# Patient Record
Sex: MALE | Race: WHITE | Employment: UNEMPLOYED | URBAN - METROPOLITAN AREA
[De-identification: names, ages, dates, MRNs, and addresses within clinical notes are randomized per-mention and may not be internally consistent; named-entity substitution may affect disease eponyms.]

---

## 2017-03-31 ENCOUNTER — LAB REQUISITION (OUTPATIENT)
Dept: LAB | Facility: HOSPITAL | Age: 52
End: 2017-03-31

## 2017-03-31 DIAGNOSIS — Z00.00 ENCOUNTER FOR GENERAL ADULT MEDICAL EXAMINATION WITHOUT ABNORMAL FINDINGS: ICD-10-CM

## 2017-03-31 PROCEDURE — 84480 ASSAY TRIIODOTHYRONINE (T3): CPT | Performed by: GENERAL PRACTICE

## 2017-03-31 PROCEDURE — 80053 COMPREHEN METABOLIC PANEL: CPT | Performed by: GENERAL PRACTICE

## 2017-03-31 PROCEDURE — 84436 ASSAY OF TOTAL THYROXINE: CPT | Performed by: GENERAL PRACTICE

## 2017-03-31 PROCEDURE — 85025 COMPLETE CBC W/AUTO DIFF WBC: CPT | Performed by: GENERAL PRACTICE

## 2017-03-31 PROCEDURE — 84443 ASSAY THYROID STIM HORMONE: CPT | Performed by: GENERAL PRACTICE

## 2017-03-31 PROCEDURE — 80061 LIPID PANEL: CPT | Performed by: GENERAL PRACTICE

## 2018-05-14 ENCOUNTER — LAB SERVICES (OUTPATIENT)
Dept: OTHER | Age: 53
End: 2018-05-14

## 2018-05-14 ENCOUNTER — CHARTING TRANS (OUTPATIENT)
Dept: OTHER | Age: 53
End: 2018-05-14

## 2018-05-14 LAB — RAPID STREP GROUP A: NORMAL

## 2018-05-17 ENCOUNTER — CHARTING TRANS (OUTPATIENT)
Dept: OTHER | Age: 53
End: 2018-05-17

## 2018-05-17 ASSESSMENT — PAIN SCALES - GENERAL: PAINLEVEL_OUTOF10: 0

## 2018-11-01 VITALS
OXYGEN SATURATION: 97 % | TEMPERATURE: 99.6 F | HEART RATE: 105 BPM | HEIGHT: 64 IN | BODY MASS INDEX: 30.39 KG/M2 | SYSTOLIC BLOOD PRESSURE: 148 MMHG | DIASTOLIC BLOOD PRESSURE: 62 MMHG | WEIGHT: 178 LBS | RESPIRATION RATE: 18 BRPM

## 2018-11-01 VITALS
OXYGEN SATURATION: 96 % | HEART RATE: 90 BPM | RESPIRATION RATE: 16 BRPM | BODY MASS INDEX: 32.27 KG/M2 | HEIGHT: 63 IN | TEMPERATURE: 98.5 F | WEIGHT: 182.1 LBS

## 2019-01-09 ENCOUNTER — OFFICE VISIT (OUTPATIENT)
Dept: INTERNAL MEDICINE CLINIC | Facility: CLINIC | Age: 54
End: 2019-01-09
Payer: MEDICAID

## 2019-01-09 VITALS
TEMPERATURE: 98 F | HEART RATE: 80 BPM | DIASTOLIC BLOOD PRESSURE: 82 MMHG | SYSTOLIC BLOOD PRESSURE: 142 MMHG | WEIGHT: 181 LBS | OXYGEN SATURATION: 98 % | RESPIRATION RATE: 16 BRPM | HEIGHT: 63 IN | BODY MASS INDEX: 32.07 KG/M2

## 2019-01-09 DIAGNOSIS — F17.200 TOBACCO DEPENDENCE: ICD-10-CM

## 2019-01-09 DIAGNOSIS — E78.2 MIXED HYPERLIPIDEMIA: ICD-10-CM

## 2019-01-09 DIAGNOSIS — I10 ESSENTIAL HYPERTENSION: ICD-10-CM

## 2019-01-09 DIAGNOSIS — Z00.00 WELLNESS EXAMINATION: Primary | ICD-10-CM

## 2019-01-09 DIAGNOSIS — Z00.00 LABORATORY EXAM ORDERED AS PART OF ROUTINE GENERAL MEDICAL EXAMINATION: ICD-10-CM

## 2019-01-09 DIAGNOSIS — Z12.39 SCREENING FOR BREAST CANCER: ICD-10-CM

## 2019-01-09 PROCEDURE — 99386 PREV VISIT NEW AGE 40-64: CPT | Performed by: INTERNAL MEDICINE

## 2019-01-09 RX ORDER — LOSARTAN POTASSIUM 25 MG/1
25 TABLET ORAL DAILY
Qty: 30 TABLET | Refills: 1 | Status: SHIPPED | OUTPATIENT
Start: 2019-01-09 | End: 2019-01-31

## 2019-01-09 NOTE — PATIENT INSTRUCTIONS
Blood work    Follow up with Dr. Fields [facility] gynecology     Call to schedule mammogram     Start losartan once daily in the morning. Monitor blood pressure at home and check 2 hours after taking medication.

## 2019-01-09 NOTE — PROGRESS NOTES
[facility] Internal Medicine Office Note  Chief Complaint:   Patient presents with:  New Patient  CPX      HPI:   This is a 52year old adult male (biologically female) coming in for establishing care and physical   HPI    He takes supplements fo Past Medical History, Past Surgical History, Family History and   Social History updated shows  Social History    Tobacco Use      Smoking status: Current Every Day Smoker      Smokeless tobacco: Never Used      Tobacco comment: Smokes 2-3 cigarettes daily Psychiatric: He has a normal mood and affect.        ASSESSMENT AND PLAN:   Jerica Fajardo is a 52year old adult with  Wellness examination  (primary encounter diagnosis)  Mixed hyperlipidemia  Tobacco dependence  Laboratory exam ordered as part of r 10/03/1965  Annual Physical due on 10/03/1965  Annual Depression Screen due on 10/03/1975  Pap Smear,3 Years due on 10/03/1994  FIT Colorectal Screening due on 10/03/2013  PSA due on 10/03/2013  Influenza Vaccine(1) due on 09/01/2018  Patient/Caregiver Edu

## 2019-01-15 ENCOUNTER — LAB ENCOUNTER (OUTPATIENT)
Dept: LAB | Age: 54
End: 2019-01-15
Attending: INTERNAL MEDICINE
Payer: MEDICAID

## 2019-01-15 DIAGNOSIS — Z00.00 LABORATORY EXAM ORDERED AS PART OF ROUTINE GENERAL MEDICAL EXAMINATION: ICD-10-CM

## 2019-01-15 LAB
ALBUMIN SERPL-MCNC: 4.1 G/DL (ref 3.1–4.5)
ALBUMIN/GLOB SERPL: 1.2 {RATIO} (ref 1–2)
ALP LIVER SERPL-CCNC: 62 U/L (ref 45–117)
ALT SERPL-CCNC: 25 U/L (ref 17–63)
ANION GAP SERPL CALC-SCNC: 5 MMOL/L (ref 0–18)
AST SERPL-CCNC: 18 U/L (ref 15–41)
BASOPHILS # BLD AUTO: 0.06 X10(3) UL (ref 0–0.1)
BASOPHILS NFR BLD AUTO: 0.6 %
BILIRUB SERPL-MCNC: 0.6 MG/DL (ref 0.1–2)
BUN BLD-MCNC: 16 MG/DL (ref 8–20)
BUN/CREAT SERPL: 15.2 (ref 10–20)
CALCIUM BLD-MCNC: 9.2 MG/DL (ref 8.3–10.3)
CHLORIDE SERPL-SCNC: 106 MMOL/L (ref 101–111)
CHOLEST SMN-MCNC: 209 MG/DL (ref ?–200)
CO2 SERPL-SCNC: 28 MMOL/L (ref 22–32)
CREAT BLD-MCNC: 1.05 MG/DL (ref 0.7–1.3)
EOSINOPHIL # BLD AUTO: 0.93 X10(3) UL (ref 0–0.3)
EOSINOPHIL NFR BLD AUTO: 8.7 %
ERYTHROCYTE [DISTWIDTH] IN BLOOD BY AUTOMATED COUNT: 12.9 % (ref 11.5–16)
GLOBULIN PLAS-MCNC: 3.4 G/DL (ref 2.8–4.4)
GLUCOSE BLD-MCNC: 98 MG/DL (ref 70–99)
HCT VFR BLD AUTO: 51 % (ref 37–53)
HDLC SERPL-MCNC: 32 MG/DL (ref 40–59)
HGB BLD-MCNC: 17.4 G/DL (ref 13–17)
IMMATURE GRANULOCYTE COUNT: 0.03 X10(3) UL (ref 0–1)
IMMATURE GRANULOCYTE RATIO %: 0.3 %
LDLC SERPL CALC-MCNC: 128 MG/DL (ref ?–100)
LYMPHOCYTES # BLD AUTO: 3.19 X10(3) UL (ref 0.9–4)
LYMPHOCYTES NFR BLD AUTO: 29.7 %
M PROTEIN MFR SERPL ELPH: 7.5 G/DL (ref 6.4–8.2)
MCH RBC QN AUTO: 30.6 PG (ref 27–33.2)
MCHC RBC AUTO-ENTMCNC: 34.1 G/DL (ref 31–37)
MCV RBC AUTO: 89.6 FL (ref 80–99)
MONOCYTES # BLD AUTO: 0.96 X10(3) UL (ref 0.1–1)
MONOCYTES NFR BLD AUTO: 8.9 %
NEUTROPHIL ABS PRELIM: 5.58 X10 (3) UL (ref 1.3–6.7)
NEUTROPHILS # BLD AUTO: 5.58 X10(3) UL (ref 1.3–6.7)
NEUTROPHILS NFR BLD AUTO: 51.8 %
NONHDLC SERPL-MCNC: 177 MG/DL (ref ?–130)
OSMOLALITY SERPL CALC.SUM OF ELEC: 289 MOSM/KG (ref 275–295)
PLATELET # BLD AUTO: 218 10(3)UL (ref 150–450)
POTASSIUM SERPL-SCNC: 4.2 MMOL/L (ref 3.6–5.1)
RBC # BLD AUTO: 5.69 X10(6)UL (ref 4.3–5.7)
RED CELL DISTRIBUTION WIDTH-SD: 42.2 FL (ref 35.1–46.3)
SODIUM SERPL-SCNC: 139 MMOL/L (ref 136–144)
TRIGL SERPL-MCNC: 243 MG/DL (ref 30–149)
TSI SER-ACNC: 3.76 MIU/ML (ref 0.35–5.5)
VLDLC SERPL CALC-MCNC: 49 MG/DL (ref 0–30)
WBC # BLD AUTO: 10.8 X10(3) UL (ref 4–13)

## 2019-01-15 PROCEDURE — 80061 LIPID PANEL: CPT

## 2019-01-15 PROCEDURE — 80053 COMPREHEN METABOLIC PANEL: CPT

## 2019-01-15 PROCEDURE — 36415 COLL VENOUS BLD VENIPUNCTURE: CPT

## 2019-01-15 PROCEDURE — 85025 COMPLETE CBC W/AUTO DIFF WBC: CPT

## 2019-01-15 PROCEDURE — 84443 ASSAY THYROID STIM HORMONE: CPT

## 2019-01-31 ENCOUNTER — OFFICE VISIT (OUTPATIENT)
Dept: INTERNAL MEDICINE CLINIC | Facility: CLINIC | Age: 54
End: 2019-01-31
Payer: MEDICAID

## 2019-01-31 VITALS
DIASTOLIC BLOOD PRESSURE: 68 MMHG | BODY MASS INDEX: 32.25 KG/M2 | RESPIRATION RATE: 14 BRPM | SYSTOLIC BLOOD PRESSURE: 142 MMHG | TEMPERATURE: 99 F | HEIGHT: 63 IN | OXYGEN SATURATION: 98 % | WEIGHT: 182 LBS | HEART RATE: 89 BPM

## 2019-01-31 DIAGNOSIS — Z51.81 ENCOUNTER FOR MONITORING TESTOSTERONE REPLACEMENT THERAPY: ICD-10-CM

## 2019-01-31 DIAGNOSIS — Z79.890 ENCOUNTER FOR MONITORING TESTOSTERONE REPLACEMENT THERAPY: ICD-10-CM

## 2019-01-31 DIAGNOSIS — E78.2 MIXED HYPERLIPIDEMIA: ICD-10-CM

## 2019-01-31 DIAGNOSIS — Z12.11 SCREEN FOR COLON CANCER: ICD-10-CM

## 2019-01-31 DIAGNOSIS — I10 ESSENTIAL HYPERTENSION: Primary | ICD-10-CM

## 2019-01-31 PROCEDURE — 99213 OFFICE O/P EST LOW 20 MIN: CPT | Performed by: INTERNAL MEDICINE

## 2019-01-31 RX ORDER — LOSARTAN POTASSIUM 25 MG/1
25 TABLET ORAL DAILY
Qty: 90 TABLET | Refills: 1 | Status: SHIPPED | OUTPATIENT
Start: 2019-01-31 | End: 2019-07-25

## 2019-01-31 NOTE — PATIENT INSTRUCTIONS
Call insurance to see who is in network for you for gastroenterology     Dr. Conor Michele - endocrinology is through [employer] [employer] - call insurance if need a closer location

## 2019-01-31 NOTE — PROGRESS NOTES
[facility] Internal Medicine Office Note  Chief Complaint:   Patient presents with:  Lab Results      HPI:   This is a 52year old male (biologically female) coming in for blood pressure follow up and blood work   HPI    HTN - 120/70 at home  10 Cecil Kurt. from the following:    Height as of this encounter: 63\". Weight as of this encounter: 182 lb. Vital signs reviewed. Appears stated age, well groomed. Physical Exam   Vitals reviewed. Constitutional: He appears well-developed.    HENT:   Head: Normo 10/03/2013  Influenza Vaccine(1) due on 09/01/2018  Patient/Caregiver Education: Patient/Caregiver Education: There are no barriers to learning. Medical education done. Outcome: Patient verbalizes understanding.  Patient is notified to call with any questio

## 2019-02-05 ENCOUNTER — TELEPHONE (OUTPATIENT)
Dept: INTERNAL MEDICINE CLINIC | Facility: CLINIC | Age: 54
End: 2019-02-05

## 2019-02-05 DIAGNOSIS — Z12.39 SCREENING FOR MALIGNANT NEOPLASM OF BREAST: Primary | ICD-10-CM

## 2019-02-05 NOTE — TELEPHONE ENCOUNTER
Received call from [facility] mammography department. They stated pt is coming in Monday for a diagnostic mammogram but the diagnosis states screening for breast cancer. Please advise if screening or if needing diagnostic.  Order for screening ma

## 2019-02-06 NOTE — TELEPHONE ENCOUNTER
My note from 1/9/19 shows order of \" ANGELIC SCREENING BILAT (CPT=77067); Future\". This is a screening mammogram for a female who has transitioned to male. I have re-entered the order that I originally placed.

## 2019-02-07 NOTE — TELEPHONE ENCOUNTER
Unsure why, but screening lee that was placed on 1/09/19 was cancelled and a diagnostic lee was ordered by someone else. Called [facility] mammography and notified them order was changed. Tyrel Kerr verbalized understanding.

## 2019-02-11 ENCOUNTER — HOSPITAL ENCOUNTER (OUTPATIENT)
Dept: MAMMOGRAPHY | Age: 54
Discharge: HOME | End: 2019-02-11
Attending: INTERNAL MEDICINE
Payer: MEDICAID

## 2019-02-11 DIAGNOSIS — Z12.39 SCREENING FOR MALIGNANT NEOPLASM OF BREAST: ICD-10-CM

## 2019-02-11 PROCEDURE — 77067 SCR MAMMO BI INCL CAD: CPT | Performed by: INTERNAL MEDICINE

## 2019-02-11 PROCEDURE — 77063 BREAST TOMOSYNTHESIS BI: CPT | Performed by: INTERNAL MEDICINE

## 2019-04-11 ENCOUNTER — APPOINTMENT (OUTPATIENT)
Dept: LAB | Facility: HOSPITAL | Age: 54
End: 2019-04-11
Attending: INTERNAL MEDICINE
Payer: MEDICAID

## 2019-04-11 ENCOUNTER — OFFICE VISIT (OUTPATIENT)
Dept: ENDOCRINOLOGY CLINIC | Facility: CLINIC | Age: 54
End: 2019-04-11
Payer: MEDICAID

## 2019-04-11 VITALS — WEIGHT: 180 LBS | BODY MASS INDEX: 32 KG/M2

## 2019-04-11 DIAGNOSIS — Z78.9 TRANSGENDER: ICD-10-CM

## 2019-04-11 DIAGNOSIS — E27.1 ADRENAL INSUFFICIENCY (ADDISON'S DISEASE) (HCC): Primary | ICD-10-CM

## 2019-04-11 DIAGNOSIS — E27.1 ADRENAL INSUFFICIENCY (ADDISON'S DISEASE) (HCC): ICD-10-CM

## 2019-04-11 PROCEDURE — 84443 ASSAY THYROID STIM HORMONE: CPT

## 2019-04-11 PROCEDURE — 84439 ASSAY OF FREE THYROXINE: CPT

## 2019-04-11 PROCEDURE — 82024 ASSAY OF ACTH: CPT

## 2019-04-11 PROCEDURE — 82533 TOTAL CORTISOL: CPT

## 2019-04-11 PROCEDURE — 84403 ASSAY OF TOTAL TESTOSTERONE: CPT

## 2019-04-11 PROCEDURE — 84146 ASSAY OF PROLACTIN: CPT

## 2019-04-11 PROCEDURE — 82670 ASSAY OF TOTAL ESTRADIOL: CPT

## 2019-04-11 PROCEDURE — 99204 OFFICE O/P NEW MOD 45 MIN: CPT | Performed by: INTERNAL MEDICINE

## 2019-04-11 PROCEDURE — 84402 ASSAY OF FREE TESTOSTERONE: CPT

## 2019-04-11 PROCEDURE — 83835 ASSAY OF METANEPHRINES: CPT

## 2019-04-11 PROCEDURE — 82088 ASSAY OF ALDOSTERONE: CPT

## 2019-04-11 PROCEDURE — 36415 COLL VENOUS BLD VENIPUNCTURE: CPT

## 2019-04-11 PROCEDURE — 84244 ASSAY OF RENIN: CPT

## 2019-04-11 NOTE — PROGRESS NOTES
Name: Lexine Apgar  Date: 2019    Referring Physician: No ref. provider found    Patient presents with:  Consult: Testosterone. 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       HISTORY OF PRESENT ILLNESS   Lexine Apgar is a 52year old male w normal  ENT: normal  Lungs: no shortness of breath, wheezing or GARCIA  Cardiovascular:  no chest pain or palpitations  Gastrointestinal:  no abdominal pain, bowel movement problems  Musculoskeletal: no muscle pain or arthralgia  /Gyne: no frequency or disc normal muscle strength and tone  PV: normal pulses of carotids, pedals  Skin:  normal moisture and skin texture  Hair & Nails:  normal scalp hair     Neuro:  sensory grossly intact and motor grossly intact  Psychiatric:  oriented to time, self, and place

## 2019-04-17 ENCOUNTER — TELEPHONE (OUTPATIENT)
Dept: ENDOCRINOLOGY CLINIC | Facility: CLINIC | Age: 54
End: 2019-04-17

## 2019-04-17 DIAGNOSIS — E27.1 ADDISON'S DISEASE (HCC): Primary | ICD-10-CM

## 2019-04-17 NOTE — TELEPHONE ENCOUNTER
Please call patient - good news, initial adrenal labs are normal however please proceed with cosyntropin test to further evaluate. His testosterone level is on low end of normal, lets change his dose to Testosterone 200mg IM every 2 weeks.   He might need

## 2019-04-23 NOTE — TELEPHONE ENCOUNTER
Spoke with [employer]. Needs ACTH order faxed to infusion center. Will send right away. Also, patient verbalizes understanding of lab results and increased dose of T injection.  Did discuss could potentially need to come in clinic for q2 week injections

## 2019-04-23 NOTE — TELEPHONE ENCOUNTER
Pt returned call and also would like to speak to RN about a problem with orders for ACTH testing/infusion center. Please call.

## 2019-04-24 ENCOUNTER — OFFICE VISIT (OUTPATIENT)
Dept: OBGYN CLINIC | Facility: CLINIC | Age: 54
End: 2019-04-24
Payer: MEDICAID

## 2019-04-24 VITALS
RESPIRATION RATE: 14 BRPM | BODY MASS INDEX: 31.41 KG/M2 | HEART RATE: 80 BPM | HEIGHT: 64 IN | TEMPERATURE: 98 F | SYSTOLIC BLOOD PRESSURE: 134 MMHG | WEIGHT: 184 LBS | DIASTOLIC BLOOD PRESSURE: 82 MMHG

## 2019-04-24 DIAGNOSIS — Z01.419 ENCOUNTER FOR ANNUAL ROUTINE GYNECOLOGICAL EXAMINATION: Primary | ICD-10-CM

## 2019-04-24 DIAGNOSIS — Z78.9 FEMALE-TO-MALE TRANSGENDER PERSON: ICD-10-CM

## 2019-04-24 DIAGNOSIS — Z78.9 FEMALE-TO-MALE TRANSGENDER PERSON: Primary | ICD-10-CM

## 2019-04-24 PROBLEM — E27.1 ADRENAL INSUFFICIENCY (ADDISON'S DISEASE) (HCC): Status: ACTIVE | Noted: 2019-04-24

## 2019-04-24 PROCEDURE — 99386 PREV VISIT NEW AGE 40-64: CPT | Performed by: OBSTETRICS & GYNECOLOGY

## 2019-04-24 RX ORDER — TESTOSTERONE CYPIONATE 200 MG/ML
200 INJECTION INTRAMUSCULAR
Qty: 10 ML | Refills: 1 | Status: SHIPPED | OUTPATIENT
Start: 2019-04-24 | End: 2019-05-24

## 2019-04-24 RX ORDER — SPIRONOLACTONE 100 MG/1
100 TABLET, FILM COATED ORAL DAILY
Qty: 90 TABLET | Refills: 1 | Status: SHIPPED | OUTPATIENT
Start: 2019-04-24 | End: 2019-05-24

## 2019-04-24 NOTE — PROGRESS NOTES
HPI:   Elif Gil is a 52year old No obstetric history on file. who presents for an annual gynecological exam.   Menses: No LMP for male patient. Menopause: postmenopausal  He is a trans-gender male.   He started transitioning in 1997 with testoste Age of Onset   • Stroke Father    • Stroke Paternal Grandfather       Social History:   Social History    Tobacco Use      Smoking status: Former Smoker        Types: Cigarettes        Start date: 1/24/2019      Smokeless tobacco: Never Used      Tobacco c patient. He is interested in getting the mastectomy and hysterectomy with bilateral salpingo-oophorectomy done.   He is aware that he will need medical clearance prior to any kind of procedure      ASSESSMENT AND PLAN:   Leigh Ramey is a 52year old

## 2019-04-24 NOTE — PROGRESS NOTES
Pt here for pe/pap.   LMP: over 25 years ago  Last pap: over 25 years ago  Last mammogram: 2/11/19 negative  Birth control: n/a

## 2019-04-24 NOTE — TELEPHONE ENCOUNTER
Spoke with patient and discussed below. He will let us know if testosterone not covered and will start Spironolactone. Discussed will need repeat BMP one month after starting.  Lab order placed

## 2019-04-24 NOTE — TELEPHONE ENCOUNTER
Noted, testosterone script printed and sent script for spironolactone to decrease estradiol level. Repeat BMP In one month to monitor potassium level. Thanks.

## 2019-04-26 ENCOUNTER — TELEPHONE (OUTPATIENT)
Dept: OBGYN CLINIC | Facility: CLINIC | Age: 54
End: 2019-04-26

## 2019-04-26 ENCOUNTER — TELEPHONE (OUTPATIENT)
Dept: ENDOCRINOLOGY CLINIC | Facility: CLINIC | Age: 54
End: 2019-04-26

## 2019-04-26 DIAGNOSIS — Z78.9 TRANSGENDER: Primary | ICD-10-CM

## 2019-04-26 NOTE — TELEPHONE ENCOUNTER
Current Outpatient Medications:  Testosterone cypionate 200 MG/ML Intramuscular Solution Inject 1 mL (200 mg total) into the muscle every 14 (fourteen) days. Disp: 10 mL Rfl: 1     PA request covermymeds go to key. covermymeds. Rezdy enter key [id number] enter p

## 2019-04-29 ENCOUNTER — ULTRASOUND ENCOUNTER (OUTPATIENT)
Dept: OBGYN CLINIC | Facility: CLINIC | Age: 54
End: 2019-04-29
Payer: MEDICAID

## 2019-04-30 ENCOUNTER — APPOINTMENT (OUTPATIENT)
Dept: LAB | Age: 54
End: 2019-04-30
Attending: INTERNAL MEDICINE
Payer: MEDICAID

## 2019-04-30 DIAGNOSIS — E27.1 ADDISON'S DISEASE (HCC): ICD-10-CM

## 2019-04-30 PROCEDURE — 36415 COLL VENOUS BLD VENIPUNCTURE: CPT

## 2019-04-30 PROCEDURE — 80048 BASIC METABOLIC PNL TOTAL CA: CPT

## 2019-05-01 NOTE — TELEPHONE ENCOUNTER
Spoke with patient. Have started PA but despite diagnosis insurance still requires two low pretreatment or two normal post treatment levels. Patient has had one drawn with us only.  Called him and he has not had levels checked in the past but is agreeable t

## 2019-05-02 ENCOUNTER — OFFICE VISIT (OUTPATIENT)
Dept: HEMATOLOGY/ONCOLOGY | Facility: HOSPITAL | Age: 54
End: 2019-05-02
Attending: INTERNAL MEDICINE
Payer: MEDICAID

## 2019-05-02 VITALS
OXYGEN SATURATION: 97 % | TEMPERATURE: 98 F | RESPIRATION RATE: 16 BRPM | DIASTOLIC BLOOD PRESSURE: 81 MMHG | HEART RATE: 88 BPM | SYSTOLIC BLOOD PRESSURE: 144 MMHG

## 2019-05-02 DIAGNOSIS — E27.1 ADRENAL INSUFFICIENCY (ADDISON'S DISEASE) (HCC): Primary | ICD-10-CM

## 2019-05-02 DIAGNOSIS — Z78.9 TRANSGENDER: ICD-10-CM

## 2019-05-02 PROCEDURE — 96374 THER/PROPH/DIAG INJ IV PUSH: CPT

## 2019-05-02 PROCEDURE — 84402 ASSAY OF FREE TESTOSTERONE: CPT

## 2019-05-02 PROCEDURE — 82533 TOTAL CORTISOL: CPT

## 2019-05-02 PROCEDURE — 84403 ASSAY OF TOTAL TESTOSTERONE: CPT

## 2019-05-02 PROCEDURE — 36415 COLL VENOUS BLD VENIPUNCTURE: CPT

## 2019-05-02 PROCEDURE — 82024 ASSAY OF ACTH: CPT

## 2019-05-02 PROCEDURE — 99211 OFF/OP EST MAY X REQ PHY/QHP: CPT

## 2019-05-02 RX ORDER — 0.9 % SODIUM CHLORIDE 0.9 %
VIAL (ML) INJECTION
Status: DISCONTINUED
Start: 2019-05-02 | End: 2019-05-02

## 2019-05-02 RX ORDER — COSYNTROPIN 0.25 MG/ML
0.25 INJECTION, POWDER, FOR SOLUTION INTRAMUSCULAR; INTRAVENOUS ONCE
Status: COMPLETED | OUTPATIENT
Start: 2019-05-02 | End: 2019-05-02

## 2019-05-02 RX ORDER — COSYNTROPIN 0.25 MG/ML
0.25 INJECTION, POWDER, FOR SOLUTION INTRAMUSCULAR; INTRAVENOUS ONCE
Status: CANCELLED | OUTPATIENT
Start: 2019-05-02

## 2019-05-02 RX ORDER — COSYNTROPIN 0.25 MG/ML
INJECTION, POWDER, FOR SOLUTION INTRAMUSCULAR; INTRAVENOUS
Status: COMPLETED
Start: 2019-05-02 | End: 2019-05-02

## 2019-05-02 RX ADMIN — COSYNTROPIN 0.25 MG: 0.25 INJECTION, POWDER, FOR SOLUTION INTRAMUSCULAR; INTRAVENOUS at 07:51:00

## 2019-05-02 NOTE — PROGRESS NOTES
Patient arrived independently by self for ACTH test. Testing procedure reviewed. 0hr labs drawn from PIV started. Cortosyn given. 30 min and 60 min labs drawn. Patient also requested for testosterone orders placed by MD to completed at this time.  Patient d

## 2019-05-06 ENCOUNTER — TELEPHONE (OUTPATIENT)
Dept: ENDOCRINOLOGY CLINIC | Facility: CLINIC | Age: 54
End: 2019-05-06

## 2019-05-06 ENCOUNTER — TELEPHONE (OUTPATIENT)
Dept: INTERNAL MEDICINE CLINIC | Facility: CLINIC | Age: 54
End: 2019-05-06

## 2019-05-06 NOTE — TELEPHONE ENCOUNTER
Spoke w/ Javy Olson and notified him of stable T level and that PA is processing.  See PA encounter dated 4/26/

## 2019-05-06 NOTE — TELEPHONE ENCOUNTER
Patient [facility] Garret Latif; please call to triage asap as he is having \"Sharp pain in the chest, radiating through back, since Thursday. \"

## 2019-05-06 NOTE — TELEPHONE ENCOUNTER
Pt stated on 5/2/19 he went in for an ACTH plasma test that morning. At that time he felt like the medication was given too fast and felt hot and uncomfortable. He notified the staff but was told it was ok.  Pt stated since then he has noticed he had sharp

## 2019-05-07 ENCOUNTER — OFFICE VISIT (OUTPATIENT)
Dept: INTERNAL MEDICINE CLINIC | Facility: CLINIC | Age: 54
End: 2019-05-07
Payer: MEDICAID

## 2019-05-07 ENCOUNTER — HOSPITAL ENCOUNTER (OUTPATIENT)
Dept: GENERAL RADIOLOGY | Age: 54
Discharge: HOME | End: 2019-05-07
Attending: INTERNAL MEDICINE
Payer: MEDICAID

## 2019-05-07 VITALS
HEIGHT: 64 IN | SYSTOLIC BLOOD PRESSURE: 127 MMHG | DIASTOLIC BLOOD PRESSURE: 76 MMHG | BODY MASS INDEX: 30.35 KG/M2 | TEMPERATURE: 98 F | WEIGHT: 177.75 LBS | RESPIRATION RATE: 16 BRPM | HEART RATE: 78 BPM

## 2019-05-07 DIAGNOSIS — I10 ESSENTIAL HYPERTENSION: ICD-10-CM

## 2019-05-07 DIAGNOSIS — R07.9 CHEST PAIN, UNSPECIFIED TYPE: Primary | ICD-10-CM

## 2019-05-07 DIAGNOSIS — R07.9 CHEST PAIN, UNSPECIFIED TYPE: ICD-10-CM

## 2019-05-07 PROCEDURE — 71046 X-RAY EXAM CHEST 2 VIEWS: CPT | Performed by: INTERNAL MEDICINE

## 2019-05-07 PROCEDURE — 99213 OFFICE O/P EST LOW 20 MIN: CPT | Performed by: INTERNAL MEDICINE

## 2019-05-07 PROCEDURE — 93000 ELECTROCARDIOGRAM COMPLETE: CPT | Performed by: INTERNAL MEDICINE

## 2019-05-07 RX ORDER — NAPROXEN 500 MG/1
500 TABLET ORAL 2 TIMES DAILY WITH MEALS
Qty: 20 TABLET | Refills: 0 | Status: SHIPPED | OUTPATIENT
Start: 2019-05-07 | End: 2019-06-18 | Stop reason: ALTCHOICE

## 2019-05-07 NOTE — PROGRESS NOTES
[facility] Internal Medicine Office Note  Chief Complaint:   Patient presents with:  Chest Pain: shart pain x 5 days       HPI:   This is a 52year old male coming in for chest pain   HPI  Present at left rib cage for 5 days  Pain worse when he l by mouth daily. Disp: 90 tablet Rfl: 1   Testosterone cypionate 200 MG/ML Intramuscular Solution Inject 1 mL (200 mg total) into the muscle every 14 (fourteen) days.  Disp: 10 mL Rfl: 1         REVIEW OF SYSTEMS:   Review of Systems   Constitutional: Vijaya Lakhani COMPLETE-  shows NSR with HR of 84. No previous to compare      -     XR CHEST PA + LAT CHEST (CPT=71046); Future  -     naproxen 500 MG Oral Tab; Take 1 tablet (500 mg total) by mouth 2 (two) times daily with meals.     Essential hypertension - patient pre

## 2019-05-08 NOTE — TELEPHONE ENCOUNTER
PA approved, details of approval have been faxed to office. [employer] message sent to patient.

## 2019-05-09 RX ORDER — SYRINGE W-NEEDLE,DISPOSAB,3 ML 25GX5/8"
SYRINGE, EMPTY DISPOSABLE MISCELLANEOUS
Qty: 50 EACH | Refills: 0 | Status: SHIPPED | OUTPATIENT
Start: 2019-05-09 | End: 2021-03-03

## 2019-05-09 NOTE — TELEPHONE ENCOUNTER
PA approved #[id number] through 5/7/2020. Pt made aware and states Testosterone medication already filled. Pt needs syringes. Ordered per protocol.     Forwarded to provider as [facility]

## 2019-05-10 ENCOUNTER — TELEPHONE (OUTPATIENT)
Dept: ENDOCRINOLOGY CLINIC | Facility: CLINIC | Age: 54
End: 2019-05-10

## 2019-05-10 NOTE — TELEPHONE ENCOUNTER
[facility] calling for mutual pt regarding rx:syringes has 23 gauge that can be substituted, script 22 gauge not available, pls call at:[phone],thanks.     Current Outpatient Medications:   •  Syringe 22G X 1\" 3 ML Does not apply Misc, Use to inject

## 2019-05-24 RX ORDER — SPIRONOLACTONE 100 MG/1
100 TABLET, FILM COATED ORAL DAILY
Qty: 90 TABLET | Refills: 1 | Status: SHIPPED | OUTPATIENT
Start: 2019-05-24 | End: 2020-04-17

## 2019-05-24 RX ORDER — TESTOSTERONE CYPIONATE 200 MG/ML
200 INJECTION INTRAMUSCULAR
Qty: 10 ML | Refills: 1 | Status: SHIPPED | OUTPATIENT
Start: 2019-05-24 | End: 2019-11-25

## 2019-06-11 ENCOUNTER — TELEPHONE (OUTPATIENT)
Dept: INTERNAL MEDICINE CLINIC | Facility: CLINIC | Age: 54
End: 2019-06-11

## 2019-06-11 NOTE — TELEPHONE ENCOUNTER
Patient scheduled an appointment for next week with Dr Nick Juarez with \"abdominal, back and chest pains. \" Please call to triage with patient in case he needs to be seen sooner

## 2019-06-11 NOTE — TELEPHONE ENCOUNTER
Pt stating he has been having this issue for years now. Offered to schedule pt this week. Pt stated he is out of town this week and cannot come in this week. Advised pt if he is experiencing worsening symptoms to go to ER/IC near him.  Pt verbalized underst

## 2019-06-18 ENCOUNTER — OFFICE VISIT (OUTPATIENT)
Dept: INTERNAL MEDICINE CLINIC | Facility: CLINIC | Age: 54
End: 2019-06-18
Payer: MEDICAID

## 2019-06-18 VITALS
HEIGHT: 64 IN | BODY MASS INDEX: 29.96 KG/M2 | WEIGHT: 175.5 LBS | DIASTOLIC BLOOD PRESSURE: 72 MMHG | OXYGEN SATURATION: 97 % | HEART RATE: 72 BPM | RESPIRATION RATE: 16 BRPM | TEMPERATURE: 99 F | SYSTOLIC BLOOD PRESSURE: 124 MMHG

## 2019-06-18 DIAGNOSIS — I10 ESSENTIAL HYPERTENSION: ICD-10-CM

## 2019-06-18 DIAGNOSIS — I70.0 ATHEROSCLEROSIS OF AORTA (HCC): Primary | ICD-10-CM

## 2019-06-18 DIAGNOSIS — R14.0 ABDOMINAL DISTENTION: ICD-10-CM

## 2019-06-18 DIAGNOSIS — R10.9 ABDOMINAL DISCOMFORT: ICD-10-CM

## 2019-06-18 DIAGNOSIS — Z12.11 SCREENING FOR COLON CANCER: ICD-10-CM

## 2019-06-18 PROCEDURE — 99214 OFFICE O/P EST MOD 30 MIN: CPT | Performed by: INTERNAL MEDICINE

## 2019-06-18 RX ORDER — OMEPRAZOLE 40 MG/1
40 CAPSULE, DELAYED RELEASE ORAL DAILY
Qty: 90 CAPSULE | Refills: 1 | Status: SHIPPED | OUTPATIENT
Start: 2019-06-18 | End: 2019-10-21

## 2019-06-18 RX ORDER — ROSUVASTATIN CALCIUM 10 MG/1
10 TABLET, COATED ORAL NIGHTLY
Qty: 90 TABLET | Refills: 1 | Status: SHIPPED | OUTPATIENT
Start: 2019-06-18 | End: 2019-12-17

## 2019-06-18 NOTE — PROGRESS NOTES
[facility] Internal Medicine Office Note  Chief Complaint:   Patient presents with:  Pain: athesclorosis of the abdomen, x1 year, dull pain, tingling and numbness in fingers       HPI:   This is a 52year old male coming in for follow up for CT f mL (200 mg total) into the muscle every 14 (fourteen) days. Disp: 10 mL Rfl: 1   spironolactone 100 MG Oral Tab Take 1 tablet (100 mg total) by mouth daily.  Disp: 90 tablet Rfl: 1   Syringe 22G X 1\" 3 ML Does not apply Misc Use to inject Testosterone ever cancer      The plan is as follows  Whitneyrodolfo Sy was seen today for pain.     Diagnoses and all orders for this visit:    Atherosclerosis of aorta ([facility] [address] -discussed starting asa 81mg and statin for prevention of cardiovascular events including heart attack an Patient is to call with any side effects or complications from the treatments as a result of today.      Johann Collazo MD

## 2019-06-28 ENCOUNTER — APPOINTMENT (OUTPATIENT)
Dept: LAB | Age: 54
End: 2019-06-28
Attending: INTERNAL MEDICINE
Payer: MEDICAID

## 2019-06-28 DIAGNOSIS — Z12.11 SCREENING FOR COLON CANCER: ICD-10-CM

## 2019-06-28 PROCEDURE — 82274 ASSAY TEST FOR BLOOD FECAL: CPT

## 2019-07-26 RX ORDER — LOSARTAN POTASSIUM 25 MG/1
TABLET ORAL
Qty: 90 TABLET | Refills: 1 | Status: SHIPPED | OUTPATIENT
Start: 2019-07-26 | End: 2020-01-20

## 2019-07-26 NOTE — TELEPHONE ENCOUNTER
Losartan Potassium 25 mg oral tab    Passed Protocol    Last OV relevant to medication: 06/18/2019    Last refill date: 01/31/2019     #/refills: #90 w/ 1 refill     When pt was asked to return for OV: 5 weeks    Upcoming appt/reason: No Future appointment

## 2019-10-22 NOTE — TELEPHONE ENCOUNTER
Omeprazole 40 MG Oral Capsule       Last OV relevant to medication: 6-     Last refill date:  6- #90 caps with 1 refill      When pt was asked to return for OV: 5 weeks     Upcoming appt/reason:  None     Labs: 5-2-2019

## 2019-10-23 RX ORDER — OMEPRAZOLE 40 MG/1
40 CAPSULE, DELAYED RELEASE ORAL DAILY
Qty: 90 CAPSULE | Refills: 1 | Status: SHIPPED | OUTPATIENT
Start: 2019-10-23 | End: 2019-11-25

## 2019-11-26 RX ORDER — OMEPRAZOLE 40 MG/1
40 CAPSULE, DELAYED RELEASE ORAL DAILY
Qty: 90 CAPSULE | Refills: 1 | Status: SHIPPED | OUTPATIENT
Start: 2019-11-26 | End: 2020-06-18

## 2019-11-26 NOTE — TELEPHONE ENCOUNTER
LOV 4/11/19 with RTC 3 months. Sent [facility] message asking patient to schedule. Also asked if he is currently out of medication.

## 2019-11-26 NOTE — TELEPHONE ENCOUNTER
Omeprazole 40 mg Oral Capsule Delayed Release    Last OV relevant to medication: 6/18/2019  Last refill date: 10/23/2019     #/refills: #90 w/ 1 refill  When pt was asked to return for OV: 5 weeks   Upcoming appt/reason: no future appointments

## 2019-11-29 NOTE — TELEPHONE ENCOUNTER
Jovan Tineo called. He is fine waiting until next week for refill.  Booked FU 3/2/20 and also added to wait list.

## 2019-12-02 RX ORDER — TESTOSTERONE CYPIONATE 200 MG/ML
200 INJECTION INTRAMUSCULAR
Qty: 2 ML | Refills: 0 | Status: SHIPPED | OUTPATIENT
Start: 2019-12-02 | End: 2019-12-30

## 2019-12-17 RX ORDER — OMEPRAZOLE 40 MG/1
40 CAPSULE, DELAYED RELEASE ORAL DAILY
Qty: 90 CAPSULE | Refills: 1 | Status: CANCELLED | OUTPATIENT
Start: 2019-12-17 | End: 2020-06-14

## 2019-12-18 NOTE — TELEPHONE ENCOUNTER
Omeprazole 40 mg Oral Capsule Delayed Release    Last OV relevant to medication: 6/18/2019  Last refill date: 11/26/2019     #/refills: #90 w.1 refills   When pt was asked to return for OV: 5 weeks   Upcoming appt/reason: No future appointments         Ros

## 2019-12-18 NOTE — TELEPHONE ENCOUNTER
ROSUVASTATIN CALCIUM 10 MG Oral Tab    Passed Protocol    Last OV relevant to medication: 6/18/2019  Last refill date: 6/18/2019     #/refills: #90 w/ 1 refill  When pt was asked to return for OV: 5 weeks   Upcoming appt/reason: no future appointments   La

## 2019-12-19 RX ORDER — ROSUVASTATIN CALCIUM 10 MG/1
10 TABLET, COATED ORAL NIGHTLY
Qty: 90 TABLET | Refills: 1 | OUTPATIENT
Start: 2019-12-19

## 2019-12-19 RX ORDER — ROSUVASTATIN CALCIUM 10 MG/1
TABLET, COATED ORAL
Qty: 90 TABLET | Refills: 3 | Status: SHIPPED | OUTPATIENT
Start: 2019-12-19 | End: 2020-08-13

## 2019-12-19 NOTE — TELEPHONE ENCOUNTER
Rosuvastatin Calcium (Crestor) 10 mg Oral Tab    Passed Protocol    Last OV relevant to medication: 6/18/2019  Last refill date: 6/18/2019     #/refills: #90 w/ 1 refills   When pt was asked to return for OV: 5 weeks   Upcoming appt/reason: no future appoi

## 2019-12-19 NOTE — TELEPHONE ENCOUNTER
[address] and confirmed the RX Omeprazole was ready for  on 11/26/19. Pt stated he went there and they claimed they were still waiting on Physician approval.    The only RX needed now is the Rosuvastatin.

## 2019-12-30 RX ORDER — TESTOSTERONE CYPIONATE 200 MG/ML
200 INJECTION INTRAMUSCULAR
Qty: 2 ML | Refills: 0 | Status: SHIPPED | OUTPATIENT
Start: 2019-12-30 | End: 2020-02-05

## 2020-01-16 RX ORDER — LOSARTAN POTASSIUM 25 MG/1
TABLET ORAL
Qty: 90 TABLET | Refills: 1 | OUTPATIENT
Start: 2020-01-16

## 2020-01-16 NOTE — TELEPHONE ENCOUNTER
Losartan Potassium 25 mg Oral Tab     Failed Protocol    Hypertension Medications Protocol Failed1/15 9:51 PM   Appointment in past 6 or next 3 months     Last OV relevant to medication: 6/18/2019  Last refill date: 7/26/2019     #/refills: #90 w/ 1 refill

## 2020-01-17 ENCOUNTER — TELEPHONE (OUTPATIENT)
Dept: INTERNAL MEDICINE CLINIC | Facility: CLINIC | Age: 55
End: 2020-01-17

## 2020-01-20 RX ORDER — LOSARTAN POTASSIUM 25 MG/1
TABLET ORAL
Qty: 90 TABLET | Refills: 1 | Status: SHIPPED | OUTPATIENT
Start: 2020-01-20 | End: 2020-07-17

## 2020-01-23 NOTE — TELEPHONE ENCOUNTER
Medication denied on cover my medication.       lmtcb # 1- When patient calls back please inform per Dr. Nick Juarez states, he can purchase over the counter 20 mg and if not effective can stop the medication,

## 2020-02-06 RX ORDER — TESTOSTERONE CYPIONATE 200 MG/ML
200 INJECTION INTRAMUSCULAR
Qty: 2 ML | Refills: 0 | Status: SHIPPED | OUTPATIENT
Start: 2020-02-06 | End: 2020-03-04

## 2020-02-11 ENCOUNTER — TELEPHONE (OUTPATIENT)
Dept: INTERNAL MEDICINE CLINIC | Facility: CLINIC | Age: 55
End: 2020-02-11

## 2020-02-11 NOTE — TELEPHONE ENCOUNTER
Pt called to report low diastolic b/p's in the 22'A for about 10 days. B/p's  running around 140/48. Pt also c/o dizziness on and off. Pt states he stopped his Losartan 3 days ago. Pt offered appointment today and declined d/t work.   Declines to [facility]

## 2020-02-11 NOTE — TELEPHONE ENCOUNTER
Please contact patient to triage low blood pressure symptoms. He sent schedule request email with the following symptoms: \"Issues with diastolic blood pressure going bellow 48 and feeling dizzy and extremely tired.  \"

## 2020-02-20 ENCOUNTER — LAB ENCOUNTER (OUTPATIENT)
Dept: LAB | Age: 55
End: 2020-02-20
Attending: INTERNAL MEDICINE
Payer: MEDICAID

## 2020-02-20 ENCOUNTER — OFFICE VISIT (OUTPATIENT)
Dept: INTERNAL MEDICINE CLINIC | Facility: CLINIC | Age: 55
End: 2020-02-20
Payer: MEDICAID

## 2020-02-20 VITALS
RESPIRATION RATE: 12 BRPM | BODY MASS INDEX: 30.39 KG/M2 | HEART RATE: 87 BPM | WEIGHT: 178 LBS | TEMPERATURE: 98 F | HEIGHT: 64 IN | OXYGEN SATURATION: 98 % | DIASTOLIC BLOOD PRESSURE: 96 MMHG | SYSTOLIC BLOOD PRESSURE: 140 MMHG

## 2020-02-20 DIAGNOSIS — G44.84 EXERTIONAL HEADACHE: ICD-10-CM

## 2020-02-20 DIAGNOSIS — R53.83 FATIGUE, UNSPECIFIED TYPE: ICD-10-CM

## 2020-02-20 DIAGNOSIS — R41.3 MEMORY LOSS: ICD-10-CM

## 2020-02-20 DIAGNOSIS — I10 ESSENTIAL HYPERTENSION: Primary | ICD-10-CM

## 2020-02-20 LAB
ALBUMIN SERPL-MCNC: 4.1 G/DL (ref 3.4–5)
ALBUMIN/GLOB SERPL: 1.1 {RATIO} (ref 1–2)
ALP LIVER SERPL-CCNC: 71 U/L (ref 45–117)
ALT SERPL-CCNC: 35 U/L (ref 16–61)
ANION GAP SERPL CALC-SCNC: 3 MMOL/L (ref 0–18)
AST SERPL-CCNC: 21 U/L (ref 15–37)
BASOPHILS # BLD AUTO: 0.06 X10(3) UL (ref 0–0.2)
BASOPHILS NFR BLD AUTO: 0.5 %
BILIRUB SERPL-MCNC: 0.4 MG/DL (ref 0.1–2)
BUN BLD-MCNC: 13 MG/DL (ref 7–18)
BUN/CREAT SERPL: 13.5 (ref 10–20)
CALCIUM BLD-MCNC: 9.7 MG/DL (ref 8.5–10.1)
CHLORIDE SERPL-SCNC: 104 MMOL/L (ref 98–112)
CO2 SERPL-SCNC: 28 MMOL/L (ref 21–32)
CREAT BLD-MCNC: 0.96 MG/DL (ref 0.7–1.3)
DEPRECATED RDW RBC AUTO: 38.5 FL (ref 35.1–46.3)
EOSINOPHIL # BLD AUTO: 0.74 X10(3) UL (ref 0–0.7)
EOSINOPHIL NFR BLD AUTO: 6 %
ERYTHROCYTE [DISTWIDTH] IN BLOOD BY AUTOMATED COUNT: 11.8 % (ref 11–15)
GLOBULIN PLAS-MCNC: 3.9 G/DL (ref 2.8–4.4)
GLUCOSE BLD-MCNC: 62 MG/DL (ref 70–99)
HCT VFR BLD AUTO: 52.8 % (ref 39–53)
HGB BLD-MCNC: 18.1 G/DL (ref 13–17.5)
IMM GRANULOCYTES # BLD AUTO: 0.06 X10(3) UL (ref 0–1)
IMM GRANULOCYTES NFR BLD: 0.5 %
LYMPHOCYTES # BLD AUTO: 3 X10(3) UL (ref 1–4)
LYMPHOCYTES NFR BLD AUTO: 24.4 %
M PROTEIN MFR SERPL ELPH: 8 G/DL (ref 6.4–8.2)
MCH RBC QN AUTO: 30.6 PG (ref 26–34)
MCHC RBC AUTO-ENTMCNC: 34.3 G/DL (ref 31–37)
MCV RBC AUTO: 89.3 FL (ref 80–100)
MONOCYTES # BLD AUTO: 1.36 X10(3) UL (ref 0.1–1)
MONOCYTES NFR BLD AUTO: 11.1 %
NEUTROPHILS # BLD AUTO: 7.07 X10 (3) UL (ref 1.5–7.7)
NEUTROPHILS # BLD AUTO: 7.07 X10(3) UL (ref 1.5–7.7)
NEUTROPHILS NFR BLD AUTO: 57.5 %
OSMOLALITY SERPL CALC.SUM OF ELEC: 278 MOSM/KG (ref 275–295)
PATIENT FASTING Y/N/NP: NO
PLATELET # BLD AUTO: 250 10(3)UL (ref 150–450)
POTASSIUM SERPL-SCNC: 4.2 MMOL/L (ref 3.5–5.1)
RBC # BLD AUTO: 5.91 X10(6)UL (ref 4.3–5.7)
SODIUM SERPL-SCNC: 135 MMOL/L (ref 136–145)
VIT B12 SERPL-MCNC: 305 PG/ML (ref 193–986)
WBC # BLD AUTO: 12.3 X10(3) UL (ref 4–11)

## 2020-02-20 PROCEDURE — 80053 COMPREHEN METABOLIC PANEL: CPT

## 2020-02-20 PROCEDURE — 82607 VITAMIN B-12: CPT

## 2020-02-20 PROCEDURE — 36415 COLL VENOUS BLD VENIPUNCTURE: CPT

## 2020-02-20 PROCEDURE — 99214 OFFICE O/P EST MOD 30 MIN: CPT | Performed by: INTERNAL MEDICINE

## 2020-02-20 PROCEDURE — 85025 COMPLETE CBC W/AUTO DIFF WBC: CPT

## 2020-02-20 NOTE — PROGRESS NOTES
[facility] Internal Medicine Office Note  Chief Complaint:   Patient presents with:  Blood Pressure      HPI:   This is a 62year old male coming in for blood pressure check   HPI  He had concern about low diastolic pressure of 40  He has had lico • LOSARTAN POTASSIUM 25 MG Oral Tab TAKE 1 TABLET BY MOUTH ONE TIME A DAY  90 tablet 1   • ROSUVASTATIN CALCIUM 10 MG Oral Tab TAKE 1 TABLET BY MOUTH IN THE EVENING  90 tablet 3   • Omeprazole 40 MG Oral Capsule Delayed Release Take 1 capsule (40 mg tota visit:    Essential hypertension -take 1/2 tab of losartan daily to make 12.5mg daily. Memory loss -check B12   -     VITAMIN B12; Future    Fatigue, unspecified type -check labs  -     CBC WITH DIFFERENTIAL WITH PLATELET;  Future  -     COMP METABOLIC

## 2020-02-21 DIAGNOSIS — D72.829 LEUKOCYTOSIS, UNSPECIFIED TYPE: Primary | ICD-10-CM

## 2020-03-02 ENCOUNTER — OFFICE VISIT (OUTPATIENT)
Dept: ENDOCRINOLOGY CLINIC | Facility: CLINIC | Age: 55
End: 2020-03-02
Payer: MEDICAID

## 2020-03-02 VITALS
HEART RATE: 89 BPM | BODY MASS INDEX: 30 KG/M2 | DIASTOLIC BLOOD PRESSURE: 79 MMHG | SYSTOLIC BLOOD PRESSURE: 123 MMHG | WEIGHT: 176 LBS

## 2020-03-02 DIAGNOSIS — Z78.9 TRANSGENDER: Primary | ICD-10-CM

## 2020-03-02 PROCEDURE — 99213 OFFICE O/P EST LOW 20 MIN: CPT | Performed by: INTERNAL MEDICINE

## 2020-03-02 RX ORDER — TESTOSTERONE CYPIONATE 200 MG/ML
200 INJECTION, SOLUTION INTRAMUSCULAR
Qty: 2 ML | Refills: 0 | Status: CANCELLED | OUTPATIENT
Start: 2020-03-02

## 2020-03-02 NOTE — PROGRESS NOTES
Name: Leidy Blas  Date: 3/2/2020    Referring Physician: No ref. provider found    Patient presents with: Follow - Up: Addisons disease.        HISTORY OF PRESENT ILLNESS   Leidy Blas is a 62year old male who presents for Patient presents problems  Musculoskeletal: no muscle pain or arthralgia  /Gyne: no frequency or discomfort while urinating  Psychiatric:  no acute distress, anxiety  or depression  Skin: normal moisturized skin    Medications:     Current Outpatient Medications:   •  Te • CHOLECYSTECTOMY  2008    [place], [place]       PHYSICAL EXAMINATION:  /79   Pulse 89   Wt 176 lb (79.8 kg)   BMI 30.21 kg/m²     General Appearance:  Alert, in no acute distress, well developed  Eyes: normal conjunctivae, sclera.   Ears/Nose/Mouth/Thr

## 2020-03-04 ENCOUNTER — HOSPITAL ENCOUNTER (OUTPATIENT)
Dept: GENERAL RADIOLOGY | Age: 55
Discharge: HOME | End: 2020-03-04
Attending: PHYSICIAN ASSISTANT
Payer: MEDICAID

## 2020-03-04 ENCOUNTER — OFFICE VISIT (OUTPATIENT)
Dept: INTERNAL MEDICINE CLINIC | Facility: CLINIC | Age: 55
End: 2020-03-04
Payer: MEDICAID

## 2020-03-04 VITALS
HEART RATE: 88 BPM | DIASTOLIC BLOOD PRESSURE: 60 MMHG | RESPIRATION RATE: 16 BRPM | TEMPERATURE: 98 F | OXYGEN SATURATION: 95 % | SYSTOLIC BLOOD PRESSURE: 88 MMHG | BODY MASS INDEX: 30 KG/M2 | WEIGHT: 177.25 LBS

## 2020-03-04 DIAGNOSIS — R11.0 NAUSEA: ICD-10-CM

## 2020-03-04 DIAGNOSIS — R05.9 COUGH: ICD-10-CM

## 2020-03-04 DIAGNOSIS — R05.9 COUGH: Primary | ICD-10-CM

## 2020-03-04 DIAGNOSIS — I10 ESSENTIAL HYPERTENSION: ICD-10-CM

## 2020-03-04 DIAGNOSIS — R19.7 DIARRHEA, UNSPECIFIED TYPE: ICD-10-CM

## 2020-03-04 PROCEDURE — 99214 OFFICE O/P EST MOD 30 MIN: CPT | Performed by: PHYSICIAN ASSISTANT

## 2020-03-04 PROCEDURE — 71046 X-RAY EXAM CHEST 2 VIEWS: CPT | Performed by: PHYSICIAN ASSISTANT

## 2020-03-04 RX ORDER — AZITHROMYCIN 250 MG/1
TABLET, FILM COATED ORAL
Qty: 6 TABLET | Refills: 0 | Status: SHIPPED | OUTPATIENT
Start: 2020-03-04 | End: 2020-07-30 | Stop reason: ALTCHOICE

## 2020-03-04 RX ORDER — TESTOSTERONE CYPIONATE 200 MG/ML
200 INJECTION, SOLUTION INTRAMUSCULAR
Qty: 2 ML | Refills: 0 | Status: CANCELLED | OUTPATIENT
Start: 2020-03-04

## 2020-03-04 RX ORDER — BENZONATATE 200 MG/1
200 CAPSULE ORAL 3 TIMES DAILY PRN
Qty: 20 CAPSULE | Refills: 0 | Status: SHIPPED | OUTPATIENT
Start: 2020-03-04 | End: 2020-03-14

## 2020-03-04 RX ORDER — TESTOSTERONE CYPIONATE 200 MG/ML
INJECTION INTRAMUSCULAR
Qty: 10 ML | Refills: 0 | Status: SHIPPED | OUTPATIENT
Start: 2020-03-04 | End: 2020-06-04

## 2020-03-04 NOTE — PATIENT INSTRUCTIONS
Chest x-ray today.     Cough:  - antibiotic pending chest x-ray result  - benzonatate capsule - 1 cap three times a day as needed  - increase fluids, cough drops as needed  - bland diet due to nausea, loose stool  - tylenol 1,000 mg every 8 hours as needed

## 2020-03-04 NOTE — PROGRESS NOTES
HPI:  Micheal Acosta is a 62year old male who presents for URI symptoms x 10 days.   Initially had fever up to 101 (has remained upper 99), productive cough with yellow sputum, nasal congestion and drainage (improving), headache, chest tightness, SOB, non-distended, BS present, no tenderness, no hepatosplenomegaly    ASSESSMENT AND PLAN:  # Cough: CXR to eval for PNA (specifically RML). Encouraged increased fluids, rest, OTC acetaminophen and NSAIDs PRN, tessalon prn cough.   # Nausea, diarrhea: increas

## 2020-03-18 ENCOUNTER — LAB ENCOUNTER (OUTPATIENT)
Dept: LAB | Age: 55
End: 2020-03-18
Attending: INTERNAL MEDICINE
Payer: MEDICAID

## 2020-03-18 DIAGNOSIS — Z78.9 TRANSGENDER: ICD-10-CM

## 2020-03-18 LAB
ALBUMIN SERPL-MCNC: 3.8 G/DL (ref 3.4–5)
ALBUMIN/GLOB SERPL: 1.1 {RATIO} (ref 1–2)
ALP LIVER SERPL-CCNC: 59 U/L (ref 45–117)
ALT SERPL-CCNC: 31 U/L (ref 16–61)
ANION GAP SERPL CALC-SCNC: 3 MMOL/L (ref 0–18)
AST SERPL-CCNC: 16 U/L (ref 15–37)
BILIRUB SERPL-MCNC: 0.5 MG/DL (ref 0.1–2)
BUN BLD-MCNC: 15 MG/DL (ref 7–18)
BUN/CREAT SERPL: 13.4 (ref 10–20)
CALCIUM BLD-MCNC: 9.4 MG/DL (ref 8.5–10.1)
CHLORIDE SERPL-SCNC: 105 MMOL/L (ref 98–112)
CO2 SERPL-SCNC: 28 MMOL/L (ref 21–32)
CREAT BLD-MCNC: 1.12 MG/DL (ref 0.7–1.3)
DEPRECATED RDW RBC AUTO: 40.4 FL (ref 35.1–46.3)
ERYTHROCYTE [DISTWIDTH] IN BLOOD BY AUTOMATED COUNT: 12.4 % (ref 11–15)
GLOBULIN PLAS-MCNC: 3.6 G/DL (ref 2.8–4.4)
GLUCOSE BLD-MCNC: 131 MG/DL (ref 70–99)
HCT VFR BLD AUTO: 50.8 % (ref 39–53)
HGB BLD-MCNC: 17.3 G/DL (ref 13–17.5)
M PROTEIN MFR SERPL ELPH: 7.4 G/DL (ref 6.4–8.2)
MCH RBC QN AUTO: 30.4 PG (ref 26–34)
MCHC RBC AUTO-ENTMCNC: 34.1 G/DL (ref 31–37)
MCV RBC AUTO: 89.3 FL (ref 80–100)
OSMOLALITY SERPL CALC.SUM OF ELEC: 285 MOSM/KG (ref 275–295)
PATIENT FASTING Y/N/NP: YES
PLATELET # BLD AUTO: 238 10(3)UL (ref 150–450)
POTASSIUM SERPL-SCNC: 4.6 MMOL/L (ref 3.5–5.1)
RBC # BLD AUTO: 5.69 X10(6)UL (ref 4.3–5.7)
SODIUM SERPL-SCNC: 136 MMOL/L (ref 136–145)
TSI SER-ACNC: 1.98 MIU/ML (ref 0.36–3.74)
WBC # BLD AUTO: 10.6 X10(3) UL (ref 4–11)

## 2020-03-18 PROCEDURE — 80053 COMPREHEN METABOLIC PANEL: CPT

## 2020-03-18 PROCEDURE — 84402 ASSAY OF FREE TESTOSTERONE: CPT

## 2020-03-18 PROCEDURE — 84403 ASSAY OF TOTAL TESTOSTERONE: CPT

## 2020-03-18 PROCEDURE — 36415 COLL VENOUS BLD VENIPUNCTURE: CPT

## 2020-03-18 PROCEDURE — 85027 COMPLETE CBC AUTOMATED: CPT

## 2020-03-18 PROCEDURE — 84443 ASSAY THYROID STIM HORMONE: CPT

## 2020-03-22 LAB
TESTOSTERONE TOTAL: 309.2 NG/DL
TESTOSTERONE, FREE -MS/MS: 86.9 PG/ML

## 2020-04-17 RX ORDER — SPIRONOLACTONE 100 MG/1
TABLET, FILM COATED ORAL
Qty: 90 TABLET | Refills: 1 | Status: SHIPPED | OUTPATIENT
Start: 2020-04-17 | End: 2020-10-13

## 2020-06-05 RX ORDER — TESTOSTERONE CYPIONATE 200 MG/ML
200 INJECTION INTRAMUSCULAR
Qty: 10 ML | Refills: 0 | Status: SHIPPED | OUTPATIENT
Start: 2020-06-05 | End: 2020-10-13

## 2020-06-10 ENCOUNTER — TELEPHONE (OUTPATIENT)
Dept: ENDOCRINOLOGY CLINIC | Facility: CLINIC | Age: 55
End: 2020-06-10

## 2020-06-10 NOTE — TELEPHONE ENCOUNTER
Medication PA Requested: Testosterone 200mg/ml                                                          CoverMyMeds Used:  Yes  Key: [id number]  Sig: Inject 1 ml IM Q14 days  DX Code: F64.0                                     CPT code (if applicable):   Case

## 2020-06-10 NOTE — TELEPHONE ENCOUNTER
Need Prior Auth    Testosterone cypionate 200 MG/ML Intramuscular Solution, Inject 1 mL (200 mg total) into the muscle every 14 (fourteen) days. , Disp: 10 mL, Rfl: 0    Key: [id number]  Patient Last Name: Ciarra Gamez   :10/03/1965

## 2020-06-15 NOTE — TELEPHONE ENCOUNTER
Medication PA Requested:    Testosterone cypionate 200 MG/ML Intramuscular Solution                                                   CoverMyMeds Used:  Yes  Key: [id number]  Sig: Inject 1 ml IM Q14 days   DX Code: Transgender F64.0  Clinicals electronically

## 2020-06-17 NOTE — TELEPHONE ENCOUNTER
PA approved for Testosterone cypionate 200 MG/ML Intramuscular Solution.  Please watch for approval fax [phone]

## 2020-06-18 RX ORDER — OMEPRAZOLE 40 MG/1
CAPSULE, DELAYED RELEASE ORAL
Qty: 90 CAPSULE | Refills: 1 | Status: SHIPPED | OUTPATIENT
Start: 2020-06-18 | End: 2020-12-24

## 2020-06-19 RX ORDER — TESTOSTERONE CYPIONATE 200 MG/ML
200 INJECTION INTRAMUSCULAR
Qty: 10 ML | Refills: 0 | OUTPATIENT
Start: 2020-06-19

## 2020-06-19 NOTE — TELEPHONE ENCOUNTER
Duplicate entry. Please see telephone encounter 06/10/20. [facility] and patient picked up the medication.

## 2020-07-16 RX ORDER — LOSARTAN POTASSIUM 25 MG/1
25 TABLET ORAL DAILY
Qty: 90 TABLET | Refills: 1 | OUTPATIENT
Start: 2020-07-16

## 2020-07-16 NOTE — TELEPHONE ENCOUNTER
Losartan Potassium Oral Tablet 25 MG  Protocol Passed     LOV: 3/4/2020 with Thanh Ibrahim   RTC: 3 months f/u with Setlur   Upcoming OV: 7/30/2020   Filled: 1/20/2020 #90 1 refill   Recent labs: [facility] 3/18/2020

## 2020-07-17 ENCOUNTER — TELEPHONE (OUTPATIENT)
Dept: INTERNAL MEDICINE CLINIC | Facility: CLINIC | Age: 55
End: 2020-07-17

## 2020-07-17 RX ORDER — LOSARTAN POTASSIUM 25 MG/1
TABLET ORAL
Qty: 90 TABLET | Refills: 3 | Status: SHIPPED | OUTPATIENT
Start: 2020-07-17 | End: 2021-02-02

## 2020-07-30 ENCOUNTER — OFFICE VISIT (OUTPATIENT)
Dept: INTERNAL MEDICINE CLINIC | Facility: CLINIC | Age: 55
End: 2020-07-30
Payer: MEDICAID

## 2020-07-30 VITALS
HEIGHT: 63 IN | SYSTOLIC BLOOD PRESSURE: 142 MMHG | DIASTOLIC BLOOD PRESSURE: 78 MMHG | OXYGEN SATURATION: 98 % | BODY MASS INDEX: 31.71 KG/M2 | RESPIRATION RATE: 16 BRPM | TEMPERATURE: 98 F | HEART RATE: 84 BPM | WEIGHT: 179 LBS

## 2020-07-30 DIAGNOSIS — Z00.00 ENCOUNTER FOR ROUTINE ADULT MEDICAL EXAMINATION: Primary | ICD-10-CM

## 2020-07-30 DIAGNOSIS — R05.9 COUGH: ICD-10-CM

## 2020-07-30 DIAGNOSIS — I10 ESSENTIAL HYPERTENSION: ICD-10-CM

## 2020-07-30 DIAGNOSIS — Z12.11 SCREENING FOR COLON CANCER: ICD-10-CM

## 2020-07-30 DIAGNOSIS — Z12.31 ENCOUNTER FOR SCREENING MAMMOGRAM FOR BREAST CANCER: ICD-10-CM

## 2020-07-30 DIAGNOSIS — Z00.00 LABORATORY EXAMINATION ORDERED AS PART OF A COMPLETE PHYSICAL EXAMINATION: ICD-10-CM

## 2020-07-30 DIAGNOSIS — E78.2 MIXED HYPERLIPIDEMIA: ICD-10-CM

## 2020-07-30 PROCEDURE — 3008F BODY MASS INDEX DOCD: CPT | Performed by: INTERNAL MEDICINE

## 2020-07-30 PROCEDURE — 3077F SYST BP >= 140 MM HG: CPT | Performed by: INTERNAL MEDICINE

## 2020-07-30 PROCEDURE — 3078F DIAST BP <80 MM HG: CPT | Performed by: INTERNAL MEDICINE

## 2020-07-30 PROCEDURE — 99396 PREV VISIT EST AGE 40-64: CPT | Performed by: INTERNAL MEDICINE

## 2020-07-30 RX ORDER — BUDESONIDE AND FORMOTEROL FUMARATE DIHYDRATE 160; 4.5 UG/1; UG/1
2 AEROSOL RESPIRATORY (INHALATION) 2 TIMES DAILY
Qty: 2 INHALER | Refills: 0 | COMMUNITY
Start: 2020-07-30 | End: 2021-02-02

## 2020-07-30 NOTE — PROGRESS NOTES
[facility] Internal Medicine Office Note  Chief Complaint:   Patient presents with:  Physical  Shortness Of Breath: trouble breathing in the evening and night       HPI:   This is a 62year old male coming in for physical and shortness of breath/ tablet 3   • OMEPRAZOLE 40 MG Oral Capsule Delayed Release TAKE 1 CAPSULE BY MOUTH ONE TIME A DAY  90 capsule 1   • Testosterone cypionate 200 MG/ML Intramuscular Solution Inject 1 mL (200 mg total) into the muscle every 14 (fourteen) days.  10 mL 0   • SPI hyperlipidemia  Screening for colon cancer  Encounter for screening mammogram for breast cancer  Laboratory examination ordered as part of a complete physical examination      The plan is as follows  Whitney Sy was seen today for physical and shortness of (TQD=26226/01858)    Colonoscopy due on 10/03/2013  Annual Physical due on 04/24/2020  FIT Colorectal Screening due on 06/28/2020  Patient/Caregiver Education: Patient/Caregiver Education: There are no barriers to learning. Medical education done.  Outcome:

## 2020-08-11 ENCOUNTER — LAB ENCOUNTER (OUTPATIENT)
Dept: LAB | Age: 55
End: 2020-08-11
Attending: INTERNAL MEDICINE
Payer: MEDICAID

## 2020-08-11 DIAGNOSIS — D72.829 LEUKOCYTOSIS, UNSPECIFIED TYPE: ICD-10-CM

## 2020-08-11 DIAGNOSIS — Z00.00 LABORATORY EXAMINATION ORDERED AS PART OF A COMPLETE PHYSICAL EXAMINATION: ICD-10-CM

## 2020-08-11 DIAGNOSIS — R73.01 ELEVATED FASTING GLUCOSE: ICD-10-CM

## 2020-08-11 LAB
ALBUMIN SERPL-MCNC: 4 G/DL (ref 3.4–5)
ALBUMIN/GLOB SERPL: 1.1 {RATIO} (ref 1–2)
ALP LIVER SERPL-CCNC: 66 U/L (ref 45–117)
ALT SERPL-CCNC: 43 U/L (ref 16–61)
ANION GAP SERPL CALC-SCNC: 5 MMOL/L (ref 0–18)
AST SERPL-CCNC: 21 U/L (ref 15–37)
BASOPHILS # BLD AUTO: 0.06 X10(3) UL (ref 0–0.2)
BASOPHILS NFR BLD AUTO: 0.5 %
BILIRUB SERPL-MCNC: 0.4 MG/DL (ref 0.1–2)
BUN BLD-MCNC: 11 MG/DL (ref 7–18)
BUN/CREAT SERPL: 10.2 (ref 10–20)
CALCIUM BLD-MCNC: 9.5 MG/DL (ref 8.5–10.1)
CHLORIDE SERPL-SCNC: 105 MMOL/L (ref 98–112)
CHOLEST SMN-MCNC: 193 MG/DL (ref ?–200)
CO2 SERPL-SCNC: 25 MMOL/L (ref 21–32)
CREAT BLD-MCNC: 1.08 MG/DL (ref 0.7–1.3)
DEPRECATED RDW RBC AUTO: 40.2 FL (ref 35.1–46.3)
EOSINOPHIL # BLD AUTO: 0.79 X10(3) UL (ref 0–0.7)
EOSINOPHIL NFR BLD AUTO: 6.6 %
ERYTHROCYTE [DISTWIDTH] IN BLOOD BY AUTOMATED COUNT: 12.3 % (ref 11–15)
GLOBULIN PLAS-MCNC: 3.5 G/DL (ref 2.8–4.4)
GLUCOSE BLD-MCNC: 133 MG/DL (ref 70–99)
HCT VFR BLD AUTO: 51.3 % (ref 39–53)
HDLC SERPL-MCNC: 25 MG/DL (ref 40–59)
HGB BLD-MCNC: 17.2 G/DL (ref 13–17.5)
IMM GRANULOCYTES # BLD AUTO: 0.08 X10(3) UL (ref 0–1)
IMM GRANULOCYTES NFR BLD: 0.7 %
LDLC SERPL DIRECT ASSAY-MCNC: 116 MG/DL (ref ?–100)
LYMPHOCYTES # BLD AUTO: 3.19 X10(3) UL (ref 1–4)
LYMPHOCYTES NFR BLD AUTO: 26.7 %
M PROTEIN MFR SERPL ELPH: 7.5 G/DL (ref 6.4–8.2)
MCH RBC QN AUTO: 30.4 PG (ref 26–34)
MCHC RBC AUTO-ENTMCNC: 33.5 G/DL (ref 31–37)
MCV RBC AUTO: 90.8 FL (ref 80–100)
MONOCYTES # BLD AUTO: 0.95 X10(3) UL (ref 0.1–1)
MONOCYTES NFR BLD AUTO: 7.9 %
NEUTROPHILS # BLD AUTO: 6.88 X10 (3) UL (ref 1.5–7.7)
NEUTROPHILS # BLD AUTO: 6.88 X10(3) UL (ref 1.5–7.7)
NEUTROPHILS NFR BLD AUTO: 57.6 %
NONHDLC SERPL-MCNC: 168 MG/DL (ref ?–130)
OSMOLALITY SERPL CALC.SUM OF ELEC: 281 MOSM/KG (ref 275–295)
PATIENT FASTING Y/N/NP: YES
PATIENT FASTING Y/N/NP: YES
PLATELET # BLD AUTO: 226 10(3)UL (ref 150–450)
POTASSIUM SERPL-SCNC: 3.8 MMOL/L (ref 3.5–5.1)
RBC # BLD AUTO: 5.65 X10(6)UL (ref 4.3–5.7)
SODIUM SERPL-SCNC: 135 MMOL/L (ref 136–145)
TRIGL SERPL-MCNC: 417 MG/DL (ref 30–149)
TSI SER-ACNC: 2.51 MIU/ML (ref 0.36–3.74)
WBC # BLD AUTO: 12 X10(3) UL (ref 4–11)

## 2020-08-11 PROCEDURE — 80061 LIPID PANEL: CPT

## 2020-08-11 PROCEDURE — 36415 COLL VENOUS BLD VENIPUNCTURE: CPT

## 2020-08-11 PROCEDURE — 85025 COMPLETE CBC W/AUTO DIFF WBC: CPT

## 2020-08-11 PROCEDURE — 83721 ASSAY OF BLOOD LIPOPROTEIN: CPT

## 2020-08-11 PROCEDURE — 83036 HEMOGLOBIN GLYCOSYLATED A1C: CPT

## 2020-08-11 PROCEDURE — 84443 ASSAY THYROID STIM HORMONE: CPT

## 2020-08-11 PROCEDURE — 80053 COMPREHEN METABOLIC PANEL: CPT

## 2020-08-13 DIAGNOSIS — R73.01 ELEVATED FASTING GLUCOSE: Primary | ICD-10-CM

## 2020-08-13 DIAGNOSIS — D72.829 LEUKOCYTOSIS, UNSPECIFIED TYPE: Primary | ICD-10-CM

## 2020-08-13 LAB
EST. AVERAGE GLUCOSE BLD GHB EST-MCNC: 117 MG/DL (ref 68–126)
HBA1C MFR BLD HPLC: 5.7 % (ref ?–5.7)

## 2020-08-13 RX ORDER — ROSUVASTATIN CALCIUM 20 MG/1
20 TABLET, COATED ORAL NIGHTLY
Qty: 90 TABLET | Refills: 1 | Status: SHIPPED | OUTPATIENT
Start: 2020-08-13 | End: 2021-02-01

## 2020-08-13 RX ORDER — LOSARTAN POTASSIUM 25 MG/1
25 TABLET ORAL DAILY
Qty: 90 TABLET | Refills: 3 | OUTPATIENT
Start: 2020-08-13

## 2020-08-13 NOTE — TELEPHONE ENCOUNTER
Losartan Potassium 25 MG Oral Tab  Protocol Passed     Filled on 7/17/2020     Called pharmacy and spoke with Jovanni and she stated that they did receive the rx on 7/17/2020     Request was sent accidentally

## 2020-08-14 PROBLEM — R73.03 PRE-DIABETES: Status: ACTIVE | Noted: 2020-08-14

## 2020-08-27 ENCOUNTER — LAB ENCOUNTER (OUTPATIENT)
Dept: LAB | Facility: HOSPITAL | Age: 55
End: 2020-08-27
Attending: INTERNAL MEDICINE
Payer: MEDICAID

## 2020-08-27 DIAGNOSIS — Z12.11 SCREENING FOR COLON CANCER: ICD-10-CM

## 2020-08-27 PROCEDURE — 82274 ASSAY TEST FOR BLOOD FECAL: CPT

## 2020-09-01 LAB — HEMOCCULT STL QL: NEGATIVE

## 2020-09-17 ENCOUNTER — LAB ENCOUNTER (OUTPATIENT)
Dept: LAB | Age: 55
End: 2020-09-17
Attending: INTERNAL MEDICINE
Payer: MEDICAID

## 2020-09-17 DIAGNOSIS — D72.829 LEUKOCYTOSIS, UNSPECIFIED TYPE: ICD-10-CM

## 2020-09-17 LAB
BASOPHILS # BLD AUTO: 0.07 X10(3) UL (ref 0–0.2)
BASOPHILS NFR BLD AUTO: 0.6 %
DEPRECATED RDW RBC AUTO: 40.8 FL (ref 35.1–46.3)
EOSINOPHIL # BLD AUTO: 0.87 X10(3) UL (ref 0–0.7)
EOSINOPHIL NFR BLD AUTO: 7.7 %
ERYTHROCYTE [DISTWIDTH] IN BLOOD BY AUTOMATED COUNT: 12.4 % (ref 11–15)
HCT VFR BLD AUTO: 50 % (ref 39–53)
HGB BLD-MCNC: 17.1 G/DL (ref 13–17.5)
IMM GRANULOCYTES # BLD AUTO: 0.08 X10(3) UL (ref 0–1)
IMM GRANULOCYTES NFR BLD: 0.7 %
LYMPHOCYTES # BLD AUTO: 2.7 X10(3) UL (ref 1–4)
LYMPHOCYTES NFR BLD AUTO: 23.8 %
MCH RBC QN AUTO: 30.8 PG (ref 26–34)
MCHC RBC AUTO-ENTMCNC: 34.2 G/DL (ref 31–37)
MCV RBC AUTO: 90.1 FL (ref 80–100)
MONOCYTES # BLD AUTO: 0.98 X10(3) UL (ref 0.1–1)
MONOCYTES NFR BLD AUTO: 8.6 %
NEUTROPHILS # BLD AUTO: 6.63 X10 (3) UL (ref 1.5–7.7)
NEUTROPHILS # BLD AUTO: 6.63 X10(3) UL (ref 1.5–7.7)
NEUTROPHILS NFR BLD AUTO: 58.6 %
PLATELET # BLD AUTO: 209 10(3)UL (ref 150–450)
RBC # BLD AUTO: 5.55 X10(6)UL (ref 4.3–5.7)
WBC # BLD AUTO: 11.3 X10(3) UL (ref 4–11)

## 2020-09-17 PROCEDURE — 85025 COMPLETE CBC W/AUTO DIFF WBC: CPT

## 2020-09-17 PROCEDURE — 36415 COLL VENOUS BLD VENIPUNCTURE: CPT

## 2020-10-15 RX ORDER — SPIRONOLACTONE 100 MG/1
100 TABLET, FILM COATED ORAL DAILY
Qty: 90 TABLET | Refills: 1 | Status: SHIPPED | OUTPATIENT
Start: 2020-10-15 | End: 2020-10-15

## 2020-10-15 RX ORDER — SPIRONOLACTONE 100 MG/1
TABLET, FILM COATED ORAL
Qty: 90 TABLET | Refills: 0 | OUTPATIENT
Start: 2020-10-15

## 2020-10-15 RX ORDER — TESTOSTERONE CYPIONATE 200 MG/ML
200 INJECTION INTRAMUSCULAR
Qty: 10 ML | Refills: 0 | Status: SHIPPED | OUTPATIENT
Start: 2020-10-15 | End: 2020-11-04

## 2020-10-15 RX ORDER — SPIRONOLACTONE 100 MG/1
TABLET, FILM COATED ORAL
Qty: 90 TABLET | Refills: 0 | Status: SHIPPED | OUTPATIENT
Start: 2020-10-15 | End: 2021-04-08

## 2020-11-09 RX ORDER — TESTOSTERONE CYPIONATE 200 MG/ML
200 INJECTION INTRAMUSCULAR
Qty: 10 ML | Refills: 0 | Status: SHIPPED | OUTPATIENT
Start: 2020-11-09 | End: 2021-03-03

## 2020-12-24 RX ORDER — OMEPRAZOLE 40 MG/1
CAPSULE, DELAYED RELEASE ORAL
Qty: 90 CAPSULE | Refills: 1 | Status: SHIPPED | OUTPATIENT
Start: 2020-12-24 | End: 2021-05-26

## 2021-02-01 RX ORDER — ROSUVASTATIN CALCIUM 20 MG/1
TABLET, COATED ORAL
Qty: 90 TABLET | Refills: 0 | Status: SHIPPED | OUTPATIENT
Start: 2021-02-01 | End: 2021-05-05

## 2021-02-01 NOTE — TELEPHONE ENCOUNTER
Passed protocol     Last refill:  8/13/2020 Rosuvastatin 20 mg #90 1R    Last lipid - 8/11/2020    LOV:   7/30/2020 Dr David Huerta RTC ?   Mixed hyperlipidemia -cont statin  No FOV scheduled

## 2021-02-03 RX ORDER — LOSARTAN POTASSIUM 25 MG/1
25 TABLET ORAL DAILY
Qty: 90 TABLET | Refills: 3 | Status: SHIPPED | OUTPATIENT
Start: 2021-02-03 | End: 2021-06-25

## 2021-02-03 RX ORDER — BUDESONIDE AND FORMOTEROL FUMARATE DIHYDRATE 160; 4.5 UG/1; UG/1
2 AEROSOL RESPIRATORY (INHALATION) 2 TIMES DAILY
Qty: 3 INHALER | Refills: 3 | Status: SHIPPED | OUTPATIENT
Start: 2021-02-03 | End: 2021-06-25

## 2021-03-04 RX ORDER — SYRINGE W-NEEDLE,DISPOSAB,3 ML 25GX5/8"
SYRINGE, EMPTY DISPOSABLE MISCELLANEOUS
Qty: 50 EACH | Refills: 0 | Status: SHIPPED | OUTPATIENT
Start: 2021-03-04

## 2021-03-04 RX ORDER — TESTOSTERONE CYPIONATE 200 MG/ML
200 INJECTION INTRAMUSCULAR
Qty: 10 ML | Refills: 0 | Status: SHIPPED | OUTPATIENT
Start: 2021-03-04 | End: 2021-04-08

## 2021-04-08 RX ORDER — SPIRONOLACTONE 100 MG/1
TABLET, FILM COATED ORAL
Qty: 90 TABLET | Refills: 0 | OUTPATIENT
Start: 2021-04-08

## 2021-04-08 RX ORDER — BUDESONIDE AND FORMOTEROL FUMARATE DIHYDRATE 160; 4.5 UG/1; UG/1
2 AEROSOL RESPIRATORY (INHALATION) 2 TIMES DAILY
Qty: 3 INHALER | Refills: 3 | Status: CANCELLED | OUTPATIENT
Start: 2021-04-08

## 2021-04-08 RX ORDER — TESTOSTERONE CYPIONATE 200 MG/ML
200 INJECTION INTRAMUSCULAR
Qty: 10 ML | Refills: 0 | Status: SHIPPED | OUTPATIENT
Start: 2021-04-08 | End: 2021-09-12

## 2021-04-08 RX ORDER — SPIRONOLACTONE 100 MG/1
100 TABLET, FILM COATED ORAL DAILY
Qty: 90 TABLET | Refills: 0 | Status: SHIPPED | OUTPATIENT
Start: 2021-04-08 | End: 2021-04-12

## 2021-04-08 NOTE — TELEPHONE ENCOUNTER
Failed protocol     Last refill:  Medication Quantity Refills Start End   Budesonide-Formoterol Fumarate (SYMBICORT) 160-4.5 MCG/ACT Inhalation Aerosol 3 Inhaler 3 2/3/2021    Sig:   Inhale 2 puffs into the lungs 2 (two) times daily.        LOV:   7/30/2020

## 2021-04-12 RX ORDER — SPIRONOLACTONE 100 MG/1
TABLET, FILM COATED ORAL
Qty: 90 TABLET | Refills: 0 | Status: SHIPPED | OUTPATIENT
Start: 2021-04-12 | End: 2021-06-25

## 2021-04-15 ENCOUNTER — TELEPHONE (OUTPATIENT)
Dept: INTERNAL MEDICINE CLINIC | Facility: CLINIC | Age: 56
End: 2021-04-15

## 2021-04-15 ENCOUNTER — TELEMEDICINE (OUTPATIENT)
Dept: ENDOCRINOLOGY CLINIC | Facility: CLINIC | Age: 56
End: 2021-04-15

## 2021-04-15 DIAGNOSIS — Z78.9 FEMALE-TO-MALE TRANSGENDER PERSON: Primary | ICD-10-CM

## 2021-04-15 DIAGNOSIS — Z78.9 TRANSGENDER: Primary | ICD-10-CM

## 2021-04-15 PROCEDURE — 99213 OFFICE O/P EST LOW 20 MIN: CPT | Performed by: INTERNAL MEDICINE

## 2021-04-15 NOTE — PROGRESS NOTES
Please note that the following visit was completed using two-way, real-time interactive audio and video communication.   This has been done in good tammie to provide continuity of care in the best interest of the provider-patient relationship, due to the frida therapy. Normal electrolytes. #2 Testosterone  Of note he is also transgender patient and started on testosterone therapy in 1995 initially per physician. Since last visit his testosterone dose was increased to 200mg IM Every 2 weeks.   He is feeling Allergies:   No Known Allergies    Social History:   Social History    Socioeconomic History      Marital status:       Spouse name: Not on file      Number of children: Not on file      Years of education: Not on file      Highest education lev reduction   - Further management based on above results     RTC 6 months     4/15/2021  Edelmira Fisher MD

## 2021-04-15 NOTE — TELEPHONE ENCOUNTER
Pt called requesting a referral for a breast reconstruction. Dr. Chad Palacios recommended Edwige Hairston at the [facility]. Please advise. Pt also had an upcoming appointment scheduled.      Future Appointments   Date Time Provider Alexandria Mcgraw   5/

## 2021-04-16 ENCOUNTER — LAB ENCOUNTER (OUTPATIENT)
Dept: LAB | Age: 56
End: 2021-04-16
Attending: INTERNAL MEDICINE
Payer: MEDICAID

## 2021-04-16 DIAGNOSIS — Z78.9 TRANSGENDER: ICD-10-CM

## 2021-04-16 PROCEDURE — 82670 ASSAY OF TOTAL ESTRADIOL: CPT

## 2021-04-16 PROCEDURE — 80053 COMPREHEN METABOLIC PANEL: CPT

## 2021-04-16 PROCEDURE — 84402 ASSAY OF FREE TESTOSTERONE: CPT

## 2021-04-16 PROCEDURE — 84403 ASSAY OF TOTAL TESTOSTERONE: CPT

## 2021-04-16 PROCEDURE — 84146 ASSAY OF PROLACTIN: CPT

## 2021-04-16 PROCEDURE — 83001 ASSAY OF GONADOTROPIN (FSH): CPT

## 2021-04-16 PROCEDURE — 85027 COMPLETE CBC AUTOMATED: CPT

## 2021-04-16 PROCEDURE — 36415 COLL VENOUS BLD VENIPUNCTURE: CPT

## 2021-05-04 ENCOUNTER — TELEMEDICINE (OUTPATIENT)
Dept: INTERNAL MEDICINE CLINIC | Facility: CLINIC | Age: 56
End: 2021-05-04

## 2021-05-04 DIAGNOSIS — Z78.9 TRANSGENDER: ICD-10-CM

## 2021-05-04 DIAGNOSIS — E78.2 MIXED HYPERLIPIDEMIA: ICD-10-CM

## 2021-05-04 DIAGNOSIS — Z12.31 ENCOUNTER FOR SCREENING MAMMOGRAM FOR BREAST CANCER: ICD-10-CM

## 2021-05-04 DIAGNOSIS — R73.01 ELEVATED FASTING GLUCOSE: ICD-10-CM

## 2021-05-04 DIAGNOSIS — I10 ESSENTIAL HYPERTENSION: Primary | ICD-10-CM

## 2021-05-04 PROCEDURE — 99213 OFFICE O/P EST LOW 20 MIN: CPT | Performed by: INTERNAL MEDICINE

## 2021-05-04 NOTE — PROGRESS NOTES
This is a telemedicine visit with live, interactive video and audio. Patient understands and accepts financial responsibility for any deductible, co-insurance and/or co-pays associated with this service.     SUBJECTIVE  HTN - in normal range at home Exam:   In no acute distress. No increased work of breathing. Alert and oriented.      ASSESSMENT & PLAN  Diagnoses and all orders for this visit:    Essential hypertension - BP at goal at home; cont present management     Mixed hyperlipidemia -check with n

## 2021-05-05 RX ORDER — ROSUVASTATIN CALCIUM 20 MG/1
TABLET, COATED ORAL
Qty: 90 TABLET | Refills: 0 | Status: SHIPPED | OUTPATIENT
Start: 2021-05-05 | End: 2021-05-07

## 2021-05-05 NOTE — TELEPHONE ENCOUNTER
Passed protocol     Last refill:  ROSUVASTATIN CALCIUM 20 MG Oral Tab 90 tablet 0 2/1/2021    Sig:   TAKE 1 TABLET BY MOUTH IN THE EVENING        Last telemedicne 5/4/2021 Dr Scott Olson   Mixed hyperlipidemia -check with next labs   -     LIPID PANEL;  Future

## 2021-05-07 ENCOUNTER — LAB ENCOUNTER (OUTPATIENT)
Dept: LAB | Age: 56
End: 2021-05-07
Attending: INTERNAL MEDICINE
Payer: MEDICAID

## 2021-05-07 ENCOUNTER — HOSPITAL ENCOUNTER (OUTPATIENT)
Dept: MAMMOGRAPHY | Age: 56
Discharge: HOME | End: 2021-05-07
Attending: INTERNAL MEDICINE
Payer: MEDICAID

## 2021-05-07 DIAGNOSIS — Z78.9 TRANSGENDER: ICD-10-CM

## 2021-05-07 DIAGNOSIS — Z12.31 ENCOUNTER FOR SCREENING MAMMOGRAM FOR BREAST CANCER: ICD-10-CM

## 2021-05-07 DIAGNOSIS — R73.01 ELEVATED FASTING GLUCOSE: ICD-10-CM

## 2021-05-07 DIAGNOSIS — E78.2 MIXED HYPERLIPIDEMIA: ICD-10-CM

## 2021-05-07 PROCEDURE — 36415 COLL VENOUS BLD VENIPUNCTURE: CPT

## 2021-05-07 PROCEDURE — 77063 BREAST TOMOSYNTHESIS BI: CPT | Performed by: INTERNAL MEDICINE

## 2021-05-07 PROCEDURE — 80061 LIPID PANEL: CPT

## 2021-05-07 PROCEDURE — 77067 SCR MAMMO BI INCL CAD: CPT | Performed by: INTERNAL MEDICINE

## 2021-05-07 PROCEDURE — 83036 HEMOGLOBIN GLYCOSYLATED A1C: CPT

## 2021-05-07 RX ORDER — ROSUVASTATIN CALCIUM 20 MG/1
TABLET, COATED ORAL
Qty: 90 TABLET | Refills: 0 | Status: SHIPPED | OUTPATIENT
Start: 2021-05-07 | End: 2021-07-21

## 2021-05-07 NOTE — TELEPHONE ENCOUNTER
Name from pharmacy: Rosuvastatin Calcium Oral Tablet 20 MG         Will file in chart as: ROSUVASTATIN CALCIUM 20 MG Oral Tab    Sig: TAKE 1 TABLET BY MOUTH IN THE EVENING     Disp:  90 tablet    Refills:  0    Start: 5/6/2021    Class: Normal    Non-form

## 2021-05-27 RX ORDER — OMEPRAZOLE 40 MG/1
40 CAPSULE, DELAYED RELEASE ORAL DAILY
Qty: 90 CAPSULE | Refills: 1 | Status: SHIPPED | OUTPATIENT
Start: 2021-05-27 | End: 2021-06-25

## 2021-05-27 NOTE — TELEPHONE ENCOUNTER
No protocol     Last refill:  12/24/2020 Omeprazole 40 mg #90 1R    LOV:   5/4/2021 Dr Scott Olson RTC ?   No FOV scheduled

## 2021-06-28 RX ORDER — LOSARTAN POTASSIUM 25 MG/1
25 TABLET ORAL DAILY
Qty: 90 TABLET | Refills: 3 | Status: SHIPPED | OUTPATIENT
Start: 2021-06-28

## 2021-06-28 RX ORDER — SPIRONOLACTONE 100 MG/1
100 TABLET, FILM COATED ORAL DAILY
Qty: 90 TABLET | Refills: 0 | Status: SHIPPED | OUTPATIENT
Start: 2021-06-28 | End: 2022-01-03

## 2021-06-28 RX ORDER — OMEPRAZOLE 40 MG/1
40 CAPSULE, DELAYED RELEASE ORAL DAILY
Qty: 90 CAPSULE | Refills: 3 | Status: SHIPPED | OUTPATIENT
Start: 2021-06-28 | End: 2021-12-20

## 2021-06-28 RX ORDER — BUDESONIDE AND FORMOTEROL FUMARATE DIHYDRATE 160; 4.5 UG/1; UG/1
2 AEROSOL RESPIRATORY (INHALATION) 2 TIMES DAILY
Qty: 3 EACH | Refills: 3 | Status: SHIPPED | OUTPATIENT
Start: 2021-06-28

## 2021-06-28 NOTE — TELEPHONE ENCOUNTER
Losartan Potassium 25 MG Oral Tab         Sig: Take 1 tablet (25 mg total) by mouth daily.     Disp:  90 tablet    Refills:  3    Start: 6/25/2021    Class: Normal    Non-formulary    Last ordered: 4 months ago by Harsh Brownlee MD     Hypertension [facility]

## 2021-07-22 RX ORDER — ROSUVASTATIN CALCIUM 20 MG/1
20 TABLET, COATED ORAL EVERY EVENING
Qty: 90 TABLET | Refills: 0 | Status: SHIPPED | OUTPATIENT
Start: 2021-07-22 | End: 2021-10-22

## 2021-07-22 NOTE — TELEPHONE ENCOUNTER
Protocol passed  Medication(s) to Refill:   Requested Prescriptions     Pending Prescriptions Disp Refills   • Rosuvastatin Calcium 20 MG Oral Tab 90 tablet 0     Sig: Take 1 tablet (20 mg total) by mouth every evening.        LOV: 5/4/21   RTC: none noted

## 2021-09-13 RX ORDER — TESTOSTERONE CYPIONATE 200 MG/ML
200 INJECTION INTRAMUSCULAR
Qty: 10 ML | Refills: 0 | Status: SHIPPED | OUTPATIENT
Start: 2021-09-13 | End: 2021-10-21

## 2021-09-15 ENCOUNTER — PATIENT MESSAGE (OUTPATIENT)
Dept: ENDOCRINOLOGY CLINIC | Facility: CLINIC | Age: 56
End: 2021-09-15

## 2021-09-15 ENCOUNTER — TELEPHONE (OUTPATIENT)
Dept: ENDOCRINOLOGY CLINIC | Facility: CLINIC | Age: 56
End: 2021-09-15

## 2021-09-15 NOTE — TELEPHONE ENCOUNTER
Medication PA Requested: Testosterone 200 mg/ml                                                          CoverMyMeds Used: No  Key:  [id number]  Quantity: 10 ml  Day Supply: 5 months  Sig: Inject 200 mg (1 ML) into the muscle every 14 days  DX Code: Z78.9

## 2021-09-15 NOTE — TELEPHONE ENCOUNTER
From: Jerica Fajardo  To: Scarlett Lew MD  Sent: 9/15/2021 10:27 AM CDT  Subject: Prescription refill authorization     The following prescription was approved by you and was be filled at [facility], [address] [place] [address] [phone]

## 2021-09-15 NOTE — TELEPHONE ENCOUNTER
Medication PA Requested: Testosterone 200 mg/ml                                                          CoverMyMeds Used: No  Key:  Quantity: 10 ml  Day Supply: 5 months  Sig: Inject 200 mg (1 ML) into the muscle every 14 days  DX Code: Z78.9

## 2021-09-17 NOTE — TELEPHONE ENCOUNTER
No update in cover my meds, called [employer] [phone]. Spoke with Carolynn WILLIAMSON.  She states was approved, auth # [id number],  Effective 9/1/2021-9/16/2022.   Call ref # [id number]    [address] [place] [address], [phone], spoke

## 2021-10-13 ENCOUNTER — LAB ENCOUNTER (OUTPATIENT)
Dept: LAB | Age: 56
End: 2021-10-13
Attending: INTERNAL MEDICINE
Payer: MEDICAID

## 2021-10-13 ENCOUNTER — OFFICE VISIT (OUTPATIENT)
Dept: INTERNAL MEDICINE CLINIC | Facility: CLINIC | Age: 56
End: 2021-10-13
Payer: MEDICAID

## 2021-10-13 VITALS
BODY MASS INDEX: 30.65 KG/M2 | RESPIRATION RATE: 16 BRPM | DIASTOLIC BLOOD PRESSURE: 58 MMHG | OXYGEN SATURATION: 99 % | WEIGHT: 173 LBS | HEIGHT: 63 IN | SYSTOLIC BLOOD PRESSURE: 120 MMHG | TEMPERATURE: 98 F | HEART RATE: 71 BPM

## 2021-10-13 DIAGNOSIS — Z00.00 LABORATORY EXAM ORDERED AS PART OF ROUTINE GENERAL MEDICAL EXAMINATION: ICD-10-CM

## 2021-10-13 DIAGNOSIS — Z00.00 ENCOUNTER FOR ROUTINE ADULT MEDICAL EXAMINATION: Primary | ICD-10-CM

## 2021-10-13 DIAGNOSIS — R31.0 GROSS HEMATURIA: ICD-10-CM

## 2021-10-13 DIAGNOSIS — Z23 NEED FOR INFLUENZA VACCINATION: ICD-10-CM

## 2021-10-13 PROCEDURE — 3008F BODY MASS INDEX DOCD: CPT | Performed by: INTERNAL MEDICINE

## 2021-10-13 PROCEDURE — 84443 ASSAY THYROID STIM HORMONE: CPT | Performed by: INTERNAL MEDICINE

## 2021-10-13 PROCEDURE — 99396 PREV VISIT EST AGE 40-64: CPT | Performed by: INTERNAL MEDICINE

## 2021-10-13 PROCEDURE — 81003 URINALYSIS AUTO W/O SCOPE: CPT | Performed by: INTERNAL MEDICINE

## 2021-10-13 PROCEDURE — 90686 IIV4 VACC NO PRSV 0.5 ML IM: CPT | Performed by: INTERNAL MEDICINE

## 2021-10-13 PROCEDURE — 3078F DIAST BP <80 MM HG: CPT | Performed by: INTERNAL MEDICINE

## 2021-10-13 PROCEDURE — 85025 COMPLETE CBC W/AUTO DIFF WBC: CPT | Performed by: INTERNAL MEDICINE

## 2021-10-13 PROCEDURE — 36415 COLL VENOUS BLD VENIPUNCTURE: CPT | Performed by: INTERNAL MEDICINE

## 2021-10-13 PROCEDURE — 90471 IMMUNIZATION ADMIN: CPT | Performed by: INTERNAL MEDICINE

## 2021-10-13 PROCEDURE — 3074F SYST BP LT 130 MM HG: CPT | Performed by: INTERNAL MEDICINE

## 2021-10-13 PROCEDURE — 80061 LIPID PANEL: CPT | Performed by: INTERNAL MEDICINE

## 2021-10-13 PROCEDURE — 80053 COMPREHEN METABOLIC PANEL: CPT | Performed by: INTERNAL MEDICINE

## 2021-10-13 NOTE — PROGRESS NOTES
[facility] Internal Medicine Office Note  Chief Complaint:   Patient presents with:  Urinary Symptoms      HPI:   This is a 60year old adult coming in for physical   HPI    Blood in urine. Happening intermittently over the past few months.  Last Tab Take 1 tablet (20 mg total) by mouth every evening. 90 tablet 0   • spironolactone 100 MG Oral Tab Take 1 tablet (100 mg total) by mouth daily. 90 tablet 0   • Losartan Potassium 25 MG Oral Tab Take 1 tablet (25 mg total) by mouth daily.  90 tablet 3 and dry. Neurological:      General: No focal deficit present. Mental Status: He is alert.    Psychiatric:         Mood and Affect: Mood normal.          ASSESSMENT AND PLAN:   Nate Prince is a 60year old adult with  Encounter for routine camryn done  Annual Depression Screen due on 03/04/2021  Annual Physical due on 07/30/2021  FIT/FOBT Colorectal Screening due on 08/27/2021  Influenza Vaccine(1) due on 10/01/2021  Patient/Caregiver Education: Patient/Caregiver Education: There are no barriers to

## 2021-10-14 DIAGNOSIS — D72.829 LEUKOCYTOSIS, UNSPECIFIED TYPE: Primary | ICD-10-CM

## 2021-10-19 ENCOUNTER — OFFICE VISIT (OUTPATIENT)
Dept: SURGERY | Facility: CLINIC | Age: 56
End: 2021-10-19
Payer: MEDICAID

## 2021-10-19 ENCOUNTER — TELEPHONE (OUTPATIENT)
Dept: INTERNAL MEDICINE CLINIC | Facility: CLINIC | Age: 56
End: 2021-10-19

## 2021-10-19 VITALS — BODY MASS INDEX: 30.86 KG/M2 | HEIGHT: 63 IN | WEIGHT: 174.19 LBS

## 2021-10-19 DIAGNOSIS — Z78.9 FEMALE-TO-MALE TRANSGENDER PERSON: Primary | ICD-10-CM

## 2021-10-19 PROCEDURE — 99243 OFF/OP CNSLTJ NEW/EST LOW 30: CPT | Performed by: SURGERY

## 2021-10-19 PROCEDURE — 3008F BODY MASS INDEX DOCD: CPT | Performed by: SURGERY

## 2021-10-19 NOTE — TELEPHONE ENCOUNTER
Please call patient that I received the office note from Dr. Isaiah Maya.  Let him know as part of the pre-operative clearance, he will need to meet with endocrinology for further testing regarding possible cortisol deficiency at time of last surgery as this iss

## 2021-10-19 NOTE — CONSULTS
New Patient Consultation    This is the first visit for this 60year old male interested in top surgery. History of Present Illness: The patient is a 60year old transgender male referred for evaluation for top surgery.   The patient relates that he ha mg total) by mouth daily. , Disp: 90 capsule, Rfl: 3  Syringe 22G X 1\" 3 ML Does not apply Misc, Use to inject Testosterone every 14 days. , Disp: 50 each, Rfl: 0    No current facility-administered medications on file prior to visit.         Allergies:    N needing to get up at night to urinate, urinary hesitancy or retaining urine, painful urination, urinary incontinence, decreased urine stream, blood in the urine, or vaginal/penile discharge.     Skin:   The patient denies rash, itching, skin lesions, dry sk Measurements: Sternal notch to nipple 28.5 cm on the left and 28 cm on the right. Base diameter is 15 cm bilaterally. Nipple to inframammary fold is 10 cm on the left and 9 cm on the right. Moderate axillary lipodystrophy is noted bilaterally.     Lymph

## 2021-10-20 ENCOUNTER — TELEPHONE (OUTPATIENT)
Dept: ENDOCRINOLOGY CLINIC | Facility: CLINIC | Age: 56
End: 2021-10-20

## 2021-10-20 NOTE — TELEPHONE ENCOUNTER
Patient states that he currently sees an endocrinologist, Dr. Agustin.  States that testing was done for cortisol deficiency a few months ago and testing was normal. Advised patient to reach out to their office to either provide patient with a clearance

## 2021-10-20 NOTE — TELEPHONE ENCOUNTER
Never mind - I did see note that he is planning for mastectomy but I do think he will need appointment. Ok to add patient in cancellation spot. Or ok to book at 9:45 on a Thursday. Thanks.

## 2021-10-21 ENCOUNTER — OFFICE VISIT (OUTPATIENT)
Dept: ENDOCRINOLOGY CLINIC | Facility: CLINIC | Age: 56
End: 2021-10-21
Payer: MEDICAID

## 2021-10-21 VITALS
DIASTOLIC BLOOD PRESSURE: 72 MMHG | BODY MASS INDEX: 30 KG/M2 | HEART RATE: 84 BPM | SYSTOLIC BLOOD PRESSURE: 122 MMHG | WEIGHT: 170 LBS

## 2021-10-21 DIAGNOSIS — E27.40 ADRENAL INSUFFICIENCY (HCC): ICD-10-CM

## 2021-10-21 DIAGNOSIS — Z78.9 TRANSGENDER: Primary | ICD-10-CM

## 2021-10-21 PROCEDURE — 99214 OFFICE O/P EST MOD 30 MIN: CPT | Performed by: INTERNAL MEDICINE

## 2021-10-21 PROCEDURE — 3078F DIAST BP <80 MM HG: CPT | Performed by: INTERNAL MEDICINE

## 2021-10-21 PROCEDURE — 3074F SYST BP LT 130 MM HG: CPT | Performed by: INTERNAL MEDICINE

## 2021-10-21 RX ORDER — TESTOSTERONE CYPIONATE 200 MG/ML
300 INJECTION INTRAMUSCULAR
Qty: 10 ML | Refills: 1 | Status: SHIPPED | OUTPATIENT
Start: 2021-10-21

## 2021-10-21 RX ORDER — HYDROCORTISONE 20 MG/1
20 TABLET ORAL 2 TIMES DAILY
Qty: 20 TABLET | Refills: 1 | Status: SHIPPED | OUTPATIENT
Start: 2021-10-21

## 2021-10-21 NOTE — PATIENT INSTRUCTIONS
INCREASE Testosterone to 300mg IM every 2 weeks    REPEAT Blood work in 6 weeks - one week after injection    FOR SURGERY:    The Day before take Hydrocortisone 20mg 2 times per day    After surgery Hydrocortisone 20mg 2 times per day for 3 days

## 2021-10-21 NOTE — PROGRESS NOTES
Name: Montez Remy  Date: 10/21/2021    Referring Physician: No ref. provider found    Patient presents with: Follow - Up: Patient here today for Surgery clearance.        HISTORY OF PRESENT ILLNESS   Montez Remy is a 60year old male who pres or palpitations  Gastrointestinal:  no abdominal pain, bowel movement problems  Musculoskeletal: no muscle pain or arthralgia  /Gyne: no frequency or discomfort while urinating  Psychiatric:  no acute distress, anxiety  or depression  Skin: normal moistu Appendicitis 1979   • Hypertension        Surgical history:   Past Surgical History:   Procedure Laterality Date   • CHOLECYSTECTOMY  2008    [place], [place]       PHYSICAL EXAM  /72   Pulse 84   Wt 170 lb (77.1 kg)   BMI 30.11 kg/m²     General Appearan

## 2021-10-22 RX ORDER — ROSUVASTATIN CALCIUM 20 MG/1
TABLET, COATED ORAL
Qty: 90 TABLET | Refills: 3 | Status: ON HOLD | OUTPATIENT
Start: 2021-10-22 | End: 2022-01-20

## 2021-10-26 ENCOUNTER — TELEPHONE (OUTPATIENT)
Dept: SURGERY | Facility: CLINIC | Age: 56
End: 2021-10-26

## 2021-10-26 NOTE — TELEPHONE ENCOUNTER
Called patient and left a voicemail to obtain more information for insurance approval. Per Joe Beverly, Cosmetic Coordinator, psych clearance needed to submit with insurance.

## 2021-11-09 ENCOUNTER — TELEPHONE (OUTPATIENT)
Dept: SURGERY | Facility: CLINIC | Age: 56
End: 2021-11-09

## 2021-11-09 NOTE — TELEPHONE ENCOUNTER
Patient called to inform us his surgery has been approved by insurance. Sent IB to PSR's for scheduling.

## 2021-11-28 PROBLEM — D49.4 BLADDER TUMOR: Status: ACTIVE | Noted: 2021-11-28

## 2021-12-04 ENCOUNTER — HOSPITAL ENCOUNTER (OUTPATIENT)
Dept: CT IMAGING | Age: 56
Discharge: HOME | End: 2021-12-04
Attending: UROLOGY
Payer: MEDICAID

## 2021-12-04 DIAGNOSIS — R31.0 GROSS HEMATURIA: ICD-10-CM

## 2021-12-04 DIAGNOSIS — D49.4 BLADDER TUMOR: ICD-10-CM

## 2021-12-04 PROCEDURE — 74178 CT ABD&PLV WO CNTR FLWD CNTR: CPT | Performed by: UROLOGY

## 2021-12-04 PROCEDURE — 82565 ASSAY OF CREATININE: CPT

## 2021-12-06 ENCOUNTER — TELEPHONE (OUTPATIENT)
Dept: OBGYN CLINIC | Facility: CLINIC | Age: 56
End: 2021-12-06

## 2021-12-07 ENCOUNTER — OFFICE VISIT (OUTPATIENT)
Dept: SURGERY | Facility: CLINIC | Age: 56
End: 2021-12-07
Payer: MEDICAID

## 2021-12-07 DIAGNOSIS — Z78.9 FEMALE-TO-MALE TRANSGENDER PERSON: Primary | ICD-10-CM

## 2021-12-07 PROCEDURE — 99212 OFFICE O/P EST SF 10 MIN: CPT | Performed by: SURGERY

## 2021-12-07 NOTE — TELEPHONE ENCOUNTER
Per Dr. Bowden Flow, phone call to patient to see if he would be able to come for office visit on 12/8 at 4:30. Message was left for patient to call back.

## 2021-12-07 NOTE — PATIENT INSTRUCTIONS
Surgeon:         Dr. Brian Foy                                        Tel:        [phone]                                  Fax:        [phone]    Surgery/Procedure:      Bilateral subtotal mastectomy, free nipple grafts    General anesthesia R H&P  Endocrinologist. Dr. Ry Chin, to make in writing recommendations regarding stress dose steroids for sejal-operative cortisol management  CBC, CMP, EKG required  Informed consent done 12/7  Photos previously done at initial consult

## 2021-12-07 NOTE — PROGRESS NOTES
Mery Robin is a 60year old transgender male who presents for a preoperative visit in anticipation of bilateral mastectomy and free nipple grafting with axillary liposuction. He is without new complaints.     Physical Examination:  Breasts: Breast

## 2021-12-08 ENCOUNTER — OFFICE VISIT (OUTPATIENT)
Dept: OBGYN CLINIC | Facility: CLINIC | Age: 56
End: 2021-12-08
Payer: MEDICAID

## 2021-12-08 VITALS
HEIGHT: 63 IN | DIASTOLIC BLOOD PRESSURE: 79 MMHG | BODY MASS INDEX: 29.95 KG/M2 | SYSTOLIC BLOOD PRESSURE: 135 MMHG | HEART RATE: 73 BPM | WEIGHT: 169 LBS

## 2021-12-08 DIAGNOSIS — Z87.42 HX OF ENDOMETRIOSIS: ICD-10-CM

## 2021-12-08 DIAGNOSIS — Z78.9 FEMALE-TO-MALE TRANSGENDER PERSON: ICD-10-CM

## 2021-12-08 DIAGNOSIS — N85.8 UTERINE MASS: Primary | ICD-10-CM

## 2021-12-08 DIAGNOSIS — R10.2 PELVIC PAIN: ICD-10-CM

## 2021-12-08 DIAGNOSIS — Z87.890: ICD-10-CM

## 2021-12-08 PROCEDURE — 3075F SYST BP GE 130 - 139MM HG: CPT | Performed by: OBSTETRICS & GYNECOLOGY

## 2021-12-08 PROCEDURE — 3008F BODY MASS INDEX DOCD: CPT | Performed by: OBSTETRICS & GYNECOLOGY

## 2021-12-08 PROCEDURE — 3078F DIAST BP <80 MM HG: CPT | Performed by: OBSTETRICS & GYNECOLOGY

## 2021-12-08 PROCEDURE — 99215 OFFICE O/P EST HI 40 MIN: CPT | Performed by: OBSTETRICS & GYNECOLOGY

## 2021-12-09 ENCOUNTER — TELEPHONE (OUTPATIENT)
Dept: INTERNAL MEDICINE CLINIC | Facility: CLINIC | Age: 56
End: 2021-12-09

## 2021-12-09 NOTE — TELEPHONE ENCOUNTER
Pt called to schedule his pre-op. Pt sates she is getting bladder surgery to remove tumors on 1/10 w/ .     Informed pt Thomas Odonnell is booked up until maternity leave. Pt is refuses to see any other doctor and insists on only seeing LS.      15 minute s

## 2021-12-09 NOTE — PROGRESS NOTES
HPI:   Lexine Apgar is a 60year old adult presents for consultation regarding sex assignment surgery. Patient is a transgender of female to male. Recently diagnosed with bladder cancer and having surgery done on January 10, 2022.   Would like to (two) times daily. 3 each 3   • Omeprazole 40 MG Oral Capsule Delayed Release Take 1 capsule (40 mg total) by mouth daily. 90 capsule 3   • Syringe 22G X 1\" 3 ML Does not apply Misc Use to inject Testosterone every 14 days.  50 each 0         Allergies:  N

## 2021-12-10 NOTE — TELEPHONE ENCOUNTER
Future Appointments   Date Time Provider Alexandria Mcgraw   12/15/2021 11:45 AM Juan J Correa MD [facility] 8 [facility] [place]   12/30/2021  3:00 PM [facility]   1/10/2022  7:00 AM Dami Griffiths MD [facility]    1/26/2022 11:00 AM Dami Griffiths MD NP

## 2021-12-10 NOTE — PROGRESS NOTES
Results have been reviewed by Dr. Frieda Mcneil - see TE from 12/6/21. Planning on cystoscopy, TURBT, bilateral RGPG, post-op instillation of gemcitabine on 1/10/22. Follows with Dr. Kira Mar - see office note dated 12/8/21. Dr. Kira Mar has ordered a US pelvis.

## 2021-12-13 ENCOUNTER — TELEPHONE (OUTPATIENT)
Dept: OBGYN CLINIC | Facility: CLINIC | Age: 56
End: 2021-12-13

## 2021-12-15 ENCOUNTER — ULTRASOUND ENCOUNTER (OUTPATIENT)
Dept: OBGYN CLINIC | Facility: CLINIC | Age: 56
End: 2021-12-15
Payer: MEDICAID

## 2021-12-15 ENCOUNTER — OFFICE VISIT (OUTPATIENT)
Dept: INTERNAL MEDICINE CLINIC | Facility: CLINIC | Age: 56
End: 2021-12-15
Payer: MEDICAID

## 2021-12-15 VITALS
DIASTOLIC BLOOD PRESSURE: 72 MMHG | HEART RATE: 83 BPM | WEIGHT: 169.38 LBS | OXYGEN SATURATION: 98 % | RESPIRATION RATE: 16 BRPM | TEMPERATURE: 97 F | BODY MASS INDEX: 30.01 KG/M2 | HEIGHT: 63 IN | SYSTOLIC BLOOD PRESSURE: 124 MMHG

## 2021-12-15 DIAGNOSIS — Z78.9 FEMALE-TO-MALE TRANSGENDER PERSON: ICD-10-CM

## 2021-12-15 DIAGNOSIS — Z01.810 PREOPERATIVE CARDIOVASCULAR EXAMINATION: Primary | ICD-10-CM

## 2021-12-15 DIAGNOSIS — Z01.818 PREOPERATIVE TESTING: ICD-10-CM

## 2021-12-15 DIAGNOSIS — I10 ESSENTIAL HYPERTENSION: ICD-10-CM

## 2021-12-15 DIAGNOSIS — E78.2 MIXED HYPERLIPIDEMIA: ICD-10-CM

## 2021-12-15 DIAGNOSIS — D49.4 BLADDER TUMOR: ICD-10-CM

## 2021-12-15 PROCEDURE — 93000 ELECTROCARDIOGRAM COMPLETE: CPT | Performed by: INTERNAL MEDICINE

## 2021-12-15 PROCEDURE — 99214 OFFICE O/P EST MOD 30 MIN: CPT | Performed by: INTERNAL MEDICINE

## 2021-12-15 PROCEDURE — 3074F SYST BP LT 130 MM HG: CPT | Performed by: INTERNAL MEDICINE

## 2021-12-15 PROCEDURE — 3078F DIAST BP <80 MM HG: CPT | Performed by: INTERNAL MEDICINE

## 2021-12-15 PROCEDURE — 3008F BODY MASS INDEX DOCD: CPT | Performed by: INTERNAL MEDICINE

## 2021-12-15 NOTE — PROGRESS NOTES
Preoperative History and Physical Internal Medicine    CC: Patient presents with:  Pre-Op: Rikki Carter surgery      Arthfede Acosta is 60year old presenting for a preoperative exam.    This patient is having surgery on date: 1/10/ mL (300 mg total) into the muscle every 14 (fourteen) days. , Disp: 10 mL, Rfl: 1  •  spironolactone 100 MG Oral Tab, Take 1 tablet (100 mg total) by mouth daily. , Disp: 90 tablet, Rfl: 0  •  Losartan Potassium 25 MG Oral Tab, Take 1 tablet (25 mg total) by Mood normal.         Hospital Outpatient Visit on 12/04/2021   Component Date Value   • ISTAT Creatinine 12/04/2021 1.00    • GFR, -American 12/04/2021 95    • GFR, Non- 12/04/2021 83    Office Visit on 11/24/2021   Component Date Va Hudec:   Adrenal Disease  - Unclear history of possible adrenal insufficiency  - He did require steroid therapy for surgery but never started on long term outpatient steroids  - Normal stimulation test 5/2019  - He does need steroid therapy for surgery  -

## 2021-12-15 NOTE — H&P (VIEW-ONLY)
Preoperative History and Physical Internal Medicine    CC: Patient presents with:  Pre-Op: Rikki Jimenez surgery      Holly Reveles is 60year old presenting for a preoperative exam.    This patient is having surgery on date: 1/10/ mL (300 mg total) into the muscle every 14 (fourteen) days. , Disp: 10 mL, Rfl: 1  •  spironolactone 100 MG Oral Tab, Take 1 tablet (100 mg total) by mouth daily. , Disp: 90 tablet, Rfl: 0  •  Losartan Potassium 25 MG Oral Tab, Take 1 tablet (25 mg total) by Mood normal.         Hospital Outpatient Visit on 12/04/2021   Component Date Value   • ISTAT Creatinine 12/04/2021 1.00    • GFR, -American 12/04/2021 95    • GFR, Non- 12/04/2021 83    Office Visit on 11/24/2021   Component Date Va Hudec:   Adrenal Disease  - Unclear history of possible adrenal insufficiency  - He did require steroid therapy for surgery but never started on long term outpatient steroids  - Normal stimulation test 5/2019  - He does need steroid therapy for surgery  -

## 2021-12-16 ENCOUNTER — TELEPHONE (OUTPATIENT)
Dept: OBGYN CLINIC | Facility: CLINIC | Age: 56
End: 2021-12-16

## 2021-12-16 NOTE — TELEPHONE ENCOUNTER
Thursday, December 16, 2021 8:59 AM  Summary of Your Request  Please review the details of your request below and if everything looks correct click CONTINUE    Your case has been Approved.      Provider Name: DR. Candido Serrano Contact: Briscoe Bernheim  Provider Address:

## 2021-12-17 ENCOUNTER — TELEPHONE (OUTPATIENT)
Dept: OBGYN CLINIC | Facility: CLINIC | Age: 56
End: 2021-12-17

## 2021-12-17 DIAGNOSIS — N85.8 UTERINE MASS: Primary | ICD-10-CM

## 2021-12-17 DIAGNOSIS — Z78.9 FEMALE-TO-MALE TRANSGENDER PERSON: ICD-10-CM

## 2021-12-17 DIAGNOSIS — Z87.890 PERSONAL HISTORY OF SEX REASSIGNMENT: ICD-10-CM

## 2021-12-17 NOTE — TELEPHONE ENCOUNTER
I spoke with pt. I let him know we are unable to do both surgeries the same day due to room & equipment issues. Dr. Marco Antonio Seaman said he would only need 3-5 days recovery from the bladder surgery. I let him know I would try to schedule his surgery for either 1/20/22 or 1/27/22 and let him know once scheduled. Verbalized understanding.

## 2021-12-20 ENCOUNTER — PATIENT MESSAGE (OUTPATIENT)
Dept: INTERNAL MEDICINE CLINIC | Facility: CLINIC | Age: 56
End: 2021-12-20

## 2021-12-20 ENCOUNTER — TELEPHONE (OUTPATIENT)
Dept: INTERNAL MEDICINE CLINIC | Facility: CLINIC | Age: 56
End: 2021-12-20

## 2021-12-20 ENCOUNTER — TELEPHONE (OUTPATIENT)
Dept: SURGERY | Facility: CLINIC | Age: 56
End: 2021-12-20

## 2021-12-20 DIAGNOSIS — Z78.9 FEMALE-TO-MALE TRANSGENDER PERSON: Primary | ICD-10-CM

## 2021-12-20 NOTE — TELEPHONE ENCOUNTER
H&P and ekg faxed to Dr. Satish Holguin, Dr. Ernesto Patten, and Dr. Yadira Mejia office   Confirmation received   ekg sent to scan   Copy placed in accordion

## 2021-12-20 NOTE — TELEPHONE ENCOUNTER
I spoke with pt and let him know I have him scheduled for surgery 1/20/22 at 1:30 PM. I told him her would need to have a covid test 72 hours prior to his surgery. Order sent for signature. All info sent to his Mychart per his request. Verbalized understanding.

## 2021-12-20 NOTE — TELEPHONE ENCOUNTER
From: Cruz Vaughan  To: Dayo Ames MD  Sent: 12/20/2021 1:06 PM CST  Subject: Request for a head MRI referral     Dr. Sheffield Ormond,  I am reaching out regarding a concern I Failed to share during our last visit because I was focusing on the coming surge

## 2021-12-20 NOTE — TELEPHONE ENCOUNTER
Future Appointments   Date Time Provider Alexandria Mariluz   12/22/2021  1:00 PM Nirmala Mancia MD [facility] 8 [facility] [place]   1/10/2022  7:00 AM Steven Griffiths MD [facility]   1/26/2022 11:00 AM Steven Griffiths MD [facility]

## 2021-12-20 NOTE — TELEPHONE ENCOUNTER
Calling pt in regards to scheduling surgery. Informed pt that I have 03/09/2022 available at [facility] with Dr. Nanette Nino. Pt verbalized understanding and in agreement with date and location. All questions answered.    Encouraged pt to call or MyChart

## 2021-12-21 ENCOUNTER — TELEPHONE (OUTPATIENT)
Dept: OBGYN CLINIC | Facility: CLINIC | Age: 56
End: 2021-12-21

## 2021-12-21 NOTE — TELEPHONE ENCOUNTER
No Protocol     Requesting: Omeprazole 40mg     LOV:12/15/21   RTC: none noted   Filled: 6/28/21 #90 3 refills   Recent Labs: 10/13/21     Upcoming OV: 12/22/21

## 2021-12-21 NOTE — TELEPHONE ENCOUNTER
I spoke with pt. I let him know we need him to come in and sign the sterilization for his upcoming surgery. He will stop by tomorrow afternoon. Verbalized understanding.

## 2021-12-21 NOTE — TELEPHONE ENCOUNTER
I spoke with pt. He states he got a [employer] message regarding his upcoming surgery date and it says 1/18/22. I told him it is scheduled for 1/20/22 at 1:00. I let him know I would check into it and call him if any changes with the surgery date. Verbalized understanding.

## 2021-12-22 ENCOUNTER — TELEPHONE (OUTPATIENT)
Dept: OBGYN CLINIC | Facility: CLINIC | Age: 56
End: 2021-12-22

## 2021-12-22 ENCOUNTER — TELEMEDICINE (OUTPATIENT)
Dept: INTERNAL MEDICINE CLINIC | Facility: CLINIC | Age: 56
End: 2021-12-22
Payer: MEDICAID

## 2021-12-22 DIAGNOSIS — R51.9 CHRONIC NONINTRACTABLE HEADACHE, UNSPECIFIED HEADACHE TYPE: Primary | ICD-10-CM

## 2021-12-22 DIAGNOSIS — G89.29 CHRONIC NONINTRACTABLE HEADACHE, UNSPECIFIED HEADACHE TYPE: Primary | ICD-10-CM

## 2021-12-22 PROCEDURE — 99213 OFFICE O/P EST LOW 20 MIN: CPT | Performed by: INTERNAL MEDICINE

## 2021-12-22 RX ORDER — OMEPRAZOLE 40 MG/1
40 CAPSULE, DELAYED RELEASE ORAL DAILY
Qty: 90 CAPSULE | Refills: 3 | Status: SHIPPED | OUTPATIENT
Start: 2021-12-22

## 2021-12-22 NOTE — PROGRESS NOTES
This is a telemedicine visit with live, interactive video and audio. Patient understands and accepts financial responsibility for any deductible, co-insurance and/or co-pays associated with this service.     SUBJECTIVE  He has been having headaches that (fourteen) days. 10 mL 1   • spironolactone 100 MG Oral Tab Take 1 tablet (100 mg total) by mouth daily. 90 tablet 0   • Losartan Potassium 25 MG Oral Tab Take 1 tablet (25 mg total) by mouth daily.  90 tablet 3   • Budesonide-Formoterol Fumarate (SYMBICORT

## 2021-12-23 ENCOUNTER — TELEPHONE (OUTPATIENT)
Dept: OBGYN CLINIC | Facility: CLINIC | Age: 56
End: 2021-12-23

## 2022-01-03 ENCOUNTER — HOSPITAL ENCOUNTER (OUTPATIENT)
Dept: MRI IMAGING | Age: 57
Discharge: HOME | End: 2022-01-03
Attending: INTERNAL MEDICINE
Payer: MEDICAID

## 2022-01-03 DIAGNOSIS — R51.9 CHRONIC NONINTRACTABLE HEADACHE, UNSPECIFIED HEADACHE TYPE: ICD-10-CM

## 2022-01-03 DIAGNOSIS — G89.29 CHRONIC NONINTRACTABLE HEADACHE, UNSPECIFIED HEADACHE TYPE: ICD-10-CM

## 2022-01-03 PROCEDURE — 70551 MRI BRAIN STEM W/O DYE: CPT | Performed by: INTERNAL MEDICINE

## 2022-01-03 RX ORDER — SPIRONOLACTONE 100 MG/1
TABLET, FILM COATED ORAL
Qty: 90 TABLET | Refills: 0 | Status: SHIPPED | OUTPATIENT
Start: 2022-01-03 | End: 2022-01-17

## 2022-01-03 NOTE — TELEPHONE ENCOUNTER
LOV 10/21/2021; RTC in 6 months. Pt does not have a follow up appt scheduled at this time. [employer] message sent as a reminder to schedule a follow up appt. Refill orders pending.

## 2022-01-05 ENCOUNTER — LAB ENCOUNTER (OUTPATIENT)
Dept: LAB | Age: 57
End: 2022-01-05
Attending: INTERNAL MEDICINE
Payer: MEDICAID

## 2022-01-05 DIAGNOSIS — D49.4 BLADDER TUMOR: ICD-10-CM

## 2022-01-05 DIAGNOSIS — Z78.9 NORMAL TISSUE: Primary | ICD-10-CM

## 2022-01-05 DIAGNOSIS — N85.8 UTERINE MASS: ICD-10-CM

## 2022-01-05 LAB
ALBUMIN SERPL-MCNC: 4.1 G/DL (ref 3.4–5)
ALBUMIN/GLOB SERPL: 1.2 {RATIO} (ref 1–2)
ALP LIVER SERPL-CCNC: 67 U/L
ALT SERPL-CCNC: 32 U/L
ANION GAP SERPL CALC-SCNC: 5 MMOL/L (ref 0–18)
AST SERPL-CCNC: 18 U/L (ref 15–37)
BASOPHILS # BLD AUTO: 0.07 X10(3) UL (ref 0–0.2)
BASOPHILS NFR BLD AUTO: 0.7 %
BILIRUB SERPL-MCNC: 0.5 MG/DL (ref 0.1–2)
BILIRUB UR QL STRIP.AUTO: NEGATIVE
BUN BLD-MCNC: 18 MG/DL (ref 7–18)
CALCIUM BLD-MCNC: 9.9 MG/DL (ref 8.5–10.1)
CHLORIDE SERPL-SCNC: 104 MMOL/L (ref 98–112)
CLARITY UR REFRACT.AUTO: CLEAR
CO2 SERPL-SCNC: 28 MMOL/L (ref 21–32)
COLOR UR AUTO: YELLOW
CREAT BLD-MCNC: 1.08 MG/DL
EOSINOPHIL # BLD AUTO: 0.72 X10(3) UL (ref 0–0.7)
EOSINOPHIL NFR BLD AUTO: 7.2 %
ERYTHROCYTE [DISTWIDTH] IN BLOOD BY AUTOMATED COUNT: 12.9 %
FASTING STATUS PATIENT QL REPORTED: YES
GLOBULIN PLAS-MCNC: 3.3 G/DL (ref 2.8–4.4)
GLUCOSE BLD-MCNC: 107 MG/DL (ref 70–99)
GLUCOSE UR STRIP.AUTO-MCNC: 50 MG/DL
HCT VFR BLD AUTO: 50.3 %
HGB BLD-MCNC: 16.3 G/DL
IMM GRANULOCYTES # BLD AUTO: 0.04 X10(3) UL (ref 0–1)
IMM GRANULOCYTES NFR BLD: 0.4 %
KETONES UR STRIP.AUTO-MCNC: NEGATIVE MG/DL
LEUKOCYTE ESTERASE UR QL STRIP.AUTO: NEGATIVE
LYMPHOCYTES # BLD AUTO: 2.73 X10(3) UL (ref 1–4)
LYMPHOCYTES NFR BLD AUTO: 27.2 %
MCH RBC QN AUTO: 27.9 PG (ref 26–34)
MCHC RBC AUTO-ENTMCNC: 32.4 G/DL (ref 31–37)
MCV RBC AUTO: 86 FL
MONOCYTES # BLD AUTO: 0.84 X10(3) UL (ref 0.1–1)
MONOCYTES NFR BLD AUTO: 8.4 %
NEUTROPHILS # BLD AUTO: 5.62 X10 (3) UL (ref 1.5–7.7)
NEUTROPHILS # BLD AUTO: 5.62 X10(3) UL (ref 1.5–7.7)
NEUTROPHILS NFR BLD AUTO: 56.1 %
NITRITE UR QL STRIP.AUTO: NEGATIVE
OSMOLALITY SERPL CALC.SUM OF ELEC: 286 MOSM/KG (ref 275–295)
PH UR STRIP.AUTO: 6 [PH] (ref 5–8)
PLATELET # BLD AUTO: 226 10(3)UL (ref 150–450)
POTASSIUM SERPL-SCNC: 4.5 MMOL/L (ref 3.5–5.1)
PROT SERPL-MCNC: 7.4 G/DL (ref 6.4–8.2)
PROT UR STRIP.AUTO-MCNC: NEGATIVE MG/DL
RBC # BLD AUTO: 5.85 X10(6)UL
SODIUM SERPL-SCNC: 137 MMOL/L (ref 136–145)
SP GR UR STRIP.AUTO: 1.01 (ref 1–1.03)
UROBILINOGEN UR STRIP.AUTO-MCNC: <2 MG/DL
WBC # BLD AUTO: 10 X10(3) UL (ref 4–11)

## 2022-01-05 PROCEDURE — 84402 ASSAY OF FREE TESTOSTERONE: CPT

## 2022-01-05 PROCEDURE — 85025 COMPLETE CBC W/AUTO DIFF WBC: CPT | Performed by: INTERNAL MEDICINE

## 2022-01-05 PROCEDURE — 84403 ASSAY OF TOTAL TESTOSTERONE: CPT

## 2022-01-05 PROCEDURE — 36415 COLL VENOUS BLD VENIPUNCTURE: CPT

## 2022-01-05 PROCEDURE — 80053 COMPREHEN METABOLIC PANEL: CPT | Performed by: INTERNAL MEDICINE

## 2022-01-05 PROCEDURE — 81001 URINALYSIS AUTO W/SCOPE: CPT

## 2022-01-07 ENCOUNTER — LAB ENCOUNTER (OUTPATIENT)
Dept: LAB | Age: 57
End: 2022-01-07
Attending: UROLOGY
Payer: MEDICAID

## 2022-01-07 DIAGNOSIS — D49.4 BLADDER TUMOR: ICD-10-CM

## 2022-01-07 NOTE — PROGRESS NOTES
Sandip CHIANG scheduled next week  Future Appointments  1/7/2022   10:15 AM   [facility] COVID RESOURCE         [facility] LAB             [place]  1/10/2022  7:00 AM    Bl[facility] Griffiths [facility]   1/17/2022  12:15 PM   [address] [facility]

## 2022-01-09 ENCOUNTER — ANESTHESIA EVENT (OUTPATIENT)
Dept: SURGERY | Facility: HOSPITAL | Age: 57
End: 2022-01-09
Payer: MEDICAID

## 2022-01-09 LAB — SARS-COV-2 RNA RESP QL NAA+PROBE: NOT DETECTED

## 2022-01-10 ENCOUNTER — TELEPHONE (OUTPATIENT)
Dept: ENDOCRINOLOGY CLINIC | Facility: CLINIC | Age: 57
End: 2022-01-10

## 2022-01-10 ENCOUNTER — ANESTHESIA (OUTPATIENT)
Dept: SURGERY | Facility: HOSPITAL | Age: 57
End: 2022-01-10
Payer: MEDICAID

## 2022-01-10 ENCOUNTER — HOSPITAL ENCOUNTER (OUTPATIENT)
Facility: HOSPITAL | Age: 57
Setting detail: HOSPITAL OUTPATIENT SURGERY
Discharge: HOME | End: 2022-01-10
Attending: UROLOGY | Admitting: UROLOGY
Payer: MEDICAID

## 2022-01-10 ENCOUNTER — APPOINTMENT (OUTPATIENT)
Dept: GENERAL RADIOLOGY | Facility: HOSPITAL | Age: 57
End: 2022-01-10
Attending: UROLOGY
Payer: MEDICAID

## 2022-01-10 VITALS
HEIGHT: 63 IN | BODY MASS INDEX: 29.95 KG/M2 | TEMPERATURE: 98 F | WEIGHT: 169 LBS | RESPIRATION RATE: 18 BRPM | DIASTOLIC BLOOD PRESSURE: 87 MMHG | HEART RATE: 96 BPM | SYSTOLIC BLOOD PRESSURE: 148 MMHG | OXYGEN SATURATION: 99 %

## 2022-01-10 DIAGNOSIS — D49.4 BLADDER TUMOR: Primary | ICD-10-CM

## 2022-01-10 LAB
TESTOSTERONE TOTAL: 160.3 NG/DL
TESTOSTERONE, FREE -MS/MS: 36.7 PG/ML

## 2022-01-10 PROCEDURE — 88307 TISSUE EXAM BY PATHOLOGIST: CPT | Performed by: UROLOGY

## 2022-01-10 PROCEDURE — 0TBB8ZX EXCISION OF BLADDER, VIA NATURAL OR ARTIFICIAL OPENING ENDOSCOPIC, DIAGNOSTIC: ICD-10-PCS | Performed by: UROLOGY

## 2022-01-10 RX ORDER — ACETAMINOPHEN 500 MG
1000 TABLET ORAL ONCE AS NEEDED
Status: COMPLETED | OUTPATIENT
Start: 2022-01-10 | End: 2022-01-10

## 2022-01-10 RX ORDER — HYDROCODONE BITARTRATE AND ACETAMINOPHEN 5; 325 MG/1; MG/1
2 TABLET ORAL AS NEEDED
Status: DISCONTINUED | OUTPATIENT
Start: 2022-01-10 | End: 2022-01-10

## 2022-01-10 RX ORDER — DEXAMETHASONE SODIUM PHOSPHATE 4 MG/ML
VIAL (ML) INJECTION AS NEEDED
Status: DISCONTINUED | OUTPATIENT
Start: 2022-01-10 | End: 2022-01-10 | Stop reason: SURG

## 2022-01-10 RX ORDER — PHENAZOPYRIDINE HYDROCHLORIDE 200 MG/1
200 TABLET, FILM COATED ORAL 3 TIMES DAILY PRN
Qty: 15 TABLET | Refills: 0 | Status: SHIPPED | OUTPATIENT
Start: 2022-01-10 | End: 2022-01-15

## 2022-01-10 RX ORDER — HYDROMORPHONE HYDROCHLORIDE 1 MG/ML
0.4 INJECTION, SOLUTION INTRAMUSCULAR; INTRAVENOUS; SUBCUTANEOUS EVERY 5 MIN PRN
Status: DISCONTINUED | OUTPATIENT
Start: 2022-01-10 | End: 2022-01-10

## 2022-01-10 RX ORDER — SODIUM CHLORIDE, SODIUM LACTATE, POTASSIUM CHLORIDE, CALCIUM CHLORIDE 600; 310; 30; 20 MG/100ML; MG/100ML; MG/100ML; MG/100ML
INJECTION, SOLUTION INTRAVENOUS CONTINUOUS
Status: DISCONTINUED | OUTPATIENT
Start: 2022-01-10 | End: 2022-01-10

## 2022-01-10 RX ORDER — ACETAMINOPHEN 500 MG
1000 TABLET ORAL ONCE
Status: DISCONTINUED | OUTPATIENT
Start: 2022-01-10 | End: 2022-01-10 | Stop reason: HOSPADM

## 2022-01-10 RX ORDER — CEFAZOLIN SODIUM/WATER 2 G/20 ML
SYRINGE (ML) INTRAVENOUS
Status: DISCONTINUED
Start: 2022-01-10 | End: 2022-01-10

## 2022-01-10 RX ORDER — ONDANSETRON 2 MG/ML
INJECTION INTRAMUSCULAR; INTRAVENOUS AS NEEDED
Status: DISCONTINUED | OUTPATIENT
Start: 2022-01-10 | End: 2022-01-10 | Stop reason: SURG

## 2022-01-10 RX ORDER — CEFAZOLIN SODIUM/WATER 2 G/20 ML
2 SYRINGE (ML) INTRAVENOUS ONCE
Status: COMPLETED | OUTPATIENT
Start: 2022-01-10 | End: 2022-01-10

## 2022-01-10 RX ORDER — PHENAZOPYRIDINE HYDROCHLORIDE 200 MG/1
200 TABLET, FILM COATED ORAL ONCE AS NEEDED
Status: COMPLETED | OUTPATIENT
Start: 2022-01-10 | End: 2022-01-10

## 2022-01-10 RX ORDER — ONDANSETRON 2 MG/ML
4 INJECTION INTRAMUSCULAR; INTRAVENOUS AS NEEDED
Status: DISCONTINUED | OUTPATIENT
Start: 2022-01-10 | End: 2022-01-10

## 2022-01-10 RX ORDER — NALOXONE HYDROCHLORIDE 0.4 MG/ML
80 INJECTION, SOLUTION INTRAMUSCULAR; INTRAVENOUS; SUBCUTANEOUS AS NEEDED
Status: DISCONTINUED | OUTPATIENT
Start: 2022-01-10 | End: 2022-01-10

## 2022-01-10 RX ORDER — MIDAZOLAM HYDROCHLORIDE 1 MG/ML
INJECTION INTRAMUSCULAR; INTRAVENOUS AS NEEDED
Status: DISCONTINUED | OUTPATIENT
Start: 2022-01-10 | End: 2022-01-10 | Stop reason: SURG

## 2022-01-10 RX ORDER — HYDROCODONE BITARTRATE AND ACETAMINOPHEN 5; 325 MG/1; MG/1
1 TABLET ORAL AS NEEDED
Status: DISCONTINUED | OUTPATIENT
Start: 2022-01-10 | End: 2022-01-10

## 2022-01-10 RX ORDER — ACETAMINOPHEN 500 MG
1000 TABLET ORAL EVERY 6 HOURS PRN
Status: ON HOLD | COMMUNITY
End: 2022-01-20

## 2022-01-10 RX ADMIN — ONDANSETRON 4 MG: 2 INJECTION INTRAMUSCULAR; INTRAVENOUS at 07:11:00

## 2022-01-10 RX ADMIN — DEXAMETHASONE SODIUM PHOSPHATE 4 MG: 4 MG/ML VIAL (ML) INJECTION at 07:11:00

## 2022-01-10 RX ADMIN — MIDAZOLAM HYDROCHLORIDE 2 MG: 1 INJECTION INTRAMUSCULAR; INTRAVENOUS at 07:02:00

## 2022-01-10 RX ADMIN — SODIUM CHLORIDE, SODIUM LACTATE, POTASSIUM CHLORIDE, CALCIUM CHLORIDE: 600; 310; 30; 20 INJECTION, SOLUTION INTRAVENOUS at 07:01:00

## 2022-01-10 RX ADMIN — CEFAZOLIN SODIUM/WATER 2 G: 2 G/20 ML SYRINGE (ML) INTRAVENOUS at 07:07:00

## 2022-01-10 NOTE — BRIEF OP NOTE
Pre-Operative Diagnosis: Bladder tumor [D49.4]     Post-Operative Diagnosis: Bladder tumor [D49.4]      Procedure Performed:   CYSTOSCOPY, TRANSURETHRAL RESECTION OF BLADDER TUMOR,  POST OPERATIVE INSTILLATION OF GEMCITABINE    Surgeon(s) and Role:     * U

## 2022-01-10 NOTE — ANESTHESIA PREPROCEDURE EVALUATION
PRE-OP EVALUATION    Patient Name: Nidhi Hines    Admit Diagnosis: Bladder tumor [D49.4]    Pre-op Diagnosis: Bladder tumor [D49.4]    CYSTOSCOPY, TRANSURETHRAL RESECTION OF BLADDER TUMOR, BILATERAL RETROGRADE PYELOGRAM, POST OPERATIVE INSTILLATIO mg total) into the muscle every 14 (fourteen) days. , Disp: 10 mL, Rfl: 1, 1/3/2022  Losartan Potassium 25 MG Oral Tab, Take 1 tablet (25 mg total) by mouth daily. , Disp: 90 tablet, Rfl: 3, 1/3/2022  Syringe 22G X 1\" 3 ML Does not apply Misc, Use to inject appliance(s): upper dentures and lower dentures       Pulmonary    Pulmonary exam normal.                 Other findings            ASA: 2   Plan: general  NPO status verified and         Comment: Risks of general anesthesia were reviewed with the patient,

## 2022-01-10 NOTE — TELEPHONE ENCOUNTER
Please call patient - his Testosterone lab results demonstrate an abnormal pattern. When did he take his testosterone dose in comparison to the lab? Did he miss any injections? Thank you.

## 2022-01-10 NOTE — INTERVAL H&P NOTE
Pre-op Diagnosis: Bladder tumor [D49.4]    The above referenced H&P was reviewed by Mohan Ortiz MD on 1/10/2022, the patient was examined and no significant changes have occurred in the patient's condition since the H&P was performed.   I discussed with the

## 2022-01-10 NOTE — OPERATIVE REPORT
Operative Note    Patient Name: Emily Carrillo   DOS: 1/10/2022  : 10/3/1965     Preoperative Diagnosis: Bladder tumor [D49.4]    Postoperative Diagnosis: Bladder tumor [D49.4]    Surgeon(s) and Role:     * Kishore Griffiths MD - Primary     Assistant: was used to inspect the bladder and urethra. The 3cm frondular mass lateral to the left UO was seen. It did not directly involve the UO. The right UO is clear. The rest of the bladder is clear.   A 26-Bulgarian continuous flow resectoscope was inserted with

## 2022-01-10 NOTE — ANESTHESIA POSTPROCEDURE EVALUATION
[address] Patient Status:  Hospital Outpatient Surgery   Age/Gender 60year old adult MRN [id number]   Location [address] [facility] Attending Rodrigo Flores MD   Hosp Day # 0 PCP MD Jackson Meneses Hartmann

## 2022-01-10 NOTE — ANESTHESIA PROCEDURE NOTES
Airway  Date/Time: 1/10/2022 7:05 AM  Urgency: elective      General Information and Staff    Patient location during procedure: OR  Anesthesiologist: Michel Rachel MD  Performed: anesthesiologist     Indications and Patient Condition  Indications for

## 2022-01-12 PROBLEM — C67.2 CANCER OF LATERAL WALL OF URINARY BLADDER (HCC): Status: ACTIVE | Noted: 2021-11-28

## 2022-01-12 NOTE — TELEPHONE ENCOUNTER
Spoke with patient and he states the lab miryam the testosterone when they shouldn't have. Testosterone lab was done 12 days after he took the injection. He is on 200mg testosterone every 14 days and hasn't missed any injections.

## 2022-01-17 ENCOUNTER — LAB ENCOUNTER (OUTPATIENT)
Dept: LAB | Age: 57
End: 2022-01-17
Attending: OBSTETRICS & GYNECOLOGY
Payer: MEDICAID

## 2022-01-17 DIAGNOSIS — Z87.890 PERSONAL HISTORY OF SEX REASSIGNMENT: ICD-10-CM

## 2022-01-17 DIAGNOSIS — Z78.9 FEMALE-TO-MALE TRANSGENDER PERSON: ICD-10-CM

## 2022-01-17 DIAGNOSIS — N85.8 UTERINE MASS: ICD-10-CM

## 2022-01-17 RX ORDER — SPIRONOLACTONE 100 MG/1
TABLET, FILM COATED ORAL
Qty: 90 TABLET | Refills: 0 | Status: ON HOLD | OUTPATIENT
Start: 2022-01-17 | End: 2022-01-20

## 2022-01-18 LAB — SARS-COV-2 RNA RESP QL NAA+PROBE: NOT DETECTED

## 2022-01-19 ENCOUNTER — TELEPHONE (OUTPATIENT)
Dept: OBGYN CLINIC | Facility: CLINIC | Age: 57
End: 2022-01-19

## 2022-01-20 ENCOUNTER — HOSPITAL ENCOUNTER (INPATIENT)
Facility: HOSPITAL | Age: 57
LOS: 2 days | Discharge: HOME | DRG: 742 | End: 2022-01-22
Attending: OBSTETRICS & GYNECOLOGY | Admitting: OBSTETRICS & GYNECOLOGY
Payer: MEDICAID

## 2022-01-20 ENCOUNTER — ANESTHESIA EVENT (OUTPATIENT)
Dept: SURGERY | Facility: HOSPITAL | Age: 57
DRG: 742 | End: 2022-01-20
Payer: MEDICAID

## 2022-01-20 ENCOUNTER — ANESTHESIA (OUTPATIENT)
Dept: SURGERY | Facility: HOSPITAL | Age: 57
DRG: 742 | End: 2022-01-20
Payer: MEDICAID

## 2022-01-20 DIAGNOSIS — N85.8 UTERINE MASS: Primary | ICD-10-CM

## 2022-01-20 DIAGNOSIS — Z78.9 FEMALE-TO-MALE TRANSGENDER PERSON: ICD-10-CM

## 2022-01-20 DIAGNOSIS — Z87.890 PERSONAL HISTORY OF SEX REASSIGNMENT: ICD-10-CM

## 2022-01-20 PROCEDURE — 58150 TOTAL HYSTERECTOMY: CPT | Performed by: STUDENT IN AN ORGANIZED HEALTH CARE EDUCATION/TRAINING PROGRAM

## 2022-01-20 PROCEDURE — 58150 TOTAL HYSTERECTOMY: CPT | Performed by: OBSTETRICS & GYNECOLOGY

## 2022-01-20 PROCEDURE — 0UT70ZZ RESECTION OF BILATERAL FALLOPIAN TUBES, OPEN APPROACH: ICD-10-PCS | Performed by: OBSTETRICS & GYNECOLOGY

## 2022-01-20 PROCEDURE — 3E0T3BZ INTRODUCTION OF ANESTHETIC AGENT INTO PERIPHERAL NERVES AND PLEXI, PERCUTANEOUS APPROACH: ICD-10-PCS | Performed by: ANESTHESIOLOGY

## 2022-01-20 PROCEDURE — 76942 ECHO GUIDE FOR BIOPSY: CPT | Performed by: ANESTHESIOLOGY

## 2022-01-20 PROCEDURE — 0UT20ZZ RESECTION OF BILATERAL OVARIES, OPEN APPROACH: ICD-10-PCS | Performed by: OBSTETRICS & GYNECOLOGY

## 2022-01-20 PROCEDURE — 0UT90ZZ RESECTION OF UTERUS, OPEN APPROACH: ICD-10-PCS | Performed by: OBSTETRICS & GYNECOLOGY

## 2022-01-20 RX ORDER — HYDROMORPHONE HYDROCHLORIDE 1 MG/ML
0.4 INJECTION, SOLUTION INTRAMUSCULAR; INTRAVENOUS; SUBCUTANEOUS EVERY 5 MIN PRN
Status: DISCONTINUED | OUTPATIENT
Start: 2022-01-20 | End: 2022-01-20 | Stop reason: HOSPADM

## 2022-01-20 RX ORDER — HYDROCODONE BITARTRATE AND ACETAMINOPHEN 5; 325 MG/1; MG/1
2 TABLET ORAL AS NEEDED
Status: DISCONTINUED | OUTPATIENT
Start: 2022-01-20 | End: 2022-01-20 | Stop reason: HOSPADM

## 2022-01-20 RX ORDER — SODIUM CHLORIDE, SODIUM LACTATE, POTASSIUM CHLORIDE, CALCIUM CHLORIDE 600; 310; 30; 20 MG/100ML; MG/100ML; MG/100ML; MG/100ML
INJECTION, SOLUTION INTRAVENOUS CONTINUOUS
Status: DISCONTINUED | OUTPATIENT
Start: 2022-01-20 | End: 2022-01-20 | Stop reason: HOSPADM

## 2022-01-20 RX ORDER — ONDANSETRON 2 MG/ML
4 INJECTION INTRAMUSCULAR; INTRAVENOUS EVERY 8 HOURS PRN
Status: DISCONTINUED | OUTPATIENT
Start: 2022-01-20 | End: 2022-01-22

## 2022-01-20 RX ORDER — ENOXAPARIN SODIUM 100 MG/ML
40 INJECTION SUBCUTANEOUS DAILY
Status: DISCONTINUED | OUTPATIENT
Start: 2022-01-21 | End: 2022-01-22

## 2022-01-20 RX ORDER — HYDROCORTISONE 20 MG/1
20 TABLET ORAL 2 TIMES DAILY
Status: DISCONTINUED | OUTPATIENT
Start: 2022-01-20 | End: 2022-01-20

## 2022-01-20 RX ORDER — ONDANSETRON 2 MG/ML
4 INJECTION INTRAMUSCULAR; INTRAVENOUS AS NEEDED
Status: DISCONTINUED | OUTPATIENT
Start: 2022-01-20 | End: 2022-01-20 | Stop reason: HOSPADM

## 2022-01-20 RX ORDER — ROSUVASTATIN CALCIUM 20 MG/1
20 TABLET, COATED ORAL EVERY MORNING
COMMUNITY

## 2022-01-20 RX ORDER — ACETAMINOPHEN 10 MG/ML
INJECTION, SOLUTION INTRAVENOUS
Status: COMPLETED
Start: 2022-01-20 | End: 2022-01-20

## 2022-01-20 RX ORDER — ACETAMINOPHEN 10 MG/ML
1000 INJECTION, SOLUTION INTRAVENOUS ONCE
Status: COMPLETED | OUTPATIENT
Start: 2022-01-20 | End: 2022-01-20

## 2022-01-20 RX ORDER — ACETAMINOPHEN 500 MG
1000 TABLET ORAL ONCE
Status: DISCONTINUED | OUTPATIENT
Start: 2022-01-20 | End: 2022-01-20 | Stop reason: HOSPADM

## 2022-01-20 RX ORDER — HYDROMORPHONE HYDROCHLORIDE 1 MG/ML
2 INJECTION, SOLUTION INTRAMUSCULAR; INTRAVENOUS; SUBCUTANEOUS EVERY 2 HOUR PRN
Status: DISCONTINUED | OUTPATIENT
Start: 2022-01-20 | End: 2022-01-22

## 2022-01-20 RX ORDER — SPIRONOLACTONE 100 MG/1
100 TABLET, FILM COATED ORAL DAILY
COMMUNITY

## 2022-01-20 RX ORDER — KETOROLAC TROMETHAMINE 30 MG/ML
30 INJECTION, SOLUTION INTRAMUSCULAR; INTRAVENOUS EVERY 6 HOURS
Status: DISCONTINUED | OUTPATIENT
Start: 2022-01-20 | End: 2022-01-20 | Stop reason: HOSPADM

## 2022-01-20 RX ORDER — ONDANSETRON 2 MG/ML
INJECTION INTRAMUSCULAR; INTRAVENOUS AS NEEDED
Status: DISCONTINUED | OUTPATIENT
Start: 2022-01-20 | End: 2022-01-20 | Stop reason: SURG

## 2022-01-20 RX ORDER — HYDROMORPHONE HYDROCHLORIDE 1 MG/ML
INJECTION, SOLUTION INTRAMUSCULAR; INTRAVENOUS; SUBCUTANEOUS
Status: COMPLETED
Start: 2022-01-20 | End: 2022-01-20

## 2022-01-20 RX ORDER — CEFAZOLIN SODIUM/WATER 2 G/20 ML
2 SYRINGE (ML) INTRAVENOUS EVERY 8 HOURS
Status: COMPLETED | OUTPATIENT
Start: 2022-01-20 | End: 2022-01-21

## 2022-01-20 RX ORDER — MEPERIDINE HYDROCHLORIDE 25 MG/ML
12.5 INJECTION INTRAMUSCULAR; INTRAVENOUS; SUBCUTANEOUS AS NEEDED
Status: DISCONTINUED | OUTPATIENT
Start: 2022-01-20 | End: 2022-01-20 | Stop reason: HOSPADM

## 2022-01-20 RX ORDER — MIDAZOLAM HYDROCHLORIDE 1 MG/ML
INJECTION INTRAMUSCULAR; INTRAVENOUS AS NEEDED
Status: DISCONTINUED | OUTPATIENT
Start: 2022-01-20 | End: 2022-01-20 | Stop reason: SURG

## 2022-01-20 RX ORDER — METOCLOPRAMIDE HYDROCHLORIDE 5 MG/ML
INJECTION INTRAMUSCULAR; INTRAVENOUS AS NEEDED
Status: DISCONTINUED | OUTPATIENT
Start: 2022-01-20 | End: 2022-01-20 | Stop reason: SURG

## 2022-01-20 RX ORDER — ACETAMINOPHEN 325 MG/1
650 TABLET ORAL EVERY 6 HOURS
Status: DISCONTINUED | OUTPATIENT
Start: 2022-01-20 | End: 2022-01-22

## 2022-01-20 RX ORDER — LABETALOL HYDROCHLORIDE 5 MG/ML
5 INJECTION, SOLUTION INTRAVENOUS EVERY 5 MIN PRN
Status: DISCONTINUED | OUTPATIENT
Start: 2022-01-20 | End: 2022-01-20 | Stop reason: HOSPADM

## 2022-01-20 RX ORDER — NALOXONE HYDROCHLORIDE 0.4 MG/ML
80 INJECTION, SOLUTION INTRAMUSCULAR; INTRAVENOUS; SUBCUTANEOUS AS NEEDED
Status: DISCONTINUED | OUTPATIENT
Start: 2022-01-20 | End: 2022-01-20 | Stop reason: HOSPADM

## 2022-01-20 RX ORDER — HYDROMORPHONE HYDROCHLORIDE 1 MG/ML
0.5 INJECTION, SOLUTION INTRAMUSCULAR; INTRAVENOUS; SUBCUTANEOUS EVERY 2 HOUR PRN
Status: DISCONTINUED | OUTPATIENT
Start: 2022-01-20 | End: 2022-01-22

## 2022-01-20 RX ORDER — LOSARTAN POTASSIUM 25 MG/1
25 TABLET ORAL DAILY
Status: DISCONTINUED | OUTPATIENT
Start: 2022-01-20 | End: 2022-01-22

## 2022-01-20 RX ORDER — SODIUM CHLORIDE, SODIUM LACTATE, POTASSIUM CHLORIDE, CALCIUM CHLORIDE 600; 310; 30; 20 MG/100ML; MG/100ML; MG/100ML; MG/100ML
INJECTION, SOLUTION INTRAVENOUS CONTINUOUS
Status: DISCONTINUED | OUTPATIENT
Start: 2022-01-20 | End: 2022-01-20

## 2022-01-20 RX ORDER — MEPERIDINE HYDROCHLORIDE 25 MG/ML
INJECTION INTRAMUSCULAR; INTRAVENOUS; SUBCUTANEOUS
Status: COMPLETED
Start: 2022-01-20 | End: 2022-01-20

## 2022-01-20 RX ORDER — NEOSTIGMINE METHYLSULFATE 1 MG/ML
INJECTION INTRAVENOUS AS NEEDED
Status: DISCONTINUED | OUTPATIENT
Start: 2022-01-20 | End: 2022-01-20 | Stop reason: SURG

## 2022-01-20 RX ORDER — ROCURONIUM BROMIDE 10 MG/ML
INJECTION, SOLUTION INTRAVENOUS AS NEEDED
Status: DISCONTINUED | OUTPATIENT
Start: 2022-01-20 | End: 2022-01-20 | Stop reason: SURG

## 2022-01-20 RX ORDER — DEXAMETHASONE SODIUM PHOSPHATE 10 MG/ML
INJECTION, SOLUTION INTRAMUSCULAR; INTRAVENOUS AS NEEDED
Status: DISCONTINUED | OUTPATIENT
Start: 2022-01-20 | End: 2022-01-20 | Stop reason: SURG

## 2022-01-20 RX ORDER — HYDROMORPHONE HYDROCHLORIDE 1 MG/ML
1 INJECTION, SOLUTION INTRAMUSCULAR; INTRAVENOUS; SUBCUTANEOUS EVERY 2 HOUR PRN
Status: DISCONTINUED | OUTPATIENT
Start: 2022-01-20 | End: 2022-01-22

## 2022-01-20 RX ORDER — GLYCOPYRROLATE 0.2 MG/ML
INJECTION, SOLUTION INTRAMUSCULAR; INTRAVENOUS AS NEEDED
Status: DISCONTINUED | OUTPATIENT
Start: 2022-01-20 | End: 2022-01-20 | Stop reason: SURG

## 2022-01-20 RX ORDER — DEXTROSE, SODIUM CHLORIDE, SODIUM LACTATE, POTASSIUM CHLORIDE, AND CALCIUM CHLORIDE 5; .6; .31; .03; .02 G/100ML; G/100ML; G/100ML; G/100ML; G/100ML
INJECTION, SOLUTION INTRAVENOUS CONTINUOUS
Status: DISCONTINUED | OUTPATIENT
Start: 2022-01-20 | End: 2022-01-21

## 2022-01-20 RX ORDER — ROPIVACAINE HYDROCHLORIDE 5 MG/ML
INJECTION, SOLUTION EPIDURAL; INFILTRATION; PERINEURAL AS NEEDED
Status: DISCONTINUED | OUTPATIENT
Start: 2022-01-20 | End: 2022-01-20 | Stop reason: SURG

## 2022-01-20 RX ORDER — DEXAMETHASONE SODIUM PHOSPHATE 4 MG/ML
VIAL (ML) INJECTION AS NEEDED
Status: DISCONTINUED | OUTPATIENT
Start: 2022-01-20 | End: 2022-01-20 | Stop reason: SURG

## 2022-01-20 RX ORDER — HYDROCODONE BITARTRATE AND ACETAMINOPHEN 5; 325 MG/1; MG/1
1 TABLET ORAL AS NEEDED
Status: DISCONTINUED | OUTPATIENT
Start: 2022-01-20 | End: 2022-01-20 | Stop reason: HOSPADM

## 2022-01-20 RX ORDER — ACETAMINOPHEN 500 MG
1000 TABLET ORAL ONCE AS NEEDED
Status: DISCONTINUED | OUTPATIENT
Start: 2022-01-20 | End: 2022-01-20 | Stop reason: HOSPADM

## 2022-01-20 RX ORDER — CEFAZOLIN SODIUM/WATER 2 G/20 ML
2 SYRINGE (ML) INTRAVENOUS ONCE
Status: COMPLETED | OUTPATIENT
Start: 2022-01-20 | End: 2022-01-20

## 2022-01-20 RX ORDER — ONDANSETRON 4 MG/1
4 TABLET, FILM COATED ORAL EVERY 8 HOURS PRN
Status: DISCONTINUED | OUTPATIENT
Start: 2022-01-20 | End: 2022-01-22

## 2022-01-20 RX ORDER — KETOROLAC TROMETHAMINE 30 MG/ML
INJECTION, SOLUTION INTRAMUSCULAR; INTRAVENOUS AS NEEDED
Status: DISCONTINUED | OUTPATIENT
Start: 2022-01-20 | End: 2022-01-20 | Stop reason: SURG

## 2022-01-20 RX ORDER — MIDAZOLAM HYDROCHLORIDE 1 MG/ML
1 INJECTION INTRAMUSCULAR; INTRAVENOUS EVERY 5 MIN PRN
Status: DISCONTINUED | OUTPATIENT
Start: 2022-01-20 | End: 2022-01-20 | Stop reason: HOSPADM

## 2022-01-20 RX ADMIN — KETOROLAC TROMETHAMINE 30 MG: 30 INJECTION, SOLUTION INTRAMUSCULAR; INTRAVENOUS at 15:57:00

## 2022-01-20 RX ADMIN — DEXAMETHASONE SODIUM PHOSPHATE 8 MG: 4 MG/ML VIAL (ML) INJECTION at 14:10:00

## 2022-01-20 RX ADMIN — SODIUM CHLORIDE, SODIUM LACTATE, POTASSIUM CHLORIDE, CALCIUM CHLORIDE: 600; 310; 30; 20 INJECTION, SOLUTION INTRAVENOUS at 15:06:00

## 2022-01-20 RX ADMIN — ONDANSETRON 4 MG: 2 INJECTION INTRAMUSCULAR; INTRAVENOUS at 15:57:00

## 2022-01-20 RX ADMIN — SODIUM CHLORIDE, SODIUM LACTATE, POTASSIUM CHLORIDE, CALCIUM CHLORIDE: 600; 310; 30; 20 INJECTION, SOLUTION INTRAVENOUS at 13:56:00

## 2022-01-20 RX ADMIN — METOCLOPRAMIDE HYDROCHLORIDE 10 MG: 5 INJECTION INTRAMUSCULAR; INTRAVENOUS at 14:10:00

## 2022-01-20 RX ADMIN — CEFAZOLIN SODIUM/WATER 2 G: 2 G/20 ML SYRINGE (ML) INTRAVENOUS at 14:10:00

## 2022-01-20 RX ADMIN — ROCURONIUM BROMIDE 70 MG: 10 INJECTION, SOLUTION INTRAVENOUS at 14:00:00

## 2022-01-20 RX ADMIN — MIDAZOLAM HYDROCHLORIDE 2 MG: 1 INJECTION INTRAMUSCULAR; INTRAVENOUS at 14:00:00

## 2022-01-20 RX ADMIN — GLYCOPYRROLATE 0.6 MG: 0.2 INJECTION, SOLUTION INTRAMUSCULAR; INTRAVENOUS at 16:47:00

## 2022-01-20 RX ADMIN — NEOSTIGMINE METHYLSULFATE 5 MG: 1 INJECTION INTRAVENOUS at 16:47:00

## 2022-01-20 RX ADMIN — DEXAMETHASONE SODIUM PHOSPHATE 4 MG: 10 INJECTION, SOLUTION INTRAMUSCULAR; INTRAVENOUS at 16:38:00

## 2022-01-20 RX ADMIN — SODIUM CHLORIDE, SODIUM LACTATE, POTASSIUM CHLORIDE, CALCIUM CHLORIDE: 600; 310; 30; 20 INJECTION, SOLUTION INTRAVENOUS at 17:04:00

## 2022-01-20 RX ADMIN — ROPIVACAINE HYDROCHLORIDE 60 ML: 5 INJECTION, SOLUTION EPIDURAL; INFILTRATION; PERINEURAL at 16:38:00

## 2022-01-20 NOTE — H&P
History & Physical Examination    Patient Name: Roman Meneses  MRN: [id number]  [facility]: [id number]  YOB: 1965    Diagnosis: endometrial mass, female to male transgender    Present Illness: Patient recently diagnosed with bladder cancer and Cancer of lateral wall of urinary bladder ([facility] [address] 11/28/2021   • High blood pressure    • Visual impairment     glasses     Past Surgical History:   Procedure Laterality Date   • CHOLECYSTECTOMY  2008    [address] [place] [address]   • CYSTOURETHROSCOPY  11/24/2021    Cysto

## 2022-01-20 NOTE — ANESTHESIA POSTPROCEDURE EVALUATION
[address] [facility] Patient Status:  Inpatient   Age/Gender 60year old adult MRN [id number]   [facility] SURGERY Attending Leyla Navarro, [address] Day # 0 PCP Namrata Bridges MD       Anesthesia Post-op Note    TOTAL ABDOMINA

## 2022-01-20 NOTE — ANESTHESIA PREPROCEDURE EVALUATION
PRE-OP EVALUATION    Patient Name: Lali Roberts    Admit Diagnosis: Uterine mass [N85.8]  Personal history of sex reassignment [Z80.200]  Female-to-male transgender person [Z78.9]    Pre-op Diagnosis: Uterine mass [N85.8]  Personal history of sex r cypionate 200 MG/ML Intramuscular Solution, Inject 1.5 mL (300 mg total) into the muscle every 14 (fourteen) days. , Disp: 10 mL, Rfl: 1, 1/15/2022  Budesonide-Formoterol Fumarate (SYMBICORT) 160-4.5 MCG/ACT Inhalation Aerosol, Inhale 2 puffs into the lungs Lab Results   Component Value Date     01/05/2022    K 4.5 01/05/2022     01/05/2022    CO2 28.0 01/05/2022    BUN 18 01/05/2022    CREATSERUM 1.08 01/05/2022     (H) 01/05/2022    CA 9.9 01/05/2022            Airway      Mallampati:

## 2022-01-20 NOTE — ANESTHESIA PROCEDURE NOTES
Airway  Date/Time: 1/20/2022 2:04 PM  Urgency: elective      General Information and Staff    Patient location during procedure: OR  Anesthesiologist: Gustavo Menjivar MD  Performed: anesthesiologist     Indications and Patient Condition  Indications for airw

## 2022-01-20 NOTE — ANESTHESIA PROCEDURE NOTES
Regional Block  Performed by: Parish Avalos MD  Authorized by: Parish Avalos MD       General Information and Staff    Start Time:  1/20/2022 4:38 PM  End Time:  1/20/2022 4:38 PM  Anesthesiologist:  Parish Avalos MD  Performed by:   Anesthesiologist  Matthew

## 2022-01-21 LAB
BASOPHILS # BLD AUTO: 0.02 X10(3) UL (ref 0–0.2)
BASOPHILS NFR BLD AUTO: 0.1 %
EOSINOPHIL # BLD AUTO: 0 X10(3) UL (ref 0–0.7)
EOSINOPHIL NFR BLD AUTO: 0 %
ERYTHROCYTE [DISTWIDTH] IN BLOOD BY AUTOMATED COUNT: 14 %
HCT VFR BLD AUTO: 42.2 %
HGB BLD-MCNC: 13.8 G/DL
IMM GRANULOCYTES # BLD AUTO: 0.15 X10(3) UL (ref 0–1)
IMM GRANULOCYTES NFR BLD: 0.8 %
LYMPHOCYTES # BLD AUTO: 1.19 X10(3) UL (ref 1–4)
LYMPHOCYTES NFR BLD AUTO: 6.5 %
MCH RBC QN AUTO: 27.9 PG (ref 26–34)
MCHC RBC AUTO-ENTMCNC: 32.7 G/DL (ref 31–37)
MCV RBC AUTO: 85.3 FL
MONOCYTES # BLD AUTO: 1.19 X10(3) UL (ref 0.1–1)
MONOCYTES NFR BLD AUTO: 6.5 %
NEUTROPHILS # BLD AUTO: 15.76 X10 (3) UL (ref 1.5–7.7)
NEUTROPHILS # BLD AUTO: 15.76 X10(3) UL (ref 1.5–7.7)
NEUTROPHILS NFR BLD AUTO: 86.1 %
PLATELET # BLD AUTO: 220 10(3)UL (ref 150–450)
RBC # BLD AUTO: 4.95 X10(6)UL
WBC # BLD AUTO: 18.3 X10(3) UL (ref 4–11)

## 2022-01-21 RX ORDER — SIMETHICONE 80 MG
160 TABLET,CHEWABLE ORAL 3 TIMES DAILY PRN
Status: DISCONTINUED | OUTPATIENT
Start: 2022-01-21 | End: 2022-01-22

## 2022-01-21 RX ORDER — OXYCODONE HYDROCHLORIDE 15 MG/1
15 TABLET ORAL EVERY 4 HOURS PRN
Status: DISCONTINUED | OUTPATIENT
Start: 2022-01-21 | End: 2022-01-22

## 2022-01-21 RX ORDER — SIMETHICONE 80 MG
80 TABLET,CHEWABLE ORAL 3 TIMES DAILY PRN
Status: DISCONTINUED | OUTPATIENT
Start: 2022-01-21 | End: 2022-01-22

## 2022-01-21 RX ORDER — ROSUVASTATIN CALCIUM 20 MG/1
20 TABLET, COATED ORAL EVERY MORNING
Status: DISCONTINUED | OUTPATIENT
Start: 2022-01-21 | End: 2022-01-22

## 2022-01-21 RX ORDER — OXYCODONE HYDROCHLORIDE 10 MG/1
10 TABLET ORAL EVERY 4 HOURS PRN
Status: DISCONTINUED | OUTPATIENT
Start: 2022-01-21 | End: 2022-01-22

## 2022-01-21 RX ORDER — HYDROCORTISONE 20 MG/1
20 TABLET ORAL 2 TIMES DAILY
Status: DISCONTINUED | OUTPATIENT
Start: 2022-01-21 | End: 2022-01-22

## 2022-01-21 RX ORDER — OXYCODONE HYDROCHLORIDE 5 MG/1
5 TABLET ORAL EVERY 4 HOURS PRN
Status: DISCONTINUED | OUTPATIENT
Start: 2022-01-21 | End: 2022-01-22

## 2022-01-21 RX ORDER — PANTOPRAZOLE SODIUM 40 MG/1
40 TABLET, DELAYED RELEASE ORAL
Refills: 3 | Status: DISCONTINUED | OUTPATIENT
Start: 2022-01-21 | End: 2022-01-22

## 2022-01-21 NOTE — PLAN OF CARE
Received patient via bed from PACU. Alert and oriented x4. IVF changed and maintained. ABX given. Lower abdomen incision dressing c/d/I with abdominal binder in place. Hart catheter with clear yellow urine output. Pain controlled with Dilaudid.  Clear liqu Assess pt frequently for physical needs  - Identify cognitive and physical deficits and behaviors that affect risk of falls.   - Conway fall precautions as indicated by assessment.  - Educate pt/family on patient safety including physical limitations  -

## 2022-01-21 NOTE — CONSULTS
[facility] Hospitalist H&P       CC: No chief complaint on file.        PCP: Lucy Brock MD    History of Present Illness:    Patient is a 49-year-old transgender(female to male)-, perioperative insufficiency, recent transurethral section of bladder tumor, his Take 1 capsule (40 mg total) by mouth daily. , Disp: 90 capsule, Rfl: 3  hydrocortisone 20 MG Oral Tab, Take 1 tablet (20 mg total) by mouth 2 (two) times daily. , Disp: 20 tablet, Rfl: 1  Losartan Potassium 25 MG Oral Tab, Take 1 tablet (25 mg total) by nancy : 170 lb (77.1 kg)  10/19/21 : 174 lb 3.2 oz (79 kg)    Gen: No acute distress, alert and oriented x3   pulm: Lungs clear bilaterally, good inspiratory effort   CV:  nL S1/I4mzgopba rate and rhythm  Abd: soft, NT/ND, no hepatomegaly, +BS abdominal binder i diffusion to suggest acute ischemia/infarction. The visualized paranasal sinuses and mastoid air cells are unremarkable. The expected major intracranial flow voids are present. CONCLUSION:   1. No evidence of an acute infarct.   2. Scattered m

## 2022-01-21 NOTE — PROGRESS NOTES
[facility] Gender Male or Female  Documentation      Lizandro Wilkinson is a 60year old patient   Legal Sex: male  Gender Identity: transgender male / female-to-male  Sex at Birth: female  Patient is on hormone replacement therapy.   Testosterone cypionate Soln -

## 2022-01-21 NOTE — PROGRESS NOTES
GYN progress note    A/P: 60year old No obstetric history on file. now POD#1 s/p GEN/BSO for endometrial mass. Patient is transgender female to male on testosterone therapy.  History of bladder cancer s/p resection 1/10/2022,  hypertension, adrenal insuffi CTAB  Abd soft, moderately distended, diffusely tympanitic, hypoactive BS.    Incision: dressing clean/dry/intact - will be 24 hr from completion of procedure 1700  Ext nontender, no edema    Recent Labs   Lab 01/21/22  0535   WBC 18.3*   HGB 13.8

## 2022-01-21 NOTE — PLAN OF CARE
Low abdominal dressing abd pad clean, dry, intact. Abdominal binder in place. Abdomen slight distended. Denies nausea, vomiting. States tolerating diet well.  02 sat 95% on 02 2lnc.   Encouraged activity & incentive spirometer and cough deep breathing exe

## 2022-01-21 NOTE — OPERATIVE REPORT
Operative Note    Preop diagnosis: Endometrial mass, transgender female to male, history of endometriosis    Postop diagnosis: Same, pelvic adhesions    Procedure:  TAHBSO, lysis of adhesions    Surgeon:  Blanche Jo    Assistant:  Robert Ponce  Anesthesia:  General doubly tied with vicryl suture. The  right ureter was identified and palpated.  The posterior leaf of the broad ligament was bluntly perforated on the right side and the right infundibular pelvic ligament was doubly clampled, cut and doubly tied with vicryl in the following manner. The peritoneum was closed in a running fashion. The fasica was closed with a running vicryl suture . The subcutaneous tissue was brought together with 3 plain interrupted sutures. The skin was closed with 4-0 Monocryl.  Jane bedoya

## 2022-01-22 VITALS
TEMPERATURE: 98 F | RESPIRATION RATE: 18 BRPM | HEIGHT: 63 IN | DIASTOLIC BLOOD PRESSURE: 77 MMHG | BODY MASS INDEX: 29.8 KG/M2 | HEART RATE: 87 BPM | OXYGEN SATURATION: 94 % | WEIGHT: 168.19 LBS | SYSTOLIC BLOOD PRESSURE: 127 MMHG

## 2022-01-22 RX ORDER — ACETAMINOPHEN 500 MG
1000 TABLET ORAL EVERY 6 HOURS
Qty: 40 TABLET | Refills: 0 | Status: SHIPPED | OUTPATIENT
Start: 2022-01-22

## 2022-01-22 RX ORDER — OXYCODONE HYDROCHLORIDE AND ACETAMINOPHEN 5; 325 MG/1; MG/1
1 TABLET ORAL EVERY 4 HOURS PRN
Qty: 10 TABLET | Refills: 0 | Status: SHIPPED | OUTPATIENT
Start: 2022-01-22 | End: 2022-03-16

## 2022-01-22 RX ORDER — IBUPROFEN 400 MG/1
400 TABLET ORAL EVERY 6 HOURS PRN
Status: DISCONTINUED | OUTPATIENT
Start: 2022-01-22 | End: 2022-01-22

## 2022-01-22 RX ORDER — IBUPROFEN 400 MG/1
400 TABLET ORAL EVERY 6 HOURS PRN
Qty: 40 TABLET | Refills: 1 | Status: SHIPPED | OUTPATIENT
Start: 2022-01-22

## 2022-01-22 NOTE — DISCHARGE SUMMARY
General Medicine Discharge Summary     Patient ID:  Lali Roberts  60year old  10/3/1965    Admit date: 1/20/2022    Discharge date and time:1/22/2022    Attending Physician: Marian López MD     Primary Care Physician: Gina Jauregui MD     Reason f monitor     #Hyperlipidemia statin     #Recent bladder tumor status post transurethral resection of bladder tumor  #Hypertension  -Losartan pretty well controlled holding Aldactone at the moment  -Resume  Aldactone at discharge     #GERD  -Protoni      Con opportunity to ask questions and state understand and agree with therapeutic plan as outlined above.      Thank Anamika Berg MD

## 2022-01-22 NOTE — PLAN OF CARE
Pt seen by gyne and hospitalist this am.  OK for dc to home today. Up with supervision in room. Denies any vaginal bleeding. Pain controlled with ordered meds. Dressing and abdo binder dry and intact. Bowel sounds present with soft, rounded abdo.   Randy León

## 2022-01-22 NOTE — PLAN OF CARE
Pt given pain meds & simethicone to help with Lower abdm pain & gas pain, see mar. Pt walking with staff in [place] this evening, voiding in bathroom. On ra,  & scds in place. Tolerating soft diet. Abd binder to abdm clean dry and intact. Vss at this time.

## 2022-01-22 NOTE — PROGRESS NOTES
POD#2  patient takes tylenol for pain, declined oxycodone, had one dose of dilaudid last night.  Discussed alternating tylenol and ibuprofen every 3 hours, passing gas    /58 (BP Location: Right arm)   Pulse 79   Temp 99.2 °F (37.3 °C) (Oral)   Resp 1

## 2022-01-26 NOTE — PROGRESS NOTES
I spoke with pt and let him know the pathology was benign. She is feeling pretty good. He is going to drop off family leave form to be filled out. He will drop them off tomorrow. Verbalized understanding.

## 2022-01-28 ENCOUNTER — TELEPHONE (OUTPATIENT)
Dept: OBGYN CLINIC | Facility: CLINIC | Age: 57
End: 2022-01-28

## 2022-02-04 ENCOUNTER — OFFICE VISIT (OUTPATIENT)
Dept: OBGYN CLINIC | Facility: CLINIC | Age: 57
End: 2022-02-04
Payer: MEDICAID

## 2022-02-04 VITALS
BODY MASS INDEX: 29.95 KG/M2 | HEIGHT: 63 IN | SYSTOLIC BLOOD PRESSURE: 135 MMHG | WEIGHT: 169 LBS | HEART RATE: 92 BPM | DIASTOLIC BLOOD PRESSURE: 64 MMHG

## 2022-02-04 DIAGNOSIS — Z90.710 STATUS POST HYSTERECTOMY WITH OOPHORECTOMY: Primary | ICD-10-CM

## 2022-02-04 DIAGNOSIS — Z90.721 STATUS POST HYSTERECTOMY WITH OOPHORECTOMY: Primary | ICD-10-CM

## 2022-02-04 PROCEDURE — 3008F BODY MASS INDEX DOCD: CPT | Performed by: OBSTETRICS & GYNECOLOGY

## 2022-02-04 PROCEDURE — 3078F DIAST BP <80 MM HG: CPT | Performed by: OBSTETRICS & GYNECOLOGY

## 2022-02-04 PROCEDURE — 99024 POSTOP FOLLOW-UP VISIT: CPT | Performed by: OBSTETRICS & GYNECOLOGY

## 2022-02-04 PROCEDURE — 3075F SYST BP GE 130 - 139MM HG: CPT | Performed by: OBSTETRICS & GYNECOLOGY

## 2022-02-04 NOTE — PROGRESS NOTES
Patient is status post GEN/BSO. Doing well. Still having some incisional pain. Steri-Strips removed. Pain is controlled with ibuprofen. Patient denies nausea vomiting. Voiding well normal bowel movements.   Follow-up in 4 weeks

## 2022-02-23 ENCOUNTER — TELEPHONE (OUTPATIENT)
Dept: SURGERY | Facility: CLINIC | Age: 57
End: 2022-02-23

## 2022-02-23 NOTE — TELEPHONE ENCOUNTER
Received a vm from pre admission testing stating pt's policy is showing as inactive, pt has a procedure coming up on 3/9/22. Called patient and asked if there is a new insurance, however pt stated he is working with [employer] as he states it is an error on their end. Pt stated he will call me back once it has been corrected. If I do not receive a call by 3/1 and pt's policy is showing as inactive in March we may need to move pt's procedure.

## 2022-03-04 ENCOUNTER — TELEPHONE (OUTPATIENT)
Dept: SURGERY | Facility: CLINIC | Age: 57
End: 2022-03-04

## 2022-03-04 NOTE — TELEPHONE ENCOUNTER
Calling pt in regards to scheduling surgery. Informed pt that I have 04/27/2022 available at [facility] with Dr. Nicky Colon. Pt verbalized understanding and in agreement with date and location. All questions answered. Encouraged pt to call or [employer] message office with any other questions or concerns.

## 2022-03-16 ENCOUNTER — OFFICE VISIT (OUTPATIENT)
Dept: OBGYN CLINIC | Facility: CLINIC | Age: 57
End: 2022-03-16
Payer: MEDICAID

## 2022-03-16 VITALS
HEIGHT: 63 IN | HEART RATE: 86 BPM | SYSTOLIC BLOOD PRESSURE: 148 MMHG | DIASTOLIC BLOOD PRESSURE: 73 MMHG | BODY MASS INDEX: 30.48 KG/M2 | WEIGHT: 172 LBS

## 2022-03-16 DIAGNOSIS — Z90.710 STATUS POST HYSTERECTOMY: Primary | ICD-10-CM

## 2022-03-16 NOTE — PROGRESS NOTES
Patient is here for postop exam.  Status post GEN/BSO. Doing well. Ambulating. Tolerating general diet normal bowel movements. Plans on going back to work next week. Needs note.   ABD: Incision dry and intact healing well  Follow-up as needed

## 2022-03-18 ENCOUNTER — TELEPHONE (OUTPATIENT)
Dept: INTERNAL MEDICINE CLINIC | Facility: CLINIC | Age: 57
End: 2022-03-18

## 2022-03-18 DIAGNOSIS — Z12.11 COLON CANCER SCREENING: Primary | ICD-10-CM

## 2022-04-20 ENCOUNTER — OFFICE VISIT (OUTPATIENT)
Dept: INTERNAL MEDICINE CLINIC | Facility: CLINIC | Age: 57
End: 2022-04-20
Payer: MEDICAID

## 2022-04-20 ENCOUNTER — LAB ENCOUNTER (OUTPATIENT)
Dept: LAB | Age: 57
End: 2022-04-20
Attending: INTERNAL MEDICINE
Payer: MEDICAID

## 2022-04-20 VITALS
WEIGHT: 170.19 LBS | HEIGHT: 63 IN | TEMPERATURE: 98 F | BODY MASS INDEX: 30.16 KG/M2 | DIASTOLIC BLOOD PRESSURE: 76 MMHG | OXYGEN SATURATION: 99 % | SYSTOLIC BLOOD PRESSURE: 130 MMHG | HEART RATE: 78 BPM | RESPIRATION RATE: 16 BRPM

## 2022-04-20 DIAGNOSIS — E78.2 MIXED HYPERLIPIDEMIA: ICD-10-CM

## 2022-04-20 DIAGNOSIS — R73.03 PRE-DIABETES: ICD-10-CM

## 2022-04-20 DIAGNOSIS — Z78.9 FEMALE-TO-MALE TRANSGENDER PERSON: ICD-10-CM

## 2022-04-20 DIAGNOSIS — Z78.9 TRANSGENDER: Primary | ICD-10-CM

## 2022-04-20 DIAGNOSIS — Z01.812 PRE-OPERATIVE LABORATORY EXAMINATION: ICD-10-CM

## 2022-04-20 DIAGNOSIS — Z01.810 PREOPERATIVE CARDIOVASCULAR EXAMINATION: Primary | ICD-10-CM

## 2022-04-20 DIAGNOSIS — I10 ESSENTIAL HYPERTENSION: ICD-10-CM

## 2022-04-20 LAB
ALBUMIN SERPL-MCNC: 4.2 G/DL (ref 3.4–5)
ALBUMIN/GLOB SERPL: 1.4 {RATIO} (ref 1–2)
ALP LIVER SERPL-CCNC: 70 U/L
ALT SERPL-CCNC: 28 U/L
ANION GAP SERPL CALC-SCNC: 5 MMOL/L (ref 0–18)
AST SERPL-CCNC: 17 U/L (ref 15–37)
BASOPHILS # BLD AUTO: 0.08 X10(3) UL (ref 0–0.2)
BASOPHILS NFR BLD AUTO: 0.7 %
BILIRUB SERPL-MCNC: 0.2 MG/DL (ref 0.1–2)
BUN BLD-MCNC: 15 MG/DL (ref 7–18)
CALCIUM BLD-MCNC: 9.7 MG/DL (ref 8.5–10.1)
CHLORIDE SERPL-SCNC: 107 MMOL/L (ref 98–112)
CHOLEST SERPL-MCNC: 148 MG/DL (ref ?–200)
CO2 SERPL-SCNC: 26 MMOL/L (ref 21–32)
CREAT BLD-MCNC: 0.94 MG/DL
EOSINOPHIL # BLD AUTO: 0.77 X10(3) UL (ref 0–0.7)
EOSINOPHIL NFR BLD AUTO: 6.4 %
ERYTHROCYTE [DISTWIDTH] IN BLOOD BY AUTOMATED COUNT: 13.5 %
EST. AVERAGE GLUCOSE BLD GHB EST-MCNC: 123 MG/DL (ref 68–126)
FASTING PATIENT LIPID ANSWER: YES
FASTING STATUS PATIENT QL REPORTED: YES
GLOBULIN PLAS-MCNC: 2.9 G/DL (ref 2.8–4.4)
GLUCOSE BLD-MCNC: 89 MG/DL (ref 70–99)
HBA1C MFR BLD: 5.9 % (ref ?–5.7)
HCT VFR BLD AUTO: 45.2 %
HDLC SERPL-MCNC: 31 MG/DL (ref 40–59)
HGB BLD-MCNC: 14.1 G/DL
IMM GRANULOCYTES # BLD AUTO: 0.05 X10(3) UL (ref 0–1)
IMM GRANULOCYTES NFR BLD: 0.4 %
LDLC SERPL CALC-MCNC: 61 MG/DL (ref ?–100)
LYMPHOCYTES # BLD AUTO: 3.44 X10(3) UL (ref 1–4)
LYMPHOCYTES NFR BLD AUTO: 28.4 %
MCH RBC QN AUTO: 26.2 PG (ref 26–34)
MCHC RBC AUTO-ENTMCNC: 31.2 G/DL (ref 31–37)
MCV RBC AUTO: 84 FL
MONOCYTES # BLD AUTO: 1.02 X10(3) UL (ref 0.1–1)
MONOCYTES NFR BLD AUTO: 8.4 %
NEUTROPHILS # BLD AUTO: 6.75 X10 (3) UL (ref 1.5–7.7)
NEUTROPHILS # BLD AUTO: 6.75 X10(3) UL (ref 1.5–7.7)
NEUTROPHILS NFR BLD AUTO: 55.7 %
NONHDLC SERPL-MCNC: 117 MG/DL (ref ?–130)
OSMOLALITY SERPL CALC.SUM OF ELEC: 286 MOSM/KG (ref 275–295)
PLATELET # BLD AUTO: 246 10(3)UL (ref 150–450)
POTASSIUM SERPL-SCNC: 4.3 MMOL/L (ref 3.5–5.1)
PROT SERPL-MCNC: 7.1 G/DL (ref 6.4–8.2)
RBC # BLD AUTO: 5.38 X10(6)UL
SODIUM SERPL-SCNC: 138 MMOL/L (ref 136–145)
TRIGL SERPL-MCNC: 357 MG/DL (ref 30–149)
VLDLC SERPL CALC-MCNC: 53 MG/DL (ref 0–30)
WBC # BLD AUTO: 12.1 X10(3) UL (ref 4–11)

## 2022-04-20 PROCEDURE — 83036 HEMOGLOBIN GLYCOSYLATED A1C: CPT

## 2022-04-20 PROCEDURE — 84402 ASSAY OF FREE TESTOSTERONE: CPT

## 2022-04-20 PROCEDURE — 3008F BODY MASS INDEX DOCD: CPT | Performed by: INTERNAL MEDICINE

## 2022-04-20 PROCEDURE — 3075F SYST BP GE 130 - 139MM HG: CPT | Performed by: INTERNAL MEDICINE

## 2022-04-20 PROCEDURE — 93000 ELECTROCARDIOGRAM COMPLETE: CPT | Performed by: INTERNAL MEDICINE

## 2022-04-20 PROCEDURE — 84403 ASSAY OF TOTAL TESTOSTERONE: CPT

## 2022-04-20 PROCEDURE — 85025 COMPLETE CBC W/AUTO DIFF WBC: CPT | Performed by: INTERNAL MEDICINE

## 2022-04-20 PROCEDURE — 36415 COLL VENOUS BLD VENIPUNCTURE: CPT

## 2022-04-20 PROCEDURE — 99214 OFFICE O/P EST MOD 30 MIN: CPT | Performed by: INTERNAL MEDICINE

## 2022-04-20 PROCEDURE — 3078F DIAST BP <80 MM HG: CPT | Performed by: INTERNAL MEDICINE

## 2022-04-20 PROCEDURE — 80061 LIPID PANEL: CPT

## 2022-04-20 PROCEDURE — 80053 COMPREHEN METABOLIC PANEL: CPT | Performed by: INTERNAL MEDICINE

## 2022-04-20 RX ORDER — LOSARTAN POTASSIUM 25 MG/1
25 TABLET ORAL DAILY
Qty: 90 TABLET | Refills: 3 | Status: SHIPPED | OUTPATIENT
Start: 2022-04-20

## 2022-04-20 RX ORDER — ROSUVASTATIN CALCIUM 20 MG/1
20 TABLET, COATED ORAL EVERY MORNING
Qty: 90 TABLET | Refills: 3 | Status: SHIPPED | OUTPATIENT
Start: 2022-04-20

## 2022-04-20 RX ORDER — SPIRONOLACTONE 100 MG/1
100 TABLET, FILM COATED ORAL DAILY
Qty: 90 TABLET | Refills: 3 | Status: SHIPPED | OUTPATIENT
Start: 2022-04-20

## 2022-04-25 ENCOUNTER — LAB ENCOUNTER (OUTPATIENT)
Dept: LAB | Age: 57
End: 2022-04-25
Attending: SURGERY
Payer: MEDICAID

## 2022-04-25 ENCOUNTER — TELEPHONE (OUTPATIENT)
Dept: INTERNAL MEDICINE CLINIC | Facility: CLINIC | Age: 57
End: 2022-04-25

## 2022-04-25 DIAGNOSIS — Z01.812 ENCOUNTER FOR PREOPERATIVE SCREENING LABORATORY TESTING FOR COVID-19 VIRUS: ICD-10-CM

## 2022-04-25 DIAGNOSIS — Z20.822 ENCOUNTER FOR PREOPERATIVE SCREENING LABORATORY TESTING FOR COVID-19 VIRUS: ICD-10-CM

## 2022-04-25 NOTE — TELEPHONE ENCOUNTER
Mandy Terry from [facility] called requesting pts signed EKG from pre-op 4/20 to be faxed    Fx: [phone]

## 2022-04-26 ENCOUNTER — ANESTHESIA EVENT (OUTPATIENT)
Dept: SURGERY | Facility: HOSPITAL | Age: 57
End: 2022-04-26
Payer: MEDICAID

## 2022-04-26 LAB — SARS-COV-2 RNA RESP QL NAA+PROBE: NOT DETECTED

## 2022-04-27 ENCOUNTER — HOSPITAL ENCOUNTER (OUTPATIENT)
Facility: HOSPITAL | Age: 57
Setting detail: HOSPITAL OUTPATIENT SURGERY
Discharge: HOME | End: 2022-04-27
Attending: SURGERY | Admitting: SURGERY
Payer: MEDICAID

## 2022-04-27 ENCOUNTER — ANESTHESIA (OUTPATIENT)
Dept: SURGERY | Facility: HOSPITAL | Age: 57
End: 2022-04-27
Payer: MEDICAID

## 2022-04-27 VITALS
WEIGHT: 167.56 LBS | HEART RATE: 99 BPM | OXYGEN SATURATION: 95 % | RESPIRATION RATE: 14 BRPM | HEIGHT: 63 IN | BODY MASS INDEX: 29.69 KG/M2 | TEMPERATURE: 99 F | SYSTOLIC BLOOD PRESSURE: 116 MMHG | DIASTOLIC BLOOD PRESSURE: 65 MMHG

## 2022-04-27 DIAGNOSIS — Z20.822 ENCOUNTER FOR PREOPERATIVE SCREENING LABORATORY TESTING FOR COVID-19 VIRUS: Primary | ICD-10-CM

## 2022-04-27 DIAGNOSIS — Z78.9 FEMALE-TO-MALE TRANSGENDER PERSON: ICD-10-CM

## 2022-04-27 DIAGNOSIS — Z01.812 ENCOUNTER FOR PREOPERATIVE SCREENING LABORATORY TESTING FOR COVID-19 VIRUS: Primary | ICD-10-CM

## 2022-04-27 PROCEDURE — 0JDF3ZZ EXTRACTION OF LEFT UPPER ARM SUBCUTANEOUS TISSUE AND FASCIA, PERCUTANEOUS APPROACH: ICD-10-PCS | Performed by: SURGERY

## 2022-04-27 PROCEDURE — 76942 ECHO GUIDE FOR BIOPSY: CPT | Performed by: ANESTHESIOLOGY

## 2022-04-27 PROCEDURE — 88307 TISSUE EXAM BY PATHOLOGIST: CPT | Performed by: SURGERY

## 2022-04-27 PROCEDURE — 0HTV0ZZ RESECTION OF BILATERAL BREAST, OPEN APPROACH: ICD-10-PCS | Performed by: SURGERY

## 2022-04-27 PROCEDURE — 0JDD3ZZ EXTRACTION OF RIGHT UPPER ARM SUBCUTANEOUS TISSUE AND FASCIA, PERCUTANEOUS APPROACH: ICD-10-PCS | Performed by: SURGERY

## 2022-04-27 RX ORDER — BUPIVACAINE HYDROCHLORIDE 2.5 MG/ML
INJECTION, SOLUTION EPIDURAL; INFILTRATION; INTRACAUDAL AS NEEDED
Status: DISCONTINUED | OUTPATIENT
Start: 2022-04-27 | End: 2022-04-27

## 2022-04-27 RX ORDER — ROCURONIUM BROMIDE 10 MG/ML
INJECTION, SOLUTION INTRAVENOUS AS NEEDED
Status: DISCONTINUED | OUTPATIENT
Start: 2022-04-27 | End: 2022-04-27 | Stop reason: SURG

## 2022-04-27 RX ORDER — NALOXONE HYDROCHLORIDE 0.4 MG/ML
80 INJECTION, SOLUTION INTRAMUSCULAR; INTRAVENOUS; SUBCUTANEOUS AS NEEDED
Status: DISCONTINUED | OUTPATIENT
Start: 2022-04-27 | End: 2022-04-27

## 2022-04-27 RX ORDER — HYDROCODONE BITARTRATE AND ACETAMINOPHEN 5; 325 MG/1; MG/1
1 TABLET ORAL AS NEEDED
Status: DISCONTINUED | OUTPATIENT
Start: 2022-04-27 | End: 2022-04-27

## 2022-04-27 RX ORDER — HYDROCODONE BITARTRATE AND ACETAMINOPHEN 10; 325 MG/1; MG/1
2 TABLET ORAL ONCE
Status: COMPLETED | OUTPATIENT
Start: 2022-04-27 | End: 2022-04-27

## 2022-04-27 RX ORDER — HYDROCODONE BITARTRATE AND ACETAMINOPHEN 10; 325 MG/1; MG/1
1 TABLET ORAL ONCE
Status: COMPLETED | OUTPATIENT
Start: 2022-04-27 | End: 2022-04-27

## 2022-04-27 RX ORDER — LIDOCAINE HYDROCHLORIDE AND EPINEPHRINE 10; 10 MG/ML; UG/ML
INJECTION, SOLUTION INFILTRATION; PERINEURAL AS NEEDED
Status: DISCONTINUED | OUTPATIENT
Start: 2022-04-27 | End: 2022-04-27 | Stop reason: HOSPADM

## 2022-04-27 RX ORDER — LABETALOL HYDROCHLORIDE 5 MG/ML
5 INJECTION, SOLUTION INTRAVENOUS EVERY 5 MIN PRN
Status: DISCONTINUED | OUTPATIENT
Start: 2022-04-27 | End: 2022-04-27

## 2022-04-27 RX ORDER — BUPIVACAINE HYDROCHLORIDE 2.5 MG/ML
INJECTION, SOLUTION EPIDURAL; INFILTRATION; INTRACAUDAL AS NEEDED
Status: DISCONTINUED | OUTPATIENT
Start: 2022-04-27 | End: 2022-04-27 | Stop reason: SURG

## 2022-04-27 RX ORDER — DEXAMETHASONE SODIUM PHOSPHATE 4 MG/ML
VIAL (ML) INJECTION AS NEEDED
Status: DISCONTINUED | OUTPATIENT
Start: 2022-04-27 | End: 2022-04-27 | Stop reason: SURG

## 2022-04-27 RX ORDER — MEPERIDINE HYDROCHLORIDE 25 MG/ML
12.5 INJECTION INTRAMUSCULAR; INTRAVENOUS; SUBCUTANEOUS AS NEEDED
Status: DISCONTINUED | OUTPATIENT
Start: 2022-04-27 | End: 2022-04-28

## 2022-04-27 RX ORDER — SODIUM CHLORIDE, SODIUM LACTATE, POTASSIUM CHLORIDE, CALCIUM CHLORIDE 600; 310; 30; 20 MG/100ML; MG/100ML; MG/100ML; MG/100ML
INJECTION, SOLUTION INTRAVENOUS CONTINUOUS
Status: DISCONTINUED | OUTPATIENT
Start: 2022-04-27 | End: 2022-04-28

## 2022-04-27 RX ORDER — CEFAZOLIN SODIUM/WATER 2 G/20 ML
2 SYRINGE (ML) INTRAVENOUS ONCE
Status: COMPLETED | OUTPATIENT
Start: 2022-04-27 | End: 2022-04-27

## 2022-04-27 RX ORDER — CEFAZOLIN SODIUM/WATER 2 G/20 ML
SYRINGE (ML) INTRAVENOUS
Status: DISCONTINUED
Start: 2022-04-27 | End: 2022-04-27

## 2022-04-27 RX ORDER — MIDAZOLAM HYDROCHLORIDE 1 MG/ML
1 INJECTION INTRAMUSCULAR; INTRAVENOUS EVERY 5 MIN PRN
Status: DISCONTINUED | OUTPATIENT
Start: 2022-04-27 | End: 2022-04-27

## 2022-04-27 RX ORDER — ONDANSETRON 2 MG/ML
INJECTION INTRAMUSCULAR; INTRAVENOUS AS NEEDED
Status: DISCONTINUED | OUTPATIENT
Start: 2022-04-27 | End: 2022-04-27 | Stop reason: SURG

## 2022-04-27 RX ORDER — METOCLOPRAMIDE HYDROCHLORIDE 5 MG/ML
INJECTION INTRAMUSCULAR; INTRAVENOUS AS NEEDED
Status: DISCONTINUED | OUTPATIENT
Start: 2022-04-27 | End: 2022-04-27 | Stop reason: SURG

## 2022-04-27 RX ORDER — DEXAMETHASONE SODIUM PHOSPHATE 10 MG/ML
INJECTION, SOLUTION INTRAMUSCULAR; INTRAVENOUS AS NEEDED
Status: DISCONTINUED | OUTPATIENT
Start: 2022-04-27 | End: 2022-04-27 | Stop reason: SURG

## 2022-04-27 RX ORDER — KETAMINE HYDROCHLORIDE 50 MG/ML
INJECTION, SOLUTION, CONCENTRATE INTRAMUSCULAR; INTRAVENOUS AS NEEDED
Status: DISCONTINUED | OUTPATIENT
Start: 2022-04-27 | End: 2022-04-27 | Stop reason: SURG

## 2022-04-27 RX ORDER — LIDOCAINE HYDROCHLORIDE 10 MG/ML
INJECTION, SOLUTION EPIDURAL; INFILTRATION; INTRACAUDAL; PERINEURAL AS NEEDED
Status: DISCONTINUED | OUTPATIENT
Start: 2022-04-27 | End: 2022-04-27 | Stop reason: SURG

## 2022-04-27 RX ORDER — HYDROMORPHONE HYDROCHLORIDE 1 MG/ML
0.4 INJECTION, SOLUTION INTRAMUSCULAR; INTRAVENOUS; SUBCUTANEOUS EVERY 5 MIN PRN
Status: DISCONTINUED | OUTPATIENT
Start: 2022-04-27 | End: 2022-04-27

## 2022-04-27 RX ORDER — HYDROCODONE BITARTRATE AND ACETAMINOPHEN 5; 325 MG/1; MG/1
1-2 TABLET ORAL EVERY 4 HOURS PRN
Qty: 40 TABLET | Refills: 0 | Status: SHIPPED | OUTPATIENT
Start: 2022-04-27

## 2022-04-27 RX ORDER — MIDAZOLAM HYDROCHLORIDE 1 MG/ML
INJECTION INTRAMUSCULAR; INTRAVENOUS AS NEEDED
Status: DISCONTINUED | OUTPATIENT
Start: 2022-04-27 | End: 2022-04-27 | Stop reason: SURG

## 2022-04-27 RX ORDER — PHENYLEPHRINE HCL 10 MG/ML
VIAL (ML) INJECTION AS NEEDED
Status: DISCONTINUED | OUTPATIENT
Start: 2022-04-27 | End: 2022-04-27 | Stop reason: SURG

## 2022-04-27 RX ORDER — NEOSTIGMINE METHYLSULFATE 1 MG/ML
INJECTION INTRAVENOUS AS NEEDED
Status: DISCONTINUED | OUTPATIENT
Start: 2022-04-27 | End: 2022-04-27 | Stop reason: SURG

## 2022-04-27 RX ORDER — DOCUSATE SODIUM 100 MG/1
100 CAPSULE, LIQUID FILLED ORAL 2 TIMES DAILY
Qty: 40 CAPSULE | Refills: 0 | Status: SHIPPED | OUTPATIENT
Start: 2022-04-27

## 2022-04-27 RX ORDER — HYDROCODONE BITARTRATE AND ACETAMINOPHEN 5; 325 MG/1; MG/1
2 TABLET ORAL AS NEEDED
Status: DISCONTINUED | OUTPATIENT
Start: 2022-04-27 | End: 2022-04-27

## 2022-04-27 RX ORDER — ACETAMINOPHEN 500 MG
1000 TABLET ORAL ONCE
Status: DISCONTINUED | OUTPATIENT
Start: 2022-04-27 | End: 2022-04-27 | Stop reason: HOSPADM

## 2022-04-27 RX ORDER — MINERAL OIL
OIL (ML) MISCELLANEOUS AS NEEDED
Status: DISCONTINUED | OUTPATIENT
Start: 2022-04-27 | End: 2022-04-27 | Stop reason: HOSPADM

## 2022-04-27 RX ORDER — ONDANSETRON 4 MG/1
4 TABLET, FILM COATED ORAL EVERY 8 HOURS PRN
Qty: 30 TABLET | Refills: 0 | Status: SHIPPED | OUTPATIENT
Start: 2022-04-27

## 2022-04-27 RX ORDER — GLYCOPYRROLATE 0.2 MG/ML
INJECTION, SOLUTION INTRAMUSCULAR; INTRAVENOUS AS NEEDED
Status: DISCONTINUED | OUTPATIENT
Start: 2022-04-27 | End: 2022-04-27 | Stop reason: SURG

## 2022-04-27 RX ORDER — ONDANSETRON 2 MG/ML
4 INJECTION INTRAMUSCULAR; INTRAVENOUS AS NEEDED
Status: DISCONTINUED | OUTPATIENT
Start: 2022-04-27 | End: 2022-04-27

## 2022-04-27 RX ORDER — ACETAMINOPHEN 500 MG
1000 TABLET ORAL ONCE AS NEEDED
Status: DISCONTINUED | OUTPATIENT
Start: 2022-04-27 | End: 2022-04-27

## 2022-04-27 RX ADMIN — DEXAMETHASONE SODIUM PHOSPHATE 8 MG: 4 MG/ML VIAL (ML) INJECTION at 11:15:00

## 2022-04-27 RX ADMIN — MIDAZOLAM HYDROCHLORIDE 2 MG: 1 INJECTION INTRAMUSCULAR; INTRAVENOUS at 11:04:00

## 2022-04-27 RX ADMIN — NEOSTIGMINE METHYLSULFATE 4 MG: 1 INJECTION INTRAVENOUS at 14:59:00

## 2022-04-27 RX ADMIN — PHENYLEPHRINE HCL 100 MCG: 10 MG/ML VIAL (ML) INJECTION at 11:57:00

## 2022-04-27 RX ADMIN — PHENYLEPHRINE HCL 100 MCG: 10 MG/ML VIAL (ML) INJECTION at 13:33:00

## 2022-04-27 RX ADMIN — ROCURONIUM BROMIDE 10 MG: 10 INJECTION, SOLUTION INTRAVENOUS at 13:47:00

## 2022-04-27 RX ADMIN — SODIUM CHLORIDE, SODIUM LACTATE, POTASSIUM CHLORIDE, CALCIUM CHLORIDE: 600; 310; 30; 20 INJECTION, SOLUTION INTRAVENOUS at 15:14:00

## 2022-04-27 RX ADMIN — ROCURONIUM BROMIDE 10 MG: 10 INJECTION, SOLUTION INTRAVENOUS at 13:15:00

## 2022-04-27 RX ADMIN — SODIUM CHLORIDE, SODIUM LACTATE, POTASSIUM CHLORIDE, CALCIUM CHLORIDE: 600; 310; 30; 20 INJECTION, SOLUTION INTRAVENOUS at 11:04:00

## 2022-04-27 RX ADMIN — BUPIVACAINE HYDROCHLORIDE 30 ML: 2.5 INJECTION, SOLUTION EPIDURAL; INFILTRATION; INTRACAUDAL at 11:14:00

## 2022-04-27 RX ADMIN — CEFAZOLIN SODIUM/WATER 2 G: 2 G/20 ML SYRINGE (ML) INTRAVENOUS at 11:11:00

## 2022-04-27 RX ADMIN — ROCURONIUM BROMIDE 50 MG: 10 INJECTION, SOLUTION INTRAVENOUS at 11:07:00

## 2022-04-27 RX ADMIN — GLYCOPYRROLATE 0.6 MG: 0.2 INJECTION, SOLUTION INTRAMUSCULAR; INTRAVENOUS at 14:59:00

## 2022-04-27 RX ADMIN — SODIUM CHLORIDE, SODIUM LACTATE, POTASSIUM CHLORIDE, CALCIUM CHLORIDE: 600; 310; 30; 20 INJECTION, SOLUTION INTRAVENOUS at 12:33:00

## 2022-04-27 RX ADMIN — ROCURONIUM BROMIDE 10 MG: 10 INJECTION, SOLUTION INTRAVENOUS at 12:33:00

## 2022-04-27 RX ADMIN — DEXAMETHASONE SODIUM PHOSPHATE 2 MG: 10 INJECTION, SOLUTION INTRAMUSCULAR; INTRAVENOUS at 11:14:00

## 2022-04-27 RX ADMIN — ROCURONIUM BROMIDE 20 MG: 10 INJECTION, SOLUTION INTRAVENOUS at 11:33:00

## 2022-04-27 RX ADMIN — GLYCOPYRROLATE 0.2 MG: 0.2 INJECTION, SOLUTION INTRAMUSCULAR; INTRAVENOUS at 11:07:00

## 2022-04-27 RX ADMIN — DEXAMETHASONE SODIUM PHOSPHATE 2 MG: 10 INJECTION, SOLUTION INTRAMUSCULAR; INTRAVENOUS at 11:12:00

## 2022-04-27 RX ADMIN — METOCLOPRAMIDE HYDROCHLORIDE 10 MG: 5 INJECTION INTRAMUSCULAR; INTRAVENOUS at 11:15:00

## 2022-04-27 RX ADMIN — BUPIVACAINE HYDROCHLORIDE 30 ML: 2.5 INJECTION, SOLUTION EPIDURAL; INFILTRATION; INTRACAUDAL at 11:12:00

## 2022-04-27 RX ADMIN — KETAMINE HYDROCHLORIDE 25 MG: 50 INJECTION, SOLUTION, CONCENTRATE INTRAMUSCULAR; INTRAVENOUS at 11:07:00

## 2022-04-27 RX ADMIN — LIDOCAINE HYDROCHLORIDE 50 MG: 10 INJECTION, SOLUTION EPIDURAL; INFILTRATION; INTRACAUDAL; PERINEURAL at 11:07:00

## 2022-04-27 RX ADMIN — ONDANSETRON 4 MG: 2 INJECTION INTRAMUSCULAR; INTRAVENOUS at 14:12:00

## 2022-04-27 NOTE — ANESTHESIA PROCEDURE NOTES
Airway  Date/Time: 4/27/2022 11:10 AM  Urgency: elective    Airway not difficult    General Information and Staff    Patient location during procedure: OR  Anesthesiologist: Fina Ferris MD  Resident/CRNA: Ricky Walker CRNA  Performed: CRNA     Indications and Patient Condition  Indications for airway management: anesthesia  Spontaneous Ventilation: absent  Sedation level: deep  Preoxygenated: yes  Patient position: sniffing  Mask difficulty assessment: 2 - vent by mask + OA or adjuvant +/- NMBA    Final Airway Details  Final airway type: endotracheal airway      Successful airway: ETT  Cuffed: yes   Successful intubation technique: direct laryngoscopy  Facilitating devices/methods: intubating stylet  Endotracheal tube insertion site: oral  Blade: Simin  Blade size: #3  ETT size (mm): 7.5    Cormack-Lehane Classification: grade I - full view of glottis  Placement verified by: chest auscultation and capnometry   Measured from: lips  ETT to lips (cm): 21  Number of attempts at approach: 1  Number of other approaches attempted: 0    Additional Comments  Dentition per pre op

## 2022-04-27 NOTE — BRIEF OP NOTE
Pre-Operative Diagnosis: Female-to-male transgender person [Z78.9]     Post-Operative Diagnosis: Female-to-male transgender person [Z78.9]      Procedure Performed:   Bilateral subtotal mastectomy, free nipple grafts, liposuction    Surgeon(s) and Role:     Angel Marie MD - Primary    Assistant(s):  PA: JOCELYNN Nelson     Surgical Findings: as dictated     Specimen: right and left breasts     Estimated Blood Loss: Blood Output: 20 mL (4/27/2022  3:02 PM)      Danis Orr PA-C  4/27/2022  3:34 PM

## 2022-04-27 NOTE — H&P
Dr. Anita Jason 4/20/22 surgical clearance H&P reviewed. No interval changes. Per Dr. Iliana Curtis, he has taken his sejal-operative hydrocortisone and stress dosing discussed with anesthesia. No interval changes to his medical history. Informed consent obtained and he wishes to proceed as planned.

## 2022-04-27 NOTE — ANESTHESIA POSTPROCEDURE EVALUATION
19 [facility] Patient Status:  Hospital Outpatient Surgery   Age/Gender 60year old adult MRN [id number]   [facility] SURGERY Attending Iveth Meng MD   [facility] Day # 0 PCP Allison Salinas MD       Anesthesia Post-op Note    Bilateral subtotal mastectomy, free nipple grafts, liposuction    Procedure Summary     Date: 04/27/22 Room / Location: [facility] MAIN OR 23 / [facility] MAIN OR    Anesthesia Start: 1104 Anesthesia Stop: 1514    Procedures:       Bilateral subtotal mastectomy, free nipple grafts, liposuction (Bilateral Breast)      . (N/A Abdomen) Diagnosis:       Female-to-male transgender person      (Female-to-male transgender person [Z78.9])    Surgeons: Iveth Meng MD Anesthesiologist: Elora Saint, MD    Anesthesia Type: general ASA Status: 3          Anesthesia Type: general    Vitals Value Taken Time   /89 04/27/22 1514   Temp 99.3 04/27/22 1514   Pulse 105 04/27/22 1514   Resp 14 04/27/22 1514   SpO2 97 04/27/22 1514       Patient Location: PACU    Anesthesia Type: general    Airway Patency: patent and extubated    Postop Pain Control: adequate    Mental Status: preanesthetic baseline    Nausea/Vomiting: none    Cardiopulmonary/Hydration status: stable euvolemic    Complications: no apparent anesthesia related complications    Postop vital signs: stable    Dental Exam: Unchanged from Preop    Patient to be discharged from PACU when criteria met.

## 2022-04-27 NOTE — ANESTHESIA PROCEDURE NOTES
Regional Block  Performed by: Ricky Walker CRNA  Authorized by: Fina Ferris MD       General Information and Staff    Start Time:   Anesthesiologist: Fina Ferris MD  CRNA:  Ricky Walker CRNA  Performed by:  CRNA  Patient Location:  OR    Block Placement: Post Induction  Site Identification: real time ultrasound guided and image stored and retrievable    Block site/laterality marked before start: site marked  Reason for Block: at surgeon's request and post-op pain management    Preanesthetic Checklist: 2 patient identifers, IV checked, risks and benefits discussed, monitors and equipment checked, pre-op evaluation, timeout performed, anesthesia consent, sterile technique used, no prohibitive neurological deficits and no local skin infection at insertion site      Procedure Details    Patient Position:  Supine  Prep: ChloraPrep    Monitoring:  Cardiac monitor, continuous pulse ox and blood pressure cuff  Anesthesia block type: serratous anterior blocks. Laterality:  Bilateral  Injection Technique:  Single-shot    Needle    Needle Type:  Short-bevel and echogenic  Needle Gauge:  21 G  Needle Length:  110 mm  Needle Localization:  Ultrasound guidance  Reason for Ultrasound Use: appropriate spread of the medication was noted in real time and no ultrasound evidence of intravascular and/or intraneural injection            Assessment    Injection Assessment:  Good spread noted, negative resistance, negative aspiration for heme, incremental injection, low pressure and local visualized surrounding nerve on ultrasound  Paresthesia Pain:  None  Heart Rate Change: No    - Patient tolerated block procedure well without evidence of immediate block related complications.      Medications      Additional Comments    Medication:  Bupivacaine 0.25% 30mL/side & PF Decadron 2 mg/side

## 2022-04-28 NOTE — OPERATIVE REPORT
[address]    PATIENT'S NAME: Julian LINDSEY   ATTENDING PHYSICIAN: Shahriar Hancock M.D. OPERATING PHYSICIAN: Shahriar Hancock M.D. PATIENT ACCOUNT#:   [de-identified]    LOCATION:  [address] [place] [address] 10 [address]  MEDICAL RECORD #:   [id number]       YOB: 1965  ADMISSION DATE:       04/27/2022      OPERATION DATE:  04/27/2022    OPERATIVE REPORT      PREOPERATIVE DIAGNOSIS:  Gender dysphoria. POSTOPERATIVE DIAGNOSIS:  Gender dysphoria. PROCEDURE:    1.   Bilateral mastectomy and free nipple graft. 2.   Bilateral axillary liposuction. ASSISTANT:  Alida Bennett PA-C. ANESTHESIA:  General.    ESTIMATED BLOOD LOSS:  20 mL. COMPLICATIONS:  None. INDICATIONS:  Patient is a 33-year-old transgender male referred for evaluation for a female-to-male top surgery. We discussed the option of subcutaneous mastectomy and free nipple graft with axillary liposuction. The risks, benefits, and alternatives were reviewed with the patient. He expressed understanding and wished to proceed. OPERATIVE TECHNIQUE:  Informed consent was obtained from the patient. The risks, benefits, and alternatives were reviewed with the patient preoperatively. He expressed understanding and wished to proceed. The patient was marked in preoperative holding in the upright position. The superior incision was placed at the level of the border of the pectoralis muscle. The lower incision was placed just above the inframammary fold. The incisions were then checked for symmetry. Areas of axillary lipodystrophy were marked for liposuction. The patient was then taken to the operating room, properly identified, placed in supine position. Sequential compression devices were placed on bilateral lower extremities. Intravenous antibiotic prophylaxis was administered. The patient then underwent successful induction of general anesthesia and endotracheal intubation.   The arms were placed abducted on foam-padded armboards and loosely secured with Kerlix. The chest was prepped and draped sterilely. The lateral aspects of the incisions were infiltrated with 1% of lidocaine with epinephrine. A stab incision was then created and 250 mL of tumescent solution was infiltrated along the pectoralis border and axillary regions bilaterally. While tumescent was taking effect, the new areolar diameter was marked with a 25 mm areolar marker. The nipple-areolar complexes were then excised with a scalpel as a full-thickness graft. They were placed on the back table in a moistened laparotomy sponge. Next, liposuction of the axillary regions bilaterally was performed. A total of approximately 50 mL per side of lipoaspirate was retrieved. Next, attention was turned to the right breast.  The incisions were created with a scalpel and deepened to the underlying superficial breast fascia. A superior subcutaneous flap was then developed. The dissection then proceeded to the pectoralis muscle where the breast tissue was elevated off the pectoralis fascia. It was marked with a short suture superior and a long suture lateral.  The wounds were then irrigated with saline irrigation. Some additional excision was performed on the superior flaps for uniform thickness. The flaps were then transposed and temporary stapled in place. A moderate dog-ear was noted laterally and this was excised with a scalpel. Attention was then turned to the left breast.  In a similar fashion, the incisions were created with a scalpel and deepened at the breast fascia with electrocautery. A superior subcutaneous flap was then elevated with electrocautery. The dissection then proceeded along the breast fascia to the pectoralis muscle. The breast tissue was then elevated off the pectoralis fascia and then marked with a short suture superior and a long suture lateral.  It was sent for permanent pathologic analysis.   Again, some additional tissue excision was performed for uniform thickness. Once adequate contour had been achieved, both wounds were irrigated with saline irrigation. Hemostasis was secured with electrocautery. A 15-English Brooks drain was exited through a lateral stab incision and sutured to the skin with 3-0 nylon suture bilaterally. The wound was then repaired in a layered fashion with interrupted 2-0 Vicryl deep sutures, interrupted 3-0 Vicryl deep dermal sutures, and running 4-0 Monocryl subcuticular suture. The patient then placed in the upright position. The new nipple position was marked and checked for symmetry. The wound bed was then de-epithelialized with a scalpel. The nipple-areolar complexes were then brought on the field and thinned with iris scissors. They were inset with running 5-0 Vicryl sutures. Bolsters consisting of mineral oil soaked cotton balls and Xeroform were then placed and tied over with 3-0 silk sutures. Biopatches and Tegaderms were placed on the drain sites. TopiFoam and an Ace wrap were then applied. The patient was awakened, extubated, and taken to the recovery area in stable condition. There were no operative complications. All needle, sponge, and instrument counts were correct at the end of the procedure. Dictated By Verenice Cook M.D.  d: 04/27/2022 15:35:07  t: 04/28/2022 01:34:36  Job [id number]  [facility]/

## 2022-04-29 ENCOUNTER — PATIENT MESSAGE (OUTPATIENT)
Dept: ENDOCRINOLOGY CLINIC | Facility: CLINIC | Age: 57
End: 2022-04-29

## 2022-04-29 LAB
TESTOSTERONE TOTAL: 1415.1 NG/DL
TESTOSTERONE, FREE -MS/MS: 376.6 PG/ML

## 2022-04-29 NOTE — TELEPHONE ENCOUNTER
From: Digna Ricardo  To: Chanda Jordan MD  Sent: 4/29/2022 10:56 AM CDT  Subject: Last shot of testosterone     The last shot was 2 days prior to the test due to having pre op tests at the time and they tested for the testosterone levels as well .   Aditi Lomax

## 2022-04-29 NOTE — TELEPHONE ENCOUNTER
Dr. Adam Kunz,  Patient's response to when last injection was: The last shot was 2 days prior to the test due to having pre op tests at the time and they tested for the testosterone levels as well .        Component      Latest Ref Rng & Units 4/20/2022   TESTOSTERONE, FREE LC-MS/MS      47.0 - 244.0 pg/mL 376.6 (H)   Testosterone Total      300.0 - 890.0 ng/dL 1415.1 (H)     Please advise -thanks

## 2022-05-02 ENCOUNTER — OFFICE VISIT (OUTPATIENT)
Dept: SURGERY | Facility: CLINIC | Age: 57
End: 2022-05-02
Payer: MEDICAID

## 2022-05-02 DIAGNOSIS — Z78.9 FEMALE-TO-MALE TRANSGENDER PERSON: Primary | ICD-10-CM

## 2022-05-02 PROCEDURE — 99024 POSTOP FOLLOW-UP VISIT: CPT | Performed by: PHYSICIAN ASSISTANT

## 2022-05-02 RX ORDER — IBUPROFEN 600 MG/1
600 TABLET ORAL EVERY 8 HOURS PRN
Qty: 20 TABLET | Refills: 0 | Status: SHIPPED | OUTPATIENT
Start: 2022-05-02

## 2022-05-02 NOTE — PROGRESS NOTES
Kamilla Deal is a 60year old adult who presents today for a follow-up after bilateral mastectomy and free nipple graft, bilateral axillary liposuction with Dr. Kathy Tovar on 4/27/2022. He denies fever and chills. He denies nausea, vomiting, diarrhea or constipation. His pain is controlled. He is not taking narcotic pain medication. He has been compliant with compression, activity restrictions. Physical Exam     Bilateral mastectomy skin is without erythema, ecchymosis, necrosis, skin breakdown. Bilateral nipple bolsters removed. Bilateral free nipple grafts adherent with some duskiness but no evidence of necrosis. No wound drainage. No evidence of hematoma or seroma. Bilateral incisions with Steri-Strips in place. Clean, dry and intact. No wound drainage  Bilateral drain sites clean, dry and intact. Drain effluent serosanguineous. There is a 5 mm x 3 mm blister near the right drain site. No surrounding erythema. There were no vitals filed for this visit. Assessment and Plan     Kamilla Deal is doing well s/p bilateral mastectomy and free nipple graft, bilateral axillary liposuction with Dr. Kathy Tovar on 4/27/2022. Bilateral nipple bolsters removed. A dressing of Neosporin, Xeroform, gauze was placed. He was instructed to change this daily. He was given an abdominal binder for compression. He will continue with compression, activity restrictions and drain care. We reviewed parameters for drain removal.  We reviewed reasons to contact our office including but not limited to fever, chills, swelling, redness, wound drainage, purple or black discoloration of the skin or nipple. He will follow-up in 1 week for repeat wound check and possible drain removal.    Questions were answered. Patient understands.      Mer Nino  5/2/2022  11:21 AM

## 2022-05-09 ENCOUNTER — OFFICE VISIT (OUTPATIENT)
Dept: SURGERY | Facility: CLINIC | Age: 57
End: 2022-05-09
Payer: MEDICAID

## 2022-05-09 DIAGNOSIS — Z78.9 FEMALE-TO-MALE TRANSGENDER PERSON: Primary | ICD-10-CM

## 2022-05-09 NOTE — PROGRESS NOTES
Digna Ricardo is a 60year old adult who presents today for a follow-up after bilateral mastectomy and free nipple graft, bilateral axillary liposuction with Dr. Rosamaria Ball on 4/27/2022. He denies fever and chills. He denies nausea, vomiting, diarrhea or constipation. His pain is controlled. He has been compliant with compression, activity restrictions. He reports he has been having difficulty with the dressings over his free nipple grafts shifting underneath his binder. Physical Exam     Bilateral mastectomy skin is without erythema, ecchymosis, necrosis, skin breakdown. Bilateral free nipple grafts adherent with epidermolysis of the right nipple, approximately 50%. Left nipple graft dusky with some epidermolysis, approximately 10%. There is no erythema or wound drainage bilaterally. No evidence of hematoma or seroma. Bilateral incisions clean, dry and intact. No wound drainage or wound dehiscence. Bilateral drain sites clean, dry and intact. Drain effluent serosanguineous. There is a 5 mm x 3 mm intact blister near the right drain site. No surrounding erythema. There were no vitals filed for this visit. Assessment and Plan     Digna Ricardo is doing well s/p bilateral mastectomy and free nipple graft, bilateral axillary liposuction with Dr. Rosamaria Ball on 4/27/2022. Bilateral drains were removed today due to low output. Neosporin, gauze was placed. He tolerated this well. The right blister was included in the dressing. New Steri-Strips were placed. New dressings of a generous amount of Neosporin, Xeroform, gauze, paper tape were placed over his bilateral free nipple grafts. I recommend he change this twice daily and make sure the free nipple grafts are never dry. I gave him a wider tape to take home to help keep the dressing in place. I recommend he continue with compression and activity restrictions. We reviewed reasons to contact our office.   He was instructed to call with any questions or concerns. He will follow-up with Dr. Michael Beckford in 1 week. Questions were answered. Patient understands.      Mer Baldwin  5/9/2022  10:21 AM

## 2022-05-11 ENCOUNTER — IMMUNIZATION (OUTPATIENT)
Dept: LAB | Age: 57
End: 2022-05-11
Attending: EMERGENCY MEDICINE
Payer: MEDICAID

## 2022-05-11 DIAGNOSIS — Z23 NEED FOR VACCINATION: Primary | ICD-10-CM

## 2022-05-11 PROCEDURE — 0054A SARSCOV2 VAC 30MCG TRS SUCR: CPT

## 2022-05-17 ENCOUNTER — LAB ENCOUNTER (OUTPATIENT)
Dept: LAB | Age: 57
End: 2022-05-17
Attending: INTERNAL MEDICINE
Payer: MEDICAID

## 2022-05-17 ENCOUNTER — OFFICE VISIT (OUTPATIENT)
Dept: SURGERY | Facility: CLINIC | Age: 57
End: 2022-05-17
Payer: MEDICAID

## 2022-05-17 DIAGNOSIS — Z78.9 TRANSGENDER: ICD-10-CM

## 2022-05-17 DIAGNOSIS — Z78.9 FEMALE-TO-MALE TRANSGENDER PERSON: Primary | ICD-10-CM

## 2022-05-17 DIAGNOSIS — R41.3 MEMORY LOSS: ICD-10-CM

## 2022-05-17 PROCEDURE — 84403 ASSAY OF TOTAL TESTOSTERONE: CPT

## 2022-05-17 PROCEDURE — 82607 VITAMIN B-12: CPT

## 2022-05-17 PROCEDURE — 36415 COLL VENOUS BLD VENIPUNCTURE: CPT

## 2022-05-17 PROCEDURE — 84402 ASSAY OF FREE TESTOSTERONE: CPT

## 2022-05-17 PROCEDURE — 99024 POSTOP FOLLOW-UP VISIT: CPT | Performed by: SURGERY

## 2022-05-17 NOTE — PROGRESS NOTES
Bhavya Serrano is a 60year old adult who presents today for a follow-up. He denies fevers or chills. He has been applying topical antibiotic ointment to his nipple grafts and has been wearing chest compression. Physical Examination:  Breasts: Bilateral breast incisions are clean dry and intact. Nipple areolar grafts are nearly completely healed. Assessment and Plan:  Patient is doing well. He was instructed to continue topical antibiotic ointment to his nipple grafts until completely healed. He may have been wearing a compression vest.  We reviewed scar care including massage with moisturizer and silicone products. He was instructed to avoid strenuous activity for total of 4 weeks postoperatively. He will follow-up in 4 to 6 weeks for scar check. The plan was reviewed with the patient and his partner and questions were answered.

## 2022-05-18 ENCOUNTER — TELEPHONE (OUTPATIENT)
Dept: INTERNAL MEDICINE CLINIC | Facility: CLINIC | Age: 57
End: 2022-05-18

## 2022-05-18 ENCOUNTER — TELEMEDICINE (OUTPATIENT)
Dept: INTERNAL MEDICINE CLINIC | Facility: CLINIC | Age: 57
End: 2022-05-18

## 2022-05-18 DIAGNOSIS — R41.3 MEMORY LOSS: Primary | ICD-10-CM

## 2022-05-18 DIAGNOSIS — Z12.11 SCREENING FOR COLON CANCER: Primary | ICD-10-CM

## 2022-05-18 LAB — VIT B12 SERPL-MCNC: 395 PG/ML (ref 193–986)

## 2022-05-18 PROCEDURE — 99213 OFFICE O/P EST LOW 20 MIN: CPT | Performed by: INTERNAL MEDICINE

## 2022-05-18 NOTE — TELEPHONE ENCOUNTER
Patient is also asking for a referral for Gender Affirmation Reassignment. [facility]  Dr. Lang Bennett   [address] [place] [id number], [address] [facility]  [phone]    FIT test placed in front accordion for .

## 2022-05-22 LAB
TESTOSTERONE TOTAL: 489.4 NG/DL
TESTOSTERONE, FREE -MS/MS: 116.6 PG/ML

## 2022-05-23 ENCOUNTER — PATIENT MESSAGE (OUTPATIENT)
Dept: INTERNAL MEDICINE CLINIC | Facility: CLINIC | Age: 57
End: 2022-05-23

## 2022-05-23 DIAGNOSIS — Z78.9 FEMALE-TO-MALE TRANSGENDER PERSON: Primary | ICD-10-CM

## 2022-05-23 NOTE — TELEPHONE ENCOUNTER
From: Hunter Cox  To: Henrry Tristan MD  Sent: 5/23/2022 11:56 AM CDT  Subject: Gender affirmation appointment     Dr. Marlyn Benitez,  I have been able to secure an appointment with Dr. Horacio Mcgrath at [facility] to discuss gender affirmation treatment options but I will need a referral in order to do this. I would appreciate it if you could give me a referral, so that I can see him this Wednesday. Thank you!   Carrol Conner

## 2022-05-23 NOTE — TELEPHONE ENCOUNTER
Patient requested a new referral to see Vania Davis  ,For consult of changing Gender,Please review and sign plan and care if you agree Thank you. Crystal Paulino V    [facility] REFERRALS.

## 2022-05-25 ENCOUNTER — PATIENT MESSAGE (OUTPATIENT)
Dept: INTERNAL MEDICINE CLINIC | Facility: CLINIC | Age: 57
End: 2022-05-25

## 2022-05-25 NOTE — TELEPHONE ENCOUNTER
From: Dmitriy Poster  To: Mercedes Puga MD  Sent: 5/25/2022 1:22 PM CDT  Subject: Recommendation Letter for Bottom Gender Affirmation surgery     Hello,  After completing the breast reconstruction surgery, Dr. Ester Campbell directed me to Dr. Chinyere Goodson at [facility] for the [facility] surgery. I met with Dr. Christine Ferreira today and he requested a letter from the primary physician , recommending the phalloplasty . I have already obtained letters from mental health specialists and could provide you with one of them as a sample. Dr. Christine Ferreira s phone number is   [phone] if you have any questions. I would really appreciate it if you could provide this letter in order for me to move forward with the process.    Cyrus Beltran

## 2022-06-02 RX ORDER — SYRINGE WITH NEEDLE, 1 ML 25GX5/8"
SYRINGE, EMPTY DISPOSABLE MISCELLANEOUS
Qty: 50 EACH | Refills: 0 | Status: SHIPPED | OUTPATIENT
Start: 2022-06-02

## 2022-06-02 RX ORDER — TESTOSTERONE CYPIONATE 200 MG/ML
INJECTION INTRAMUSCULAR
Qty: 10 ML | Refills: 0 | Status: SHIPPED | OUTPATIENT
Start: 2022-06-02

## 2022-06-09 ENCOUNTER — LAB ENCOUNTER (OUTPATIENT)
Dept: LAB | Age: 57
End: 2022-06-09
Attending: INTERNAL MEDICINE
Payer: MEDICAID

## 2022-06-09 DIAGNOSIS — Z12.11 SCREENING FOR COLON CANCER: ICD-10-CM

## 2022-06-09 PROCEDURE — 82274 ASSAY TEST FOR BLOOD FECAL: CPT

## 2022-06-10 LAB — HEMOCCULT STL QL: NEGATIVE

## 2022-06-21 ENCOUNTER — OFFICE VISIT (OUTPATIENT)
Dept: SURGERY | Facility: CLINIC | Age: 57
End: 2022-06-21
Payer: MEDICAID

## 2022-06-21 DIAGNOSIS — Z78.9 FEMALE-TO-MALE TRANSGENDER PERSON: Primary | ICD-10-CM

## 2022-06-21 PROCEDURE — 99024 POSTOP FOLLOW-UP VISIT: CPT | Performed by: SURGERY

## 2022-06-21 NOTE — PROGRESS NOTES
Cailin Lazo is a 60year old adult who presents today for a follow-up. He denies fever and chills. He was recently seen by Dr. Abdoulaye Fraser for consideration for phalloplasty. Physical Examination:  Breasts: Breast incisions are well-healed. Nipple areolar grafts are completely healed with some scaly disclamation. Small dogears are noted at the medial and lateral aspects of the breast incisions. Assessment and Plan:  Patient doing well. We discussed scar care including massage moisturizer and silicone products. The patient return for reevaluation in 3 months. She did dogears persist we discussed revision under local anesthesia in the office. The plan was reviewed with the patient and questions were answered.

## 2022-07-01 ENCOUNTER — OFFICE VISIT (OUTPATIENT)
Dept: NEUROLOGY | Facility: CLINIC | Age: 57
End: 2022-07-01
Payer: MEDICAID

## 2022-07-01 VITALS
WEIGHT: 167 LBS | DIASTOLIC BLOOD PRESSURE: 78 MMHG | BODY MASS INDEX: 29.59 KG/M2 | HEART RATE: 78 BPM | RESPIRATION RATE: 16 BRPM | SYSTOLIC BLOOD PRESSURE: 118 MMHG | HEIGHT: 63 IN

## 2022-07-01 DIAGNOSIS — R41.3 MEMORY CHANGE: Primary | ICD-10-CM

## 2022-07-01 PROCEDURE — 3074F SYST BP LT 130 MM HG: CPT | Performed by: OTHER

## 2022-07-01 PROCEDURE — 3078F DIAST BP <80 MM HG: CPT | Performed by: OTHER

## 2022-07-01 PROCEDURE — 99244 OFF/OP CNSLTJ NEW/EST MOD 40: CPT | Performed by: OTHER

## 2022-07-01 PROCEDURE — 3008F BODY MASS INDEX DOCD: CPT | Performed by: OTHER

## 2022-07-08 ENCOUNTER — LAB ENCOUNTER (OUTPATIENT)
Dept: LAB | Age: 57
End: 2022-07-08
Attending: Other
Payer: MEDICAID

## 2022-07-08 DIAGNOSIS — R41.3 MEMORY CHANGE: ICD-10-CM

## 2022-07-08 LAB
T4 FREE SERPL-MCNC: 0.7 NG/DL (ref 0.8–1.7)
TSI SER-ACNC: 1.54 MIU/ML (ref 0.36–3.74)

## 2022-07-08 PROCEDURE — 84443 ASSAY THYROID STIM HORMONE: CPT

## 2022-07-08 PROCEDURE — 84439 ASSAY OF FREE THYROXINE: CPT

## 2022-07-08 PROCEDURE — 36415 COLL VENOUS BLD VENIPUNCTURE: CPT

## 2022-07-08 PROCEDURE — 83921 ORGANIC ACID SINGLE QUANT: CPT

## 2022-07-13 LAB — MMA: 0.19 UMOL/L

## 2022-07-27 ENCOUNTER — NURSE ONLY (OUTPATIENT)
Dept: ELECTROPHYSIOLOGY | Facility: HOSPITAL | Age: 57
End: 2022-07-27
Attending: Other
Payer: MEDICAID

## 2022-07-27 DIAGNOSIS — R41.3 MEMORY CHANGE: ICD-10-CM

## 2022-07-27 PROCEDURE — 95819 EEG AWAKE AND ASLEEP: CPT | Performed by: OTHER

## 2022-07-28 NOTE — PROCEDURES
Date of Procedure: 7/27/2022    Procedure: EEG (ELECTROENCEPHALOGRAM)     DX: MEMORY CHANGE  HX: A 57 Y/O PT PRESENTS FOR EVALUATION OF MEMORY LOSS FOR 1-1.5 YEARS. HE ADMITS STRESS AND STATES HIS SHORT TERM MEMORY LOSS IS PROGRESSIVELY WORSENING. HIS 3 AUNTS HAD ALZHEIMER'S DEMENTIA. 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 HTN,HIGH CHOLESTEROL  RX:TESTOSRTERON CYPIONATE, SPIRONOLACTON, LOSARTAN,, ROSUVASTATIN, OMEPRAZOLE,    BACKGROUND ACTIVITY: Posterior rhythm was in the range of 7-8 Hz, reactive to eye opening; symmetrical and synchronous. Noted also are generalized intermittent slowing. Drowsiness is characterized by intermittent theta waves bitemporally. Occasional sharp transients noted in posterior region. EPILEPTIFORM DISCHARGES: There were no epileptiform discharges or periodic lateralized epileptiform discharges (PLEDs) recorded throughout the recording. HYPERVENTILATION: Hyperventilation was performed with no change. PHOTIC STIMULATION: Photic stimulation was performed with no change. Vertex waves in frontocentral, parietal region seen. IMPRESSION: There were no electroclinical seizure or epileptiform discharges captured. Clinical correlation is advised.     Eunice Aiken MD   Neurology  [facility] [place]  7/28/2022, 9:41 AM  CC: Princess Neida MD

## 2022-09-12 ENCOUNTER — OFFICE VISIT (OUTPATIENT)
Dept: ENDOCRINOLOGY CLINIC | Facility: CLINIC | Age: 57
End: 2022-09-12
Payer: MEDICAID

## 2022-09-12 VITALS
SYSTOLIC BLOOD PRESSURE: 122 MMHG | HEART RATE: 81 BPM | WEIGHT: 168 LBS | BODY MASS INDEX: 30 KG/M2 | DIASTOLIC BLOOD PRESSURE: 68 MMHG

## 2022-09-12 DIAGNOSIS — Z78.9 FEMALE-TO-MALE TRANSGENDER PERSON: Primary | ICD-10-CM

## 2022-09-12 PROCEDURE — 99213 OFFICE O/P EST LOW 20 MIN: CPT | Performed by: INTERNAL MEDICINE

## 2022-09-12 PROCEDURE — 3078F DIAST BP <80 MM HG: CPT | Performed by: INTERNAL MEDICINE

## 2022-09-12 PROCEDURE — 3074F SYST BP LT 130 MM HG: CPT | Performed by: INTERNAL MEDICINE

## 2022-09-12 RX ORDER — TESTOSTERONE CYPIONATE 200 MG/ML
300 INJECTION INTRAMUSCULAR
Qty: 10 ML | Refills: 2 | Status: SHIPPED | OUTPATIENT
Start: 2022-09-12

## 2022-09-12 RX ORDER — SYRINGE WITH NEEDLE, 1 ML 25GX5/8"
SYRINGE, EMPTY DISPOSABLE MISCELLANEOUS
Qty: 50 EACH | Refills: 0 | Status: SHIPPED | OUTPATIENT
Start: 2022-09-12

## 2022-09-25 ENCOUNTER — PATIENT MESSAGE (OUTPATIENT)
Dept: ENDOCRINOLOGY CLINIC | Facility: CLINIC | Age: 57
End: 2022-09-25

## 2022-09-26 RX ORDER — SYRINGE WITH NEEDLE, 1 ML 25GX5/8"
SYRINGE, EMPTY DISPOSABLE MISCELLANEOUS
Qty: 50 EACH | Refills: 0 | Status: SHIPPED | OUTPATIENT
Start: 2022-09-26

## 2022-09-26 NOTE — TELEPHONE ENCOUNTER
From: Sylvia Florentino  To: Barry Michael MD  Sent: 9/25/2022 10:33 AM CDT  Subject: Script for syringes and needles    Dr. Madhu Larkin,  During my last visit, we've discussed renewing my prescriptions that included a script for syringes and needles. The pharmacy informed me that they did not get a prescription from your office for the the syringes and needles. I was wondering if you could send them a prescription? Thank you.   Kinza Sellers

## 2022-09-27 ENCOUNTER — OFFICE VISIT (OUTPATIENT)
Dept: SURGERY | Facility: CLINIC | Age: 57
End: 2022-09-27

## 2022-09-27 DIAGNOSIS — Z78.9 FEMALE-TO-MALE TRANSGENDER PERSON: Primary | ICD-10-CM

## 2022-09-27 PROCEDURE — 99212 OFFICE O/P EST SF 10 MIN: CPT | Performed by: SURGERY

## 2022-09-27 NOTE — PROGRESS NOTES
Hunter Cox is a 60year old adult who presents today for a follow-up. He complains of dogears at the lateral aspects of his breast scars. Physical Examination:  Breasts: General inspection of the breast shows good shape and symmetry. Nipple areolar grafts are well-healed. Small dogears are noted at the lateral aspects of the breast scars. Assessment and Plan:  Patient is doing well. We discussed proceeding with excision of dogears under local anesthesia. The nature procedure was reviewed with the patient and his partner. The risk of surgery including but not limited to bleeding infection, hypertrophic scarring or keloid, delayed wound healing, deformity, and need for further surgery were discussed. Multiple questions were answered to patient satisfaction. No guarantees as to outcome were offered. The patient expresses understanding and wishes to proceed.

## 2022-09-28 ENCOUNTER — OFFICE VISIT (OUTPATIENT)
Dept: INTERNAL MEDICINE CLINIC | Facility: CLINIC | Age: 57
End: 2022-09-28

## 2022-09-28 ENCOUNTER — LAB ENCOUNTER (OUTPATIENT)
Dept: LAB | Age: 57
End: 2022-09-28
Attending: INTERNAL MEDICINE

## 2022-09-28 VITALS
RESPIRATION RATE: 12 BRPM | HEIGHT: 63 IN | BODY MASS INDEX: 28.91 KG/M2 | TEMPERATURE: 99 F | HEART RATE: 70 BPM | OXYGEN SATURATION: 100 % | SYSTOLIC BLOOD PRESSURE: 116 MMHG | WEIGHT: 163.19 LBS | DIASTOLIC BLOOD PRESSURE: 66 MMHG

## 2022-09-28 DIAGNOSIS — R73.01 ELEVATED FASTING GLUCOSE: ICD-10-CM

## 2022-09-28 DIAGNOSIS — R10.10 UPPER ABDOMINAL PAIN: Primary | ICD-10-CM

## 2022-09-28 DIAGNOSIS — R63.4 WEIGHT LOSS: ICD-10-CM

## 2022-09-28 DIAGNOSIS — R19.5 CHANGE IN STOOL: ICD-10-CM

## 2022-09-28 DIAGNOSIS — Z78.9 FEMALE-TO-MALE TRANSGENDER PERSON: ICD-10-CM

## 2022-09-28 DIAGNOSIS — R14.0 ABDOMINAL BLOATING: ICD-10-CM

## 2022-09-28 DIAGNOSIS — R10.10 UPPER ABDOMINAL PAIN: ICD-10-CM

## 2022-09-28 LAB
ALBUMIN SERPL-MCNC: 4.4 G/DL (ref 3.4–5)
ALBUMIN/GLOB SERPL: 1.2 {RATIO} (ref 1–2)
ALP LIVER SERPL-CCNC: 73 U/L
ALT SERPL-CCNC: 27 U/L
AMYLASE SERPL-CCNC: 49 U/L (ref 25–115)
ANION GAP SERPL CALC-SCNC: 1 MMOL/L (ref 0–18)
AST SERPL-CCNC: 18 U/L (ref 15–37)
BASOPHILS # BLD AUTO: 0.08 X10(3) UL (ref 0–0.2)
BASOPHILS NFR BLD AUTO: 0.9 %
BILIRUB SERPL-MCNC: 0.3 MG/DL (ref 0.1–2)
BUN BLD-MCNC: 13 MG/DL (ref 7–18)
CALCIUM BLD-MCNC: 9.8 MG/DL (ref 8.5–10.1)
CHLORIDE SERPL-SCNC: 107 MMOL/L (ref 98–112)
CO2 SERPL-SCNC: 29 MMOL/L (ref 21–32)
CREAT BLD-MCNC: 1 MG/DL
EOSINOPHIL # BLD AUTO: 0.78 X10(3) UL (ref 0–0.7)
EOSINOPHIL NFR BLD AUTO: 8.7 %
ERYTHROCYTE [DISTWIDTH] IN BLOOD BY AUTOMATED COUNT: 18 %
EST. AVERAGE GLUCOSE BLD GHB EST-MCNC: 126 MG/DL (ref 68–126)
FASTING STATUS PATIENT QL REPORTED: YES
GFR SERPLBLD BASED ON 1.73 SQ M-ARVRAT: 87 ML/MIN/1.73M2 (ref 60–?)
GLOBULIN PLAS-MCNC: 3.7 G/DL (ref 2.8–4.4)
GLUCOSE BLD-MCNC: 116 MG/DL (ref 70–99)
HBA1C MFR BLD: 6 % (ref ?–5.7)
HCT VFR BLD AUTO: 47.4 %
HGB BLD-MCNC: 14.7 G/DL
IMM GRANULOCYTES # BLD AUTO: 0.03 X10(3) UL (ref 0–1)
IMM GRANULOCYTES NFR BLD: 0.3 %
LIPASE SERPL-CCNC: 272 U/L (ref 73–393)
LYMPHOCYTES # BLD AUTO: 2.79 X10(3) UL (ref 1–4)
LYMPHOCYTES NFR BLD AUTO: 31.1 %
MCH RBC QN AUTO: 25 PG (ref 26–34)
MCHC RBC AUTO-ENTMCNC: 31 G/DL (ref 31–37)
MCV RBC AUTO: 80.7 FL
MONOCYTES # BLD AUTO: 0.76 X10(3) UL (ref 0.1–1)
MONOCYTES NFR BLD AUTO: 8.5 %
NEUTROPHILS # BLD AUTO: 4.52 X10 (3) UL (ref 1.5–7.7)
NEUTROPHILS # BLD AUTO: 4.52 X10(3) UL (ref 1.5–7.7)
NEUTROPHILS NFR BLD AUTO: 50.5 %
OSMOLALITY SERPL CALC.SUM OF ELEC: 285 MOSM/KG (ref 275–295)
PLATELET # BLD AUTO: 210 10(3)UL (ref 150–450)
POTASSIUM SERPL-SCNC: 4.6 MMOL/L (ref 3.5–5.1)
PROT SERPL-MCNC: 8.1 G/DL (ref 6.4–8.2)
RBC # BLD AUTO: 5.87 X10(6)UL
SODIUM SERPL-SCNC: 137 MMOL/L (ref 136–145)
WBC # BLD AUTO: 9 X10(3) UL (ref 4–11)

## 2022-09-28 PROCEDURE — 36415 COLL VENOUS BLD VENIPUNCTURE: CPT

## 2022-09-28 PROCEDURE — 80053 COMPREHEN METABOLIC PANEL: CPT

## 2022-09-28 PROCEDURE — 82150 ASSAY OF AMYLASE: CPT

## 2022-09-28 PROCEDURE — 85025 COMPLETE CBC W/AUTO DIFF WBC: CPT

## 2022-09-28 PROCEDURE — 83690 ASSAY OF LIPASE: CPT

## 2022-09-28 PROCEDURE — 3074F SYST BP LT 130 MM HG: CPT | Performed by: INTERNAL MEDICINE

## 2022-09-28 PROCEDURE — 3078F DIAST BP <80 MM HG: CPT | Performed by: INTERNAL MEDICINE

## 2022-09-28 PROCEDURE — 3008F BODY MASS INDEX DOCD: CPT | Performed by: INTERNAL MEDICINE

## 2022-09-28 PROCEDURE — 83036 HEMOGLOBIN GLYCOSYLATED A1C: CPT

## 2022-09-28 PROCEDURE — 99214 OFFICE O/P EST MOD 30 MIN: CPT | Performed by: INTERNAL MEDICINE

## 2022-09-28 NOTE — PATIENT INSTRUCTIONS
Blood work    Call to schedule CT scan    Call to schedule appointment with gastroenterology - Dr. Fawn Christiansen   [address] [place] [address] [address], [facility], [address]  Phone: [phone]

## 2022-10-07 ENCOUNTER — HOSPITAL ENCOUNTER (OUTPATIENT)
Dept: CT IMAGING | Age: 57
Discharge: HOME | End: 2022-10-07
Attending: INTERNAL MEDICINE
Payer: MEDICAID

## 2022-10-07 DIAGNOSIS — R63.4 WEIGHT LOSS: ICD-10-CM

## 2022-10-07 DIAGNOSIS — R10.10 UPPER ABDOMINAL PAIN: ICD-10-CM

## 2022-10-07 DIAGNOSIS — R14.0 ABDOMINAL BLOATING: ICD-10-CM

## 2022-10-07 PROCEDURE — 74177 CT ABD & PELVIS W/CONTRAST: CPT | Performed by: INTERNAL MEDICINE

## 2022-10-16 ENCOUNTER — IMMUNIZATION (OUTPATIENT)
Dept: LAB | Age: 57
End: 2022-10-16
Attending: EMERGENCY MEDICINE
Payer: MEDICAID

## 2022-10-16 DIAGNOSIS — Z23 NEED FOR VACCINATION: Primary | ICD-10-CM

## 2022-10-16 PROCEDURE — 0124A SARSCOV2 VAC BVL 30MCG/0.3ML: CPT

## 2022-10-20 ENCOUNTER — PATIENT MESSAGE (OUTPATIENT)
Dept: ENDOCRINOLOGY CLINIC | Facility: CLINIC | Age: 57
End: 2022-10-20

## 2022-10-21 NOTE — TELEPHONE ENCOUNTER
Spoke to Alexis with pharmacy benefits ([phone]) regarding PA for testosterone 200mg/mL  (previous PA  22)- PA request faxed to [facility] (cannot do PA over phone)

## 2022-10-21 NOTE — TELEPHONE ENCOUNTER
From: Bhavya Serrano  To: Josh Bridges MD  Sent: 10/20/2022 3:17 PM CDT  Subject: Prescription Authorization     Dr. Evita Sanchez,  I attempted to fill my monthly dose of testosterone but the pharmacy informed me that authorization needs to be obtained prior to completing the refill. The pharmacist sent the paperwork to your office. Thank you and have a wonderful weekend!     Nikko Langford

## 2022-10-25 ENCOUNTER — OFFICE VISIT (OUTPATIENT)
Dept: SURGERY | Facility: CLINIC | Age: 57
End: 2022-10-25
Payer: MEDICAID

## 2022-10-25 DIAGNOSIS — Z90.13 S/P MASTECTOMY, BILATERAL: Primary | ICD-10-CM

## 2022-10-25 PROCEDURE — 12035 INTMD RPR S/A/T/EXT 12.6-20: CPT | Performed by: SURGERY

## 2022-10-25 NOTE — PROCEDURES
Patient presents for revision of his dogears at the lateral aspect of his breast scars. There is ink were encompassed in transverse ellipses. They are infiltrated with local 20 cc of 1% lidocaine epinephrine. The ellipses were excised bilaterally and tissue discarded. The wounds were repaired in layered fashion with 3-0 Vicryl deep sutures and 4 Monocryl subicular suture. There are a lot of Steri-Strips were applied. The patient tolerated procedure well. There are no complications. The length of the closure totaled 14 cm.

## 2022-10-26 ENCOUNTER — PATIENT MESSAGE (OUTPATIENT)
Dept: ENDOCRINOLOGY CLINIC | Facility: CLINIC | Age: 57
End: 2022-10-26

## 2022-10-27 NOTE — TELEPHONE ENCOUNTER
From: Kamilla Deal  To: Jose Shields MD  Sent: 10/26/2022 8:15 PM CDT  Subject: Medication refill     Dr. Noelle Jennings,  I keep checking with the pharmacy in order to get my Testosterone for October but they are still unable to fill the prescription, due to the need for authorization. I am not sure how missing the shots for this month is going to affect me. Please let me know how I should proceed. Thank you.     Moe Adair

## 2022-10-27 NOTE — TELEPHONE ENCOUNTER
Reviewed with Simeon Jules RN who states pt may need teststerone labs and will follow up with pt and provider

## 2022-10-27 NOTE — TELEPHONE ENCOUNTER
Dr. Nohemy Calderon    Last testosterone drawn 5/17/22. Patient coming in tomorrow for injection. Do you want testosterone level drawn next Friday? Please advise.

## 2022-10-28 ENCOUNTER — NURSE ONLY (OUTPATIENT)
Dept: ENDOCRINOLOGY CLINIC | Facility: CLINIC | Age: 57
End: 2022-10-28
Payer: MEDICAID

## 2022-10-28 DIAGNOSIS — Z78.9 TRANSGENDER: ICD-10-CM

## 2022-10-28 DIAGNOSIS — Z78.9 FEMALE-TO-MALE TRANSGENDER PERSON: Primary | ICD-10-CM

## 2022-10-28 PROCEDURE — 96372 THER/PROPH/DIAG INJ SC/IM: CPT | Performed by: INTERNAL MEDICINE

## 2022-10-28 RX ORDER — TESTOSTERONE CYPIONATE 200 MG/ML
300 INJECTION INTRAMUSCULAR ONCE
Status: COMPLETED | OUTPATIENT
Start: 2022-10-28 | End: 2022-10-28

## 2022-10-28 RX ADMIN — TESTOSTERONE CYPIONATE 300 MG: 200 INJECTION INTRAMUSCULAR at 10:41:00

## 2022-10-28 NOTE — PROGRESS NOTES
Patient arrived at clinic for testosterone injection - patient given and tolerated 300mg testosterone injection to the left upper outer gluteus.   Patient waiting on approval from insurance for testosterone RX  Patient left clinic in stable condition

## 2022-10-31 ENCOUNTER — PATIENT MESSAGE (OUTPATIENT)
Dept: ENDOCRINOLOGY CLINIC | Facility: CLINIC | Age: 57
End: 2022-10-31

## 2022-11-01 NOTE — TELEPHONE ENCOUNTER
Dr. Lisa Black prescription pended. Also routing to PA department for authorization. Medication PA Requested:testosterone cypionate 200 mg/mL Intramuscular Solution                                                          CoverMyMeds Used:  Key:  Quantity: 10 mL Rfls 2  Day Supply:  Sig: Inject 1.5 mL (300 mg total) into the muscle every 14 (fourteen) days.   DX Code: Z78.9                                    CPT code (if applicable):   Case Number/Pending Ref#:

## 2022-11-01 NOTE — TELEPHONE ENCOUNTER
From: Madhu Burnett  To: Grant Desai MD  Sent: 10/31/2022 11:50 PM CDT  Subject: Insurance authorization     Dr. Loza He,  I am sorry to bother you again but the authorization for the testosterone has still not gone through. I was told that the paperwork was sent on October 21st but nothing yet. Is there any way that the authorization is attempted again please?    Deborah Del Rio

## 2022-11-02 RX ORDER — TESTOSTERONE CYPIONATE 200 MG/ML
300 INJECTION INTRAMUSCULAR
Qty: 10 ML | Refills: 2 | Status: SHIPPED | OUTPATIENT
Start: 2022-11-02

## 2022-11-02 RX ORDER — SYRINGE WITH NEEDLE, 1 ML 25GX5/8"
SYRINGE, EMPTY DISPOSABLE MISCELLANEOUS
Qty: 50 EACH | Refills: 0 | Status: SHIPPED | OUTPATIENT
Start: 2022-11-02

## 2022-11-03 NOTE — TELEPHONE ENCOUNTER
Received approval from [facility]- for testosterone 200 mg/ml effective 8/3/22-11/01/23. It's approved for 10 ml per 28 days    Mariah Eduardo from [place] was notified. Approval notification also sent to patient via [facility].

## 2022-11-04 NOTE — TELEPHONE ENCOUNTER
Medication PA Requested:testosterone cypionate 200 mg/mL Intramuscular Solution                                                          CoverMyMeds Used:  Key:  Quantity: 10 mL Rfls 2  Day Supply:  Sig: Inject 1.5 mL (300 mg total) into the muscle every 14 (fourteen) days. DX Code: Z78.9      Faxed [employer] Pa form marked Urgent with LOV 9/12/2022, and testosterone level 5/17/2022. Awaiting determination.

## 2022-12-03 ENCOUNTER — PATIENT MESSAGE (OUTPATIENT)
Dept: INTERNAL MEDICINE CLINIC | Facility: CLINIC | Age: 57
End: 2022-12-03

## 2022-12-07 NOTE — TELEPHONE ENCOUNTER
From: Madhu Burnett  To: Nghia Abdalla MD  Sent: 12/3/2022 8:37 AM CST  Subject: Gastroenterology Referral     Dr. Mak Pettit,  Unfortunately, I would need another referral for gastroenterology and a recommendation for a specialist. Jovita Suarez provided a referral for Dr. Manisha Mccann 2 months ago and I scheduled an appointment immediately but the first one available was 2 months away in December. I was aware how hard it is to get an appointment sooner and was happy to wait. It was supposed to be yesterday at 3:30 pm but 2 hours prior to the appointment their office left me a voicemail that the doctor would not be available at 3:30 pm and if I could come at 2pm. I had taken a day off from work and waited so long to see a specialist that I called back right away to let them know that I could come right away. Unfortunately, the office voicemail stated that they close at 1pm and no one is available. I left a voicemail, stating I am on my way and will be there 10 minutes before 2pm. Received a call by the RN who stated that I should not bother, and the doctor had left for the day, offering me another appointment in   February. I found this to be extremely rude, unethical, and unprofessional. Not to mention that if they did that now, who could say they would not do it again in February and I refused. The blatant disregard for their patients is staggering. Could you please recommend another gastroenterologist because my abdomen continues to be swollen and bloated, causing discomfort and pain.   Deborah Del Rio

## 2022-12-14 ENCOUNTER — OFFICE VISIT (OUTPATIENT)
Dept: INTERNAL MEDICINE CLINIC | Facility: CLINIC | Age: 57
End: 2022-12-14
Payer: MEDICAID

## 2022-12-14 VITALS
SYSTOLIC BLOOD PRESSURE: 110 MMHG | OXYGEN SATURATION: 99 % | TEMPERATURE: 99 F | HEIGHT: 63 IN | RESPIRATION RATE: 16 BRPM | WEIGHT: 171 LBS | HEART RATE: 77 BPM | BODY MASS INDEX: 30.3 KG/M2 | DIASTOLIC BLOOD PRESSURE: 70 MMHG

## 2022-12-14 DIAGNOSIS — Z78.9 FEMALE-TO-MALE TRANSGENDER PERSON: ICD-10-CM

## 2022-12-14 DIAGNOSIS — R14.0 ABDOMINAL BLOATING: ICD-10-CM

## 2022-12-14 DIAGNOSIS — C67.2 CANCER OF LATERAL WALL OF URINARY BLADDER (HCC): ICD-10-CM

## 2022-12-14 DIAGNOSIS — R73.01 ELEVATED FASTING GLUCOSE: ICD-10-CM

## 2022-12-14 DIAGNOSIS — I70.0 ATHEROSCLEROSIS OF AORTA (HCC): ICD-10-CM

## 2022-12-14 DIAGNOSIS — I10 ESSENTIAL HYPERTENSION: ICD-10-CM

## 2022-12-14 DIAGNOSIS — E78.2 MIXED HYPERLIPIDEMIA: ICD-10-CM

## 2022-12-14 DIAGNOSIS — Z00.00 ENCOUNTER FOR ROUTINE ADULT MEDICAL EXAMINATION: Primary | ICD-10-CM

## 2022-12-14 PROCEDURE — 3078F DIAST BP <80 MM HG: CPT | Performed by: INTERNAL MEDICINE

## 2022-12-14 PROCEDURE — 99396 PREV VISIT EST AGE 40-64: CPT | Performed by: INTERNAL MEDICINE

## 2022-12-14 PROCEDURE — 3008F BODY MASS INDEX DOCD: CPT | Performed by: INTERNAL MEDICINE

## 2022-12-14 PROCEDURE — 3074F SYST BP LT 130 MM HG: CPT | Performed by: INTERNAL MEDICINE

## 2022-12-14 RX ORDER — BUDESONIDE AND FORMOTEROL FUMARATE DIHYDRATE 160; 4.5 UG/1; UG/1
2 AEROSOL RESPIRATORY (INHALATION) 2 TIMES DAILY
Qty: 3 EACH | Refills: 3 | Status: SHIPPED | OUTPATIENT
Start: 2022-12-14

## 2022-12-14 RX ORDER — OMEPRAZOLE 40 MG/1
40 CAPSULE, DELAYED RELEASE ORAL DAILY
Qty: 90 CAPSULE | Refills: 3 | Status: SHIPPED | OUTPATIENT
Start: 2022-12-14

## 2022-12-14 RX ORDER — IBUPROFEN 600 MG/1
600 TABLET ORAL EVERY 8 HOURS PRN
Qty: 90 TABLET | Refills: 3 | Status: SHIPPED | OUTPATIENT
Start: 2022-12-14

## 2022-12-14 NOTE — PATIENT INSTRUCTIONS
[facility]     Fasting blood work in Feb 2023    Consider Tdap tetanus vaccine and Shingrix shingles vaccine    Consider adding probiotic

## 2023-01-17 ENCOUNTER — OFFICE VISIT (OUTPATIENT)
Dept: SURGERY | Facility: CLINIC | Age: 58
End: 2023-01-17
Payer: MEDICAID

## 2023-01-17 DIAGNOSIS — Z78.9 FEMALE-TO-MALE TRANSGENDER PERSON: Primary | ICD-10-CM

## 2023-01-17 NOTE — PROGRESS NOTES
Adonis Simmons is a 59year old adult who presents today for a follow-up. He is without new complaints. Physical Examination:  Breasts: Bilateral breast incisions are clean dry and intact. Acceptable shape and symmetry is noted. Assessment and Plan:  Patient is doing well. Reviewed scar care including massage moisturizer and silicone products. The patient may follow-up in apparent basis but was offered to return at any time should any issues arise. The plan was reviewed with the patient and questions were answered.

## 2023-01-21 ENCOUNTER — OFFICE VISIT (OUTPATIENT)
Dept: ENDOCRINOLOGY CLINIC | Facility: CLINIC | Age: 58
End: 2023-01-21

## 2023-01-21 VITALS
BODY MASS INDEX: 31 KG/M2 | WEIGHT: 173 LBS | SYSTOLIC BLOOD PRESSURE: 113 MMHG | HEART RATE: 72 BPM | DIASTOLIC BLOOD PRESSURE: 69 MMHG

## 2023-01-21 DIAGNOSIS — E27.9 ADRENAL DISEASE (HCC): ICD-10-CM

## 2023-01-21 DIAGNOSIS — Z78.9 FEMALE-TO-MALE TRANSGENDER PERSON: Primary | ICD-10-CM

## 2023-01-21 PROCEDURE — 99214 OFFICE O/P EST MOD 30 MIN: CPT | Performed by: INTERNAL MEDICINE

## 2023-01-21 PROCEDURE — 3078F DIAST BP <80 MM HG: CPT | Performed by: INTERNAL MEDICINE

## 2023-01-21 PROCEDURE — 3074F SYST BP LT 130 MM HG: CPT | Performed by: INTERNAL MEDICINE

## 2023-01-21 RX ORDER — TESTOSTERONE CYPIONATE 200 MG/ML
300 INJECTION INTRAMUSCULAR
Qty: 10 ML | Refills: 2 | Status: SHIPPED | OUTPATIENT
Start: 2023-01-21

## 2023-01-24 RX ORDER — IBUPROFEN 600 MG/1
600 TABLET ORAL EVERY 8 HOURS PRN
Qty: 90 TABLET | Refills: 3 | OUTPATIENT
Start: 2023-01-24

## 2023-02-15 ENCOUNTER — LAB ENCOUNTER (OUTPATIENT)
Dept: LAB | Age: 58
End: 2023-02-15
Attending: INTERNAL MEDICINE
Payer: MEDICAID

## 2023-02-15 DIAGNOSIS — Z78.9 FEMALE-TO-MALE TRANSGENDER PERSON: ICD-10-CM

## 2023-02-15 DIAGNOSIS — R73.01 ELEVATED FASTING GLUCOSE: ICD-10-CM

## 2023-02-15 DIAGNOSIS — E78.2 MIXED HYPERLIPIDEMIA: ICD-10-CM

## 2023-02-15 LAB
ALBUMIN SERPL-MCNC: 3.9 G/DL (ref 3.4–5)
ALBUMIN/GLOB SERPL: 1.1 {RATIO} (ref 1–2)
ALP LIVER SERPL-CCNC: 65 U/L
ALT SERPL-CCNC: 30 U/L
ANION GAP SERPL CALC-SCNC: 4 MMOL/L (ref 0–18)
AST SERPL-CCNC: 18 U/L (ref 15–37)
BASOPHILS # BLD AUTO: 0.08 X10(3) UL (ref 0–0.2)
BASOPHILS NFR BLD AUTO: 0.8 %
BILIRUB SERPL-MCNC: 0.4 MG/DL (ref 0.1–2)
BUN BLD-MCNC: 14 MG/DL (ref 7–18)
CALCIUM BLD-MCNC: 9.8 MG/DL (ref 8.5–10.1)
CHLORIDE SERPL-SCNC: 107 MMOL/L (ref 98–112)
CHOLEST SERPL-MCNC: 170 MG/DL (ref ?–200)
CO2 SERPL-SCNC: 30 MMOL/L (ref 21–32)
CREAT BLD-MCNC: 0.91 MG/DL
EOSINOPHIL # BLD AUTO: 0.91 X10(3) UL (ref 0–0.7)
EOSINOPHIL NFR BLD AUTO: 9.2 %
ERYTHROCYTE [DISTWIDTH] IN BLOOD BY AUTOMATED COUNT: 15.2 %
EST. AVERAGE GLUCOSE BLD GHB EST-MCNC: 128 MG/DL (ref 68–126)
FASTING PATIENT LIPID ANSWER: YES
FASTING STATUS PATIENT QL REPORTED: YES
GFR SERPLBLD BASED ON 1.73 SQ M-ARVRAT: 97 ML/MIN/1.73M2 (ref 60–?)
GLOBULIN PLAS-MCNC: 3.6 G/DL (ref 2.8–4.4)
GLUCOSE BLD-MCNC: 103 MG/DL (ref 70–99)
HBA1C MFR BLD: 6.1 % (ref ?–5.7)
HCT VFR BLD AUTO: 43.8 %
HDLC SERPL-MCNC: 33 MG/DL (ref 40–59)
HGB BLD-MCNC: 14.2 G/DL
IMM GRANULOCYTES # BLD AUTO: 0.03 X10(3) UL (ref 0–1)
IMM GRANULOCYTES NFR BLD: 0.3 %
LDLC SERPL CALC-MCNC: 88 MG/DL (ref ?–100)
LYMPHOCYTES # BLD AUTO: 2.99 X10(3) UL (ref 1–4)
LYMPHOCYTES NFR BLD AUTO: 30.1 %
MCH RBC QN AUTO: 26.4 PG (ref 26–34)
MCHC RBC AUTO-ENTMCNC: 32.4 G/DL (ref 31–37)
MCV RBC AUTO: 81.4 FL
MONOCYTES # BLD AUTO: 0.88 X10(3) UL (ref 0.1–1)
MONOCYTES NFR BLD AUTO: 8.9 %
NEUTROPHILS # BLD AUTO: 5.04 X10 (3) UL (ref 1.5–7.7)
NEUTROPHILS # BLD AUTO: 5.04 X10(3) UL (ref 1.5–7.7)
NEUTROPHILS NFR BLD AUTO: 50.7 %
NONHDLC SERPL-MCNC: 137 MG/DL (ref ?–130)
OSMOLALITY SERPL CALC.SUM OF ELEC: 293 MOSM/KG (ref 275–295)
PLATELET # BLD AUTO: 222 10(3)UL (ref 150–450)
POTASSIUM SERPL-SCNC: 4.6 MMOL/L (ref 3.5–5.1)
PROT SERPL-MCNC: 7.5 G/DL (ref 6.4–8.2)
RBC # BLD AUTO: 5.38 X10(6)UL
SODIUM SERPL-SCNC: 141 MMOL/L (ref 136–145)
TRIGL SERPL-MCNC: 292 MG/DL (ref 30–149)
VLDLC SERPL CALC-MCNC: 48 MG/DL (ref 0–30)
WBC # BLD AUTO: 9.9 X10(3) UL (ref 4–11)

## 2023-02-15 PROCEDURE — 36415 COLL VENOUS BLD VENIPUNCTURE: CPT

## 2023-02-15 PROCEDURE — 84403 ASSAY OF TOTAL TESTOSTERONE: CPT

## 2023-02-15 PROCEDURE — 80053 COMPREHEN METABOLIC PANEL: CPT

## 2023-02-15 PROCEDURE — 84402 ASSAY OF FREE TESTOSTERONE: CPT

## 2023-02-15 PROCEDURE — 80061 LIPID PANEL: CPT

## 2023-02-15 PROCEDURE — 83036 HEMOGLOBIN GLYCOSYLATED A1C: CPT

## 2023-02-15 PROCEDURE — 85025 COMPLETE CBC W/AUTO DIFF WBC: CPT

## 2023-02-21 LAB
TESTOSTERONE TOTAL: 534.6 NG/DL
TESTOSTERONE, FREE -MS/MS: 97.6 PG/ML

## 2023-03-09 ENCOUNTER — OFFICE VISIT (OUTPATIENT)
Dept: GASTROENTEROLOGY | Facility: CLINIC | Age: 58
End: 2023-03-09

## 2023-03-09 VITALS
SYSTOLIC BLOOD PRESSURE: 142 MMHG | HEIGHT: 63 IN | DIASTOLIC BLOOD PRESSURE: 86 MMHG | BODY MASS INDEX: 31.15 KG/M2 | WEIGHT: 175.81 LBS | HEART RATE: 86 BPM

## 2023-03-09 DIAGNOSIS — R14.0 ABDOMINAL BLOATING: Primary | ICD-10-CM

## 2023-03-09 DIAGNOSIS — R14.0 ABDOMINAL DISTENTION: ICD-10-CM

## 2023-03-09 PROCEDURE — 3077F SYST BP >= 140 MM HG: CPT | Performed by: INTERNAL MEDICINE

## 2023-03-09 PROCEDURE — 3079F DIAST BP 80-89 MM HG: CPT | Performed by: INTERNAL MEDICINE

## 2023-03-09 PROCEDURE — 3008F BODY MASS INDEX DOCD: CPT | Performed by: INTERNAL MEDICINE

## 2023-03-09 PROCEDURE — 99204 OFFICE O/P NEW MOD 45 MIN: CPT | Performed by: INTERNAL MEDICINE

## 2023-03-09 RX ORDER — SULFAMETHOXAZOLE AND TRIMETHOPRIM 800; 160 MG/1; MG/1
1 TABLET ORAL 2 TIMES DAILY
Qty: 10 TABLET | Refills: 0 | COMMUNITY
Start: 2023-03-08 | End: 2023-03-13

## 2023-03-09 NOTE — PATIENT INSTRUCTIONS
WASHOUT INSTRUCTIONS:  1. Pick a day you will be at home, close to a toilet. 2. Have a light breakfast that AM: small amount of eggs or 1 slice of toast  3. Around 10AM start drinking the solution prescribed (polyethylene glycol) drink over the course of 3-5 hours. IF having nausea/bloating/disention, take a break for 30 minutes, walk around and then resume drinking. If vomiting, take a break for 1 hour, if symptoms improve, and you feel well, can resume drinking. 4. Goal is to finish solution or until stools turn liquid yellow. 5. For lunch you should have a liquid diet (7up, water, gingerale, etc)  6. After prep, for dinner you can have a light dinner. 7. Resume regular meals the following day  8. You should continue your regular medications during the washout day. 9. Start a fiber supplement following the above about 2-3 days after the washout like fibercon or citrucel    Tests  Complete your stool and blood tests at the lab  2.  To accurately test for h.pylori it is best if possible to be off your omeprazole for 2 weeks prior

## 2023-03-15 ENCOUNTER — LAB ENCOUNTER (OUTPATIENT)
Dept: LAB | Age: 58
End: 2023-03-15
Attending: INTERNAL MEDICINE
Payer: MEDICAID

## 2023-03-15 DIAGNOSIS — R14.0 ABDOMINAL BLOATING: ICD-10-CM

## 2023-03-15 DIAGNOSIS — R14.0 ABDOMINAL DISTENTION: ICD-10-CM

## 2023-03-15 LAB — IGA SERPL-MCNC: 130 MG/DL (ref 70–312)

## 2023-03-15 PROCEDURE — 36415 COLL VENOUS BLD VENIPUNCTURE: CPT

## 2023-03-15 PROCEDURE — 86364 TISS TRNSGLTMNASE EA IG CLAS: CPT

## 2023-03-15 PROCEDURE — 82784 ASSAY IGA/IGD/IGG/IGM EACH: CPT

## 2023-03-16 ENCOUNTER — TELEPHONE (OUTPATIENT)
Dept: INTERNAL MEDICINE CLINIC | Facility: CLINIC | Age: 58
End: 2023-03-16

## 2023-03-16 DIAGNOSIS — Z12.11 SCREENING FOR COLON CANCER: Primary | ICD-10-CM

## 2023-03-16 LAB — TTG IGA SER-ACNC: <0.2 U/ML (ref ?–7)

## 2023-03-18 ENCOUNTER — LAB ENCOUNTER (OUTPATIENT)
Dept: LAB | Age: 58
End: 2023-03-18
Attending: INTERNAL MEDICINE
Payer: MEDICAID

## 2023-03-18 DIAGNOSIS — R14.0 ABDOMINAL DISTENTION: ICD-10-CM

## 2023-03-18 DIAGNOSIS — R14.0 ABDOMINAL BLOATING: ICD-10-CM

## 2023-03-18 DIAGNOSIS — Z12.11 SCREENING FOR COLON CANCER: ICD-10-CM

## 2023-03-18 PROCEDURE — 87338 HPYLORI STOOL AG IA: CPT

## 2023-03-18 PROCEDURE — 83993 ASSAY FOR CALPROTECTIN FECAL: CPT

## 2023-03-18 PROCEDURE — 82274 ASSAY TEST FOR BLOOD FECAL: CPT

## 2023-03-20 LAB
CALPROTECTIN STL-MCNT: 39.1 ΜG/G (ref ?–50)
H PYLORI AG STL QL IA: NEGATIVE

## 2023-03-20 RX ORDER — SPIRONOLACTONE 100 MG/1
TABLET, FILM COATED ORAL
Qty: 90 TABLET | Refills: 3 | Status: SHIPPED | OUTPATIENT
Start: 2023-03-20

## 2023-03-20 NOTE — TELEPHONE ENCOUNTER
Protocol passed     Requesting: spironolactone 100mg     LOV: 12/14/22   RTC: none noted   Filled: 4/20/22 # 90 3 refills   Recent Labs: 9/28/22     Upcoming OV: none scheduled

## 2023-03-23 ENCOUNTER — LAB ENCOUNTER (OUTPATIENT)
Dept: LAB | Age: 58
End: 2023-03-23
Attending: INTERNAL MEDICINE
Payer: MEDICAID

## 2023-03-23 DIAGNOSIS — R14.0 ABDOMINAL BLOATING: ICD-10-CM

## 2023-03-23 DIAGNOSIS — Z12.11 SCREENING FOR COLON CANCER: ICD-10-CM

## 2023-03-23 DIAGNOSIS — R14.0 ABDOMINAL DISTENTION: ICD-10-CM

## 2023-03-23 LAB — HEMOCCULT STL QL: NEGATIVE

## 2023-03-23 PROCEDURE — 82274 ASSAY TEST FOR BLOOD FECAL: CPT

## 2023-04-05 ENCOUNTER — PATIENT MESSAGE (OUTPATIENT)
Dept: INTERNAL MEDICINE CLINIC | Facility: CLINIC | Age: 58
End: 2023-04-05

## 2023-04-06 NOTE — TELEPHONE ENCOUNTER
DELONTE JI    Spoke to pt, voice quite obviously scratchy from coughing which was heard at the beginning of the call. Pt states he was a little bit ill before he and wife went to [facility], but has not had a fever, COVID test was negative. Pt stated he works in an [employer] so gets exposed to a lot of stuff. Pt stated his wife his an albuterol inhaler that he did try once, but it did not help at all. Encouraged pt to proceed to UC or ED if his breathing worsens between now and appt with LS tomorrow at noon. Pt v/u and thanked us for the call.

## 2023-04-06 NOTE — TELEPHONE ENCOUNTER
From: Hans Faria  To: Mis Toth MD  Sent: 4/5/2023 3:07 PM CDT  Subject: Severe cough and shortness of breath    Jennifer Mcknight,  We traveled to [facility] on March 27th for a short vacation but on our second day there, my throat started to hurt, I was having difficulty breathing and my nose was running. I did have some Tylenol with me and an antibiotic from Dr. Tahmina Mirza after the cystoscopy that I was taking but things did not improve. My throat does not hurt as much but my head and chest feel stuffy, making several steps leaves me out of breath, and I am struggling to breath. Came home today and immediately made an appointment to see you on Monday which was the first one available. Please let me know if you need me to complete some tests prior to the appointment. Thank you.   Harry Li

## 2023-04-07 ENCOUNTER — TELEMEDICINE (OUTPATIENT)
Dept: INTERNAL MEDICINE CLINIC | Facility: CLINIC | Age: 58
End: 2023-04-07
Payer: MEDICAID

## 2023-04-07 ENCOUNTER — HOSPITAL ENCOUNTER (OUTPATIENT)
Dept: GENERAL RADIOLOGY | Age: 58
Discharge: HOME | End: 2023-04-07
Attending: INTERNAL MEDICINE
Payer: MEDICAID

## 2023-04-07 DIAGNOSIS — R05.1 ACUTE COUGH: ICD-10-CM

## 2023-04-07 DIAGNOSIS — R05.1 ACUTE COUGH: Primary | ICD-10-CM

## 2023-04-07 PROCEDURE — 71046 X-RAY EXAM CHEST 2 VIEWS: CPT | Performed by: INTERNAL MEDICINE

## 2023-04-07 RX ORDER — BENZONATATE 200 MG/1
200 CAPSULE ORAL 3 TIMES DAILY PRN
Qty: 30 CAPSULE | Refills: 0 | Status: SHIPPED | OUTPATIENT
Start: 2023-04-07

## 2023-04-07 RX ORDER — METHYLPREDNISOLONE 4 MG/1
TABLET ORAL
Qty: 1 EACH | Refills: 0 | Status: SHIPPED | OUTPATIENT
Start: 2023-04-07

## 2023-05-05 RX ORDER — SPIRONOLACTONE 100 MG/1
100 TABLET, FILM COATED ORAL DAILY
Qty: 90 TABLET | Refills: 3 | OUTPATIENT
Start: 2023-05-05

## 2023-05-15 RX ORDER — LOSARTAN POTASSIUM 25 MG/1
TABLET ORAL
Qty: 90 TABLET | Refills: 3 | Status: SHIPPED | OUTPATIENT
Start: 2023-05-15

## 2023-06-13 RX ORDER — ROSUVASTATIN CALCIUM 20 MG/1
TABLET, COATED ORAL
Qty: 90 TABLET | Refills: 0 | Status: SHIPPED | OUTPATIENT
Start: 2023-06-13

## 2023-06-23 RX ORDER — ROSUVASTATIN CALCIUM 20 MG/1
TABLET, COATED ORAL
Qty: 90 TABLET | Refills: 0 | OUTPATIENT
Start: 2023-06-23

## 2023-08-14 RX ORDER — TESTOSTERONE CYPIONATE 200 MG/ML
300 INJECTION, SOLUTION INTRAMUSCULAR
Qty: 10 ML | Refills: 1 | Status: SHIPPED | OUTPATIENT
Start: 2023-08-14

## 2023-08-29 ENCOUNTER — TELEPHONE (OUTPATIENT)
Dept: INTERNAL MEDICINE CLINIC | Facility: CLINIC | Age: 58
End: 2023-08-29

## 2023-09-14 ENCOUNTER — TELEPHONE (OUTPATIENT)
Dept: ENDOCRINOLOGY CLINIC | Facility: CLINIC | Age: 58
End: 2023-09-14

## 2023-09-14 NOTE — TELEPHONE ENCOUNTER
Pt is scheduled for a follow up on 9/18. Pt tested positive for Covid today. He does not want to reschedule appointment, he wants the appointment changed to a virtual visit. Pt states he needs surgical clearance, surgery is scheduled for 11/2/23.     Please call

## 2023-09-18 ENCOUNTER — TELEMEDICINE (OUTPATIENT)
Dept: ENDOCRINOLOGY CLINIC | Facility: CLINIC | Age: 58
End: 2023-09-18

## 2023-09-18 ENCOUNTER — PATIENT MESSAGE (OUTPATIENT)
Dept: ENDOCRINOLOGY CLINIC | Facility: CLINIC | Age: 58
End: 2023-09-18

## 2023-09-18 DIAGNOSIS — Z78.9 FEMALE-TO-MALE TRANSGENDER PERSON: Primary | ICD-10-CM

## 2023-09-18 DIAGNOSIS — E27.40 ADRENAL INSUFFICIENCY (HCC): ICD-10-CM

## 2023-09-18 RX ORDER — SPIRONOLACTONE 100 MG/1
100 TABLET, FILM COATED ORAL DAILY
Qty: 90 TABLET | Refills: 3 | Status: SHIPPED | OUTPATIENT
Start: 2023-09-18

## 2023-09-18 RX ORDER — TESTOSTERONE CYPIONATE 200 MG/ML
300 INJECTION, SOLUTION INTRAMUSCULAR
Qty: 10 ML | Refills: 1 | Status: SHIPPED | OUTPATIENT
Start: 2023-09-18

## 2023-09-18 RX ORDER — SYRINGE WITH NEEDLE, 1 ML 25GX5/8"
SYRINGE, EMPTY DISPOSABLE MISCELLANEOUS
Qty: 50 EACH | Refills: 0 | Status: SHIPPED | OUTPATIENT
Start: 2023-09-18

## 2023-09-19 NOTE — TELEPHONE ENCOUNTER
From: Anna Antonio  To: Osmani Dow  Sent: 9/18/2023 11:16 AM CDT  Subject: Neptali Garcia,  The surgery will be done by Dr. Max Puga at [facility]. His office phone : [phone] and their Fax: [phone]. You could also send your recommendations via [address] Thank you so much!   Maynor Robles

## 2023-09-20 ENCOUNTER — OFFICE VISIT (OUTPATIENT)
Dept: INTERNAL MEDICINE CLINIC | Facility: CLINIC | Age: 58
End: 2023-09-20
Payer: MEDICAID

## 2023-09-20 VITALS
SYSTOLIC BLOOD PRESSURE: 118 MMHG | BODY MASS INDEX: 31.18 KG/M2 | RESPIRATION RATE: 16 BRPM | TEMPERATURE: 98 F | HEART RATE: 68 BPM | HEIGHT: 63 IN | OXYGEN SATURATION: 99 % | DIASTOLIC BLOOD PRESSURE: 68 MMHG | WEIGHT: 176 LBS

## 2023-09-20 DIAGNOSIS — E78.2 MIXED HYPERLIPIDEMIA: ICD-10-CM

## 2023-09-20 DIAGNOSIS — Z78.9 FEMALE-TO-MALE TRANSGENDER PERSON: ICD-10-CM

## 2023-09-20 DIAGNOSIS — Z01.810 PREOP CARDIOVASCULAR EXAM: Primary | ICD-10-CM

## 2023-09-20 DIAGNOSIS — I10 ESSENTIAL HYPERTENSION: ICD-10-CM

## 2023-09-20 DIAGNOSIS — Z01.818 PRE-OP TESTING: ICD-10-CM

## 2023-09-20 DIAGNOSIS — R05.3 CHRONIC COUGH: ICD-10-CM

## 2023-09-20 DIAGNOSIS — R73.01 ELEVATED FASTING GLUCOSE: ICD-10-CM

## 2023-09-20 PROCEDURE — 3078F DIAST BP <80 MM HG: CPT | Performed by: INTERNAL MEDICINE

## 2023-09-20 PROCEDURE — 93000 ELECTROCARDIOGRAM COMPLETE: CPT | Performed by: INTERNAL MEDICINE

## 2023-09-20 PROCEDURE — 99215 OFFICE O/P EST HI 40 MIN: CPT | Performed by: INTERNAL MEDICINE

## 2023-09-20 PROCEDURE — 3008F BODY MASS INDEX DOCD: CPT | Performed by: INTERNAL MEDICINE

## 2023-09-20 PROCEDURE — 3074F SYST BP LT 130 MM HG: CPT | Performed by: INTERNAL MEDICINE

## 2023-09-20 RX ORDER — ROSUVASTATIN CALCIUM 20 MG/1
20 TABLET, COATED ORAL EVERY MORNING
Qty: 90 TABLET | Refills: 3 | Status: SHIPPED | OUTPATIENT
Start: 2023-09-20

## 2023-09-20 RX ORDER — IBUPROFEN 600 MG/1
600 TABLET ORAL EVERY 8 HOURS PRN
Qty: 90 TABLET | Refills: 3 | Status: SHIPPED | OUTPATIENT
Start: 2023-09-20

## 2023-09-20 RX ORDER — BENZONATATE 200 MG/1
200 CAPSULE ORAL 3 TIMES DAILY PRN
Qty: 30 CAPSULE | Refills: 3 | Status: SHIPPED | OUTPATIENT
Start: 2023-09-20

## 2023-09-20 RX ORDER — OXYBUTYNIN CHLORIDE 5 MG/1
5 TABLET, EXTENDED RELEASE ORAL DAILY
COMMUNITY
Start: 2023-08-15

## 2023-09-20 RX ORDER — FLUTICASONE PROPIONATE 50 MCG
2 SPRAY, SUSPENSION (ML) NASAL DAILY
Qty: 1 EACH | Refills: 2 | Status: SHIPPED | OUTPATIENT
Start: 2023-09-20 | End: 2023-12-19

## 2023-09-20 RX ORDER — SPIRONOLACTONE 100 MG/1
100 TABLET, FILM COATED ORAL DAILY
Qty: 90 TABLET | Refills: 3 | Status: SHIPPED | OUTPATIENT
Start: 2023-09-20

## 2023-09-21 NOTE — TELEPHONE ENCOUNTER
Please send letter to surgeon that recommend Hydrocortisone 100mg IV x1 to be given in OR by anesthesia before start of surgery. Thanks.

## 2023-09-21 NOTE — TELEPHONE ENCOUNTER
Letter formulated and faxed to patient's surgeon via [facility] [phone]. Patient has also been Cc'd.

## 2023-09-22 ENCOUNTER — TELEPHONE (OUTPATIENT)
Dept: INTERNAL MEDICINE CLINIC | Facility: CLINIC | Age: 58
End: 2023-09-22

## 2023-09-22 LAB
ATRIAL RATE: 68 BPM
P AXIS: 64 DEGREES
P-R INTERVAL: 136 MS
Q-T INTERVAL: 378 MS
QRS DURATION: 84 MS
QTC CALCULATION (BEZET): 401 MS
R AXIS: 50 DEGREES
T AXIS: -3 DEGREES
VENTRICULAR RATE: 68 BPM

## 2023-09-22 NOTE — TELEPHONE ENCOUNTER
H&P and EKG faxed to Dr. Scarlett Regalado office   Confirmation received   Placed in [place] [id number]

## 2023-09-27 ENCOUNTER — TELEPHONE (OUTPATIENT)
Dept: ENDOCRINOLOGY CLINIC | Facility: CLINIC | Age: 58
End: 2023-09-27

## 2023-09-27 NOTE — TELEPHONE ENCOUNTER
PA is expiring     Current Outpatient Medications   Medication Sig Dispense Refill    testosterone cypionate 200 mg/mL Intramuscular Solution Inject 1.5 mL (300 mg total) into the muscle every 14 (fourteen) days.  10 mL 1     Key # [id number]

## 2023-09-27 NOTE — TELEPHONE ENCOUNTER
RN submitted PA via surescripts:    Case Id  [id number]  Reference Id  [id number]

## 2023-09-28 NOTE — TELEPHONE ENCOUNTER
Received fax from [facility] dated on 09/27 stating for the PA has been filed but they are requesting chart notes as well, faxing LOV 09/18/23 to [phone].   Awaiting confirmation

## 2023-10-02 ENCOUNTER — LAB ENCOUNTER (OUTPATIENT)
Dept: LAB | Age: 58
End: 2023-10-02
Attending: INTERNAL MEDICINE
Payer: MEDICAID

## 2023-10-02 DIAGNOSIS — Z78.9 FEMALE-TO-MALE TRANSGENDER PERSON: ICD-10-CM

## 2023-10-02 DIAGNOSIS — E27.40 ADRENAL INSUFFICIENCY (HCC): ICD-10-CM

## 2023-10-02 LAB
ALBUMIN SERPL-MCNC: 4 G/DL (ref 3.4–5)
ALBUMIN/GLOB SERPL: 1.1 {RATIO} (ref 1–2)
ALP LIVER SERPL-CCNC: 78 U/L
ALT SERPL-CCNC: 31 U/L
ANION GAP SERPL CALC-SCNC: 5 MMOL/L (ref 0–18)
AST SERPL-CCNC: 20 U/L (ref 15–37)
BILIRUB SERPL-MCNC: 0.3 MG/DL (ref 0.1–2)
BUN BLD-MCNC: 13 MG/DL (ref 7–18)
CALCIUM BLD-MCNC: 9.6 MG/DL (ref 8.5–10.1)
CHLORIDE SERPL-SCNC: 109 MMOL/L (ref 98–112)
CO2 SERPL-SCNC: 24 MMOL/L (ref 21–32)
CORTIS SERPL-MCNC: 10.6 UG/DL
CREAT BLD-MCNC: 1.03 MG/DL
EGFRCR SERPLBLD CKD-EPI 2021: 84 ML/MIN/1.73M2 (ref 60–?)
ESTRADIOL SERPL-MCNC: 16.2 PG/ML
FASTING STATUS PATIENT QL REPORTED: NO
GLOBULIN PLAS-MCNC: 3.7 G/DL (ref 2.8–4.4)
GLUCOSE BLD-MCNC: 97 MG/DL (ref 70–99)
OSMOLALITY SERPL CALC.SUM OF ELEC: 286 MOSM/KG (ref 275–295)
POTASSIUM SERPL-SCNC: 4 MMOL/L (ref 3.5–5.1)
PROT SERPL-MCNC: 7.7 G/DL (ref 6.4–8.2)
SODIUM SERPL-SCNC: 138 MMOL/L (ref 136–145)
T4 FREE SERPL-MCNC: 0.8 NG/DL (ref 0.8–1.7)
TSI SER-ACNC: 2.86 MIU/ML (ref 0.36–3.74)

## 2023-10-02 PROCEDURE — 84410 TESTOSTERONE BIOAVAILABLE: CPT

## 2023-10-02 PROCEDURE — 84439 ASSAY OF FREE THYROXINE: CPT

## 2023-10-02 PROCEDURE — 36415 COLL VENOUS BLD VENIPUNCTURE: CPT

## 2023-10-02 PROCEDURE — 85025 COMPLETE CBC W/AUTO DIFF WBC: CPT | Performed by: INTERNAL MEDICINE

## 2023-10-02 PROCEDURE — 82670 ASSAY OF TOTAL ESTRADIOL: CPT

## 2023-10-02 PROCEDURE — 82533 TOTAL CORTISOL: CPT

## 2023-10-02 PROCEDURE — 83036 HEMOGLOBIN GLYCOSYLATED A1C: CPT | Performed by: INTERNAL MEDICINE

## 2023-10-02 PROCEDURE — 84443 ASSAY THYROID STIM HORMONE: CPT

## 2023-10-02 PROCEDURE — 80053 COMPREHEN METABOLIC PANEL: CPT

## 2023-10-02 PROCEDURE — 81003 URINALYSIS AUTO W/O SCOPE: CPT | Performed by: INTERNAL MEDICINE

## 2023-10-05 ENCOUNTER — TELEPHONE (OUTPATIENT)
Dept: ENDOCRINOLOGY CLINIC | Facility: CLINIC | Age: 58
End: 2023-10-05

## 2023-10-05 DIAGNOSIS — E78.00 ELEVATED CHOLESTEROL: Primary | ICD-10-CM

## 2023-10-05 LAB
SEX HORM BIND GLOB: 26.2 NMOL/L
TESTOSTERONE TOT /MS: 600.3 NG/DL

## 2023-10-05 NOTE — TELEPHONE ENCOUNTER
I called the lab, they will post the total T level. Unfortunately they cannot add on a lipid panel, they do not have the blood in the correct tube for that. Would you like the patient to return to the lab for a fasting lipid panel? Order pending.

## 2023-10-05 NOTE — TELEPHONE ENCOUNTER
Received call from Yolanda in the lab (ext [phone]).   Currently a testosterone free and total is ordered.   Patient's blood sample is too lipemic for them to run a free testosterone level, however they have the total testosterone level, have not posted results yet.     Lab would like to know if you would like the patient to come back to try an provide another sample or if you would like them to post the total testosterone level. Also, I'm thinking if his blood is very lipemic, would you like to run a cholesterol level as well?     LOV with you 9/18/23 telemedicine for female to male transgender, adrenal insufficiency

## 2023-10-19 ENCOUNTER — LAB ENCOUNTER (OUTPATIENT)
Dept: LAB | Age: 58
End: 2023-10-19
Attending: INTERNAL MEDICINE
Payer: MEDICAID

## 2023-10-19 DIAGNOSIS — E78.00 ELEVATED CHOLESTEROL: ICD-10-CM

## 2023-10-19 LAB
CHOLEST SERPL-MCNC: 161 MG/DL (ref ?–200)
FASTING PATIENT LIPID ANSWER: YES
HDLC SERPL-MCNC: 38 MG/DL (ref 40–59)
LDLC SERPL CALC-MCNC: 79 MG/DL (ref ?–100)
NONHDLC SERPL-MCNC: 123 MG/DL (ref ?–130)
TRIGL SERPL-MCNC: 271 MG/DL (ref 30–149)
VLDLC SERPL CALC-MCNC: 43 MG/DL (ref 0–30)

## 2023-10-19 PROCEDURE — 36415 COLL VENOUS BLD VENIPUNCTURE: CPT

## 2023-10-19 PROCEDURE — 80061 LIPID PANEL: CPT

## 2023-10-28 DIAGNOSIS — Z78.9 FEMALE-TO-MALE TRANSGENDER PERSON: ICD-10-CM

## 2023-10-29 ENCOUNTER — IMMUNIZATION (OUTPATIENT)
Dept: LAB | Age: 58
End: 2023-10-29
Attending: EMERGENCY MEDICINE

## 2023-10-29 DIAGNOSIS — Z23 NEED FOR VACCINATION: Primary | ICD-10-CM

## 2023-10-29 PROCEDURE — 90480 ADMN SARSCOV2 VAC 1/ONLY CMP: CPT

## 2023-10-30 RX ORDER — BUDESONIDE AND FORMOTEROL FUMARATE DIHYDRATE 160; 4.5 UG/1; UG/1
2 AEROSOL RESPIRATORY (INHALATION) 2 TIMES DAILY
Qty: 3 EACH | Refills: 3 | Status: SHIPPED | OUTPATIENT
Start: 2023-10-30

## 2023-10-30 RX ORDER — TESTOSTERONE CYPIONATE 200 MG/ML
300 INJECTION, SOLUTION INTRAMUSCULAR
Qty: 10 ML | Refills: 1 | Status: SHIPPED | OUTPATIENT
Start: 2023-10-30

## 2023-10-30 RX ORDER — OMEPRAZOLE 40 MG/1
40 CAPSULE, DELAYED RELEASE ORAL DAILY
Qty: 90 CAPSULE | Refills: 3 | Status: SHIPPED | OUTPATIENT
Start: 2023-10-30

## 2023-10-30 RX ORDER — SYRINGE WITH NEEDLE, 1 ML 25GX5/8"
SYRINGE, EMPTY DISPOSABLE MISCELLANEOUS
Qty: 50 EACH | Refills: 0 | Status: SHIPPED | OUTPATIENT
Start: 2023-10-30

## 2023-10-30 RX ORDER — SPIRONOLACTONE 100 MG/1
100 TABLET, FILM COATED ORAL DAILY
Qty: 90 TABLET | Refills: 3 | OUTPATIENT
Start: 2023-10-30

## 2023-10-30 RX ORDER — LOSARTAN POTASSIUM 25 MG/1
25 TABLET ORAL DAILY
Qty: 90 TABLET | Refills: 3 | OUTPATIENT
Start: 2023-10-30

## 2023-10-30 RX ORDER — FLUTICASONE PROPIONATE 50 MCG
2 SPRAY, SUSPENSION (ML) NASAL DAILY
Qty: 16 G | Refills: 3 | Status: SHIPPED | OUTPATIENT
Start: 2023-10-30 | End: 2024-01-28

## 2023-10-30 RX ORDER — BENZONATATE 200 MG/1
200 CAPSULE ORAL 3 TIMES DAILY PRN
Qty: 30 CAPSULE | Refills: 3 | Status: SHIPPED | OUTPATIENT
Start: 2023-10-30

## 2023-10-30 NOTE — TELEPHONE ENCOUNTER
LOV: 09/18/2023    RTC: 6  Months    FU: 01/04/2024    Last Refill: 09/18/2023    3 Month Supply Pending      Please approve if applicable

## 2023-10-30 NOTE — TELEPHONE ENCOUNTER
Protocol passed   Spironolactone 100mg filled: 9/20/23 # 90 3 refills   Losartan 25mg filled: 5/15/23 # 90 3 refills   Fluticasone propionate 50mcg/act filled 9/20/23 # 1 2 refills     Protocol failed   Symbicort 160-4.5mcg/act filled: 12/14/22 # 3 refills     No protocol   Omeprazole 40mg filled 12/14/22 # 90 3 refills   Benzonate 200mg filled: 9/20/23 # 30 3 refills     LOV:  9/20/23   RTC: none noted   Labs: 10/2/23   No future appointments.

## 2023-12-01 RX ORDER — OMEPRAZOLE 40 MG/1
40 CAPSULE, DELAYED RELEASE ORAL DAILY
Qty: 90 CAPSULE | Refills: 3 | OUTPATIENT
Start: 2023-12-01

## 2023-12-01 RX ORDER — IBUPROFEN 600 MG/1
600 TABLET ORAL EVERY 8 HOURS PRN
Qty: 90 TABLET | Refills: 3 | OUTPATIENT
Start: 2023-12-01

## 2023-12-01 NOTE — TELEPHONE ENCOUNTER
Failed protocol     Last refill:  Medication Quantity Refills Start End   ibuprofen 600 MG Oral Tab 90 tablet 3 9/20/2023    Sig:   Take 1 tablet (600 mg total) by mouth every 8 (eight) hours as needed for Pain. Medication Quantity Refills Start End   Omeprazole 40 MG Oral Capsule Delayed Release 90 capsule 3 10/30/2023    Sig:   Take 1 capsule (40 mg total) by mouth daily.        LOV:   09/20/2023 Dr Sharron Rodríguez RT  FOV scheduled 01/05/2024

## 2024-01-05 ENCOUNTER — OFFICE VISIT (OUTPATIENT)
Dept: INTERNAL MEDICINE CLINIC | Facility: CLINIC | Age: 59
End: 2024-01-05
Payer: MEDICAID

## 2024-01-05 VITALS
HEIGHT: 63 IN | WEIGHT: 177 LBS | OXYGEN SATURATION: 97 % | HEART RATE: 85 BPM | RESPIRATION RATE: 16 BRPM | SYSTOLIC BLOOD PRESSURE: 128 MMHG | TEMPERATURE: 97 F | BODY MASS INDEX: 31.36 KG/M2 | DIASTOLIC BLOOD PRESSURE: 80 MMHG

## 2024-01-05 DIAGNOSIS — E78.2 MIXED HYPERLIPIDEMIA: ICD-10-CM

## 2024-01-05 DIAGNOSIS — Z12.11 SCREENING FOR COLON CANCER: ICD-10-CM

## 2024-01-05 DIAGNOSIS — Z78.9 FEMALE-TO-MALE TRANSGENDER PERSON: ICD-10-CM

## 2024-01-05 DIAGNOSIS — C67.2 CANCER OF LATERAL WALL OF URINARY BLADDER (HCC): ICD-10-CM

## 2024-01-05 DIAGNOSIS — R73.01 ELEVATED FASTING GLUCOSE: ICD-10-CM

## 2024-01-05 DIAGNOSIS — I70.0 ATHEROSCLEROSIS OF AORTA (HCC): ICD-10-CM

## 2024-01-05 DIAGNOSIS — M65.319 TRIGGER FINGER OF THUMB, UNSPECIFIED LATERALITY: ICD-10-CM

## 2024-01-05 DIAGNOSIS — I10 ESSENTIAL HYPERTENSION: ICD-10-CM

## 2024-01-05 DIAGNOSIS — Z00.00 ENCOUNTER FOR ROUTINE ADULT MEDICAL EXAMINATION: Primary | ICD-10-CM

## 2024-01-05 PROCEDURE — 3008F BODY MASS INDEX DOCD: CPT | Performed by: INTERNAL MEDICINE

## 2024-01-05 PROCEDURE — 99396 PREV VISIT EST AGE 40-64: CPT | Performed by: INTERNAL MEDICINE

## 2024-01-05 PROCEDURE — 3074F SYST BP LT 130 MM HG: CPT | Performed by: INTERNAL MEDICINE

## 2024-01-05 PROCEDURE — 3079F DIAST BP 80-89 MM HG: CPT | Performed by: INTERNAL MEDICINE

## 2024-01-05 RX ORDER — HYDROCODONE BITARTRATE AND ACETAMINOPHEN 5; 325 MG/1; MG/1
1 TABLET ORAL EVERY 6 HOURS PRN
COMMUNITY
Start: 2023-11-17

## 2024-01-05 RX ORDER — ACETAMINOPHEN 500 MG
1 TABLET ORAL EVERY 6 HOURS PRN
COMMUNITY
Start: 2023-11-17

## 2024-01-05 RX ORDER — DOCUSATE SODIUM 100 MG/1
100 CAPSULE, LIQUID FILLED ORAL 2 TIMES DAILY
Qty: 90 CAPSULE | Refills: 1 | Status: SHIPPED | OUTPATIENT
Start: 2024-01-05

## 2024-01-05 RX ORDER — DOCUSATE SODIUM 100 MG/1
100 CAPSULE, LIQUID FILLED ORAL DAILY
COMMUNITY

## 2024-01-05 NOTE — PROGRESS NOTES
[facility] Internal Medicine Office Note  Chief Complaint:   Chief Complaint   Patient presents with    Physical       HPI:   This is a 58 year old adult coming in for physical  HPI    Pain in bilateral thumbs and  strength.   Started on the left and 2-3 months     Declines Tdap and Shingrix     He is interested in weight loss medications    He follows with Dr. Griffiths for bladder cancer     He had metoidioplasty surgery with Dr. Tate in 2023. He has had urinary issues of urinary urgency since surgery    Requests refill for docusate which he has been taking since surgery    Patient Active Problem List   Diagnosis    Mixed hyperlipidemia    Essential hypertension    Adrenal insufficiency (Earnest's disease) (HCC)    Female-to-male transgender person    Atherosclerosis of aorta (HCC)    Pre-diabetes    Cancer of lateral wall of urinary bladder (HCC)    Hx of endometriosis    Pelvic pain    Uterine mass    Endometrial mass    Status post hysterectomy with oophorectomy    Memory change     Past Surgical History:   Procedure Laterality Date    Cholecystectomy      [place], [place]    Cystourethroscopy  2021    Cystoscopy Dr. Griffiths    Cystourethroscopy,fulgur 2-5cm lesn  01/10/2022    TURBT with 2g Gemcitabine instill post op    Hysterectomy      Oophorectomy Bilateral 2022    BSO at time of hyst    Total abdominal hysterectomy  2022    GEN/BSO, lysis of adhesions for endometrial mass. Dr. Shyla Craft, [facility]     Family History   Problem Relation Age of Onset    Stroke Father 61    Stroke Paternal Grandfather     Other (Other) Mother         CKD    Pancreatic Cancer Paternal Grandmother         I reviewed his's Past Medical History, Past Surgical History, Family History and   Social History updated shows  Social History     Socioeconomic History    Marital status:    Tobacco Use    Smoking status: Former     Types: Cigarettes     Start date: 2019    Smokeless  tobacco: Never    Tobacco comments:     hx of 1/4 PPD   Vaping Use    Vaping Use: Never used   Substance and Sexual Activity    Alcohol use: Not Currently     Comment: holidays only    Drug use: No    Sexual activity: Not Currently     Partners: Female   Other Topics Concern    Caffeine Concern Yes     Comment: 1 cup of coffee daily    Exercise Yes     Comment: 4x/week    Seat Belt Yes     Allergies:  No Known Allergies  Current Outpatient Medications   Medication Sig Dispense Refill    acetaminophen 500 MG Oral Tab Take 1 tablet (500 mg total) by mouth every 6 (six) hours as needed for Pain.      docusate sodium 100 MG Oral Cap Take 1 capsule (100 mg total) by mouth daily.      HYDROcodone-acetaminophen 5-325 MG Oral Tab Take 1 tablet by mouth every 6 (six) hours as needed for Pain.      docusate sodium 100 MG Oral Cap Take 1 capsule (100 mg total) by mouth 2 (two) times daily. 90 capsule 1    testosterone cypionate 200 mg/mL Intramuscular Solution Inject 1.5 mL (300 mg total) into the muscle every 14 (fourteen) days. 10 mL 1    Syringe/Needle, Disp, (BD LUER-AMBROCIO SYRINGE) 22G X 1\" 3 ML Does not apply Misc USE TO INJECT TESTOSTERONE EVERY 14 DAYS 50 each 0    Omeprazole 40 MG Oral Capsule Delayed Release Take 1 capsule (40 mg total) by mouth daily. 90 capsule 3    Budesonide-Formoterol Fumarate (SYMBICORT) 160-4.5 MCG/ACT Inhalation Aerosol Inhale 2 puffs into the lungs 2 (two) times daily. 3 each 3    benzonatate 200 MG Oral Cap Take 1 capsule (200 mg total) by mouth 3 (three) times daily as needed for cough. 30 capsule 3    fluticasone propionate 50 MCG/ACT Nasal Suspension 2 sprays by Each Nare route daily. 16 g 3    oxybutynin ER 5 MG Oral Tablet 24 Hr Take 1 tablet (5 mg total) by mouth daily.      ibuprofen 600 MG Oral Tab Take 1 tablet (600 mg total) by mouth every 8 (eight) hours as needed for Pain. 90 tablet 3    rosuvastatin 20 MG Oral Tab Take 1 tablet (20 mg total) by mouth every morning. 90 tablet 3     spironolactone 100 MG Oral Tab Take 1 tablet (100 mg total) by mouth daily. 90 tablet 3    LOSARTAN 25 MG Oral Tab Take 1 tablet by mouth daily. 90 tablet 3         REVIEW OF SYSTEMS:   Review of Systems   Constitutional:  Negative for fever.   HENT:  Negative for congestion.    Eyes:  Negative for visual disturbance.   Respiratory:  Negative for shortness of breath.    Cardiovascular:  Negative for chest pain.   Gastrointestinal:  Negative for constipation.   Genitourinary:  Negative for dysuria.   Neurological: Negative.    Hematological: Negative.    Psychiatric/Behavioral: Negative.          EXAM:   /80   Pulse 85   Temp 97 °F (36.1 °C) (Temporal)   Resp 16   Ht 5' 3\" (1.6 m)   Wt 177 lb (80.3 kg)   LMP  (LMP Unknown)   SpO2 97%   BMI 31.35 kg/m²  Estimated body mass index is 31.35 kg/m² as calculated from the following:    Height as of this encounter: 5' 3\" (1.6 m).    Weight as of this encounter: 177 lb (80.3 kg).   Vital signs reviewed. Appears stated age, well groomed.  Physical Exam  Vitals reviewed.   Constitutional:       General: He is not in acute distress.     Appearance: He is well-developed.   HENT:      Head: Normocephalic and atraumatic.      Right Ear: Tympanic membrane normal.      Left Ear: Tympanic membrane normal.   Eyes:      Conjunctiva/sclera: Conjunctivae normal.   Cardiovascular:      Rate and Rhythm: Normal rate and regular rhythm.      Heart sounds: Normal heart sounds.   Pulmonary:      Effort: Pulmonary effort is normal.      Breath sounds: Normal breath sounds.   Abdominal:      Palpations: Abdomen is soft.   Musculoskeletal:      Cervical back: Neck supple.      Right lower leg: No edema.      Left lower leg: No edema.   Lymphadenopathy:      Cervical: No cervical adenopathy.   Skin:     General: Skin is warm and dry.   Neurological:      General: No focal deficit present.      Mental Status: He is alert.   Psychiatric:         Mood and Affect: Mood normal.           ASSESSMENT AND PLAN:   Baudilio Mosqueda is a 58 year old adult with  1. Encounter for routine adult medical examination    2. Screening for colon cancer    3. Elevated fasting glucose    4. Trigger finger of thumb, unspecified laterality    5. Female-to-male transgender person    6. Essential hypertension    7. Mixed hyperlipidemia    8. Cancer of lateral wall of urinary bladder (HCC)    9. Atherosclerosis of aorta (HCC)          The plan is as follows  Baudilio was seen today for physical.    Diagnoses and all orders for this visit:    Encounter for routine adult medical examination  -due for colon ca screening in 3/2024  -s/p mastectomy and hysterectomy    Screening for colon cancer -check FIT test due end of March 2024  -     Occult Blood, Fecal, Immunoassay (Blue cards) [E]; Future    Elevated fasting glucose -check A1c; previously in pre-diabetes range  -     Hemoglobin A1C; Future    Trigger finger of thumb, unspecified laterality -follow up with ortho hand specialist   -     Ortho Referral - In Network    Female-to-male transgender person -on testosterone; per endo     Essential hypertension - at goal; cont present management     Mixed hyperlipidemia -cont statin; last lipid panel 10/2023 was at goal     Cancer of lateral wall of urinary bladder (HCC) -per urology    Atherosclerosis of aorta (HCC) -cont statin    Other orders  -     docusate sodium 100 MG Oral Cap; Take 1 capsule (100 mg total) by mouth 2 (two) times daily.    He is interested in medical weight loss. Recommend to check with your insurance about coverage for weight loss medications - Wegovy, Saxenda, Contrave.  Please schedule a dedicated weight loss appointment if interested in pursuing     Orders Placed This Encounter   Procedures    Hemoglobin A1C    Occult Blood, Fecal, Immunoassay (Blue cards) [E]       Meds & Refills for this Visit:  Requested Prescriptions     Signed Prescriptions Disp Refills    docusate sodium 100 MG Oral  Cap 90 capsule 1     Sig: Take 1 capsule (100 mg total) by mouth 2 (two) times daily.       Imaging & Consults:  ORTHOPEDIC - INTERNAL    Health Maintenance Due   Topic Date Due    DTaP,Tdap,and Td Vaccines (1 - Tdap) Never done    Zoster Vaccines (1 of 2) Never done    Influenza Vaccine (1) 10/01/2023    Annual Physical  12/14/2023    Colorectal Cancer Screening  03/23/2024     Patient/Caregiver Education: Patient/Caregiver Education: There are no barriers to learning. Medical education done. Outcome: Patient verbalizes understanding. Patient is notified to call with any questions, complications, allergies, or worsening or changing symptoms.  Patient is to call with any side effects or complications from the treatments as a result of today.     Ellie Pandey MD

## 2024-01-05 NOTE — PATIENT INSTRUCTIONS
Blood work     Stool testing due at end of March 2024    Follow up with Dr. Pepe     Check with your insurance about coverage for weight loss medications - Wegovy, Saxenda, Contrave.  Please schedule a dedicated weight loss appointment if interested in pursuing

## 2024-01-08 ENCOUNTER — PATIENT MESSAGE (OUTPATIENT)
Dept: ENDOCRINOLOGY CLINIC | Facility: CLINIC | Age: 59
End: 2024-01-08

## 2024-01-09 NOTE — TELEPHONE ENCOUNTER
Offered appt in march. [place]. Pt asked if he can be seen in February.  Please advise.

## 2024-01-09 NOTE — TELEPHONE ENCOUNTER
From: Baudilio Mosqueda  To: Arabella Olivier  Sent: 1/8/2024 10:15 AM CST  Subject: Schedule Virtual Visit    [facility] Dr. Olivier,  I was trying to schedule a visit with you, but there is nothing available for a few months and I wanted to check with you on some aspects of my treatment after the recent surgery as well as getting some labs completed.  Is there any way you could see me online In the next month in view that I am still not so mobile? I could only imagine how busy you are and would really appreciate it and I am available at any time that is convenient for you.   Thank you!  Regards,  Baudilio

## 2024-01-27 DIAGNOSIS — Z78.9 FEMALE-TO-MALE TRANSGENDER PERSON: ICD-10-CM

## 2024-01-29 RX ORDER — SYRINGE WITH NEEDLE, 1 ML 25GX5/8"
SYRINGE, EMPTY DISPOSABLE MISCELLANEOUS
Qty: 50 EACH | Refills: 0 | Status: SHIPPED | OUTPATIENT
Start: 2024-01-29

## 2024-01-29 RX ORDER — SPIRONOLACTONE 100 MG/1
100 TABLET, FILM COATED ORAL DAILY
Qty: 90 TABLET | Refills: 3 | Status: SHIPPED | OUTPATIENT
Start: 2024-01-29

## 2024-01-29 RX ORDER — LOSARTAN POTASSIUM 25 MG/1
25 TABLET ORAL DAILY
Qty: 90 TABLET | Refills: 3 | Status: SHIPPED | OUTPATIENT
Start: 2024-01-29

## 2024-01-29 NOTE — TELEPHONE ENCOUNTER
LOV: 1/5/24   RTC: none noted   Labs: 11/16/23   Future Appointments   Date Time Provider Department Center   2/14/2024 12:00 PM Arabella Olivier MD [facility]

## 2024-01-31 ENCOUNTER — LAB ENCOUNTER (OUTPATIENT)
Dept: LAB | Age: 59
End: 2024-01-31
Attending: INTERNAL MEDICINE
Payer: MEDICAID

## 2024-01-31 DIAGNOSIS — R73.01 ELEVATED FASTING GLUCOSE: ICD-10-CM

## 2024-01-31 LAB
EST. AVERAGE GLUCOSE BLD GHB EST-MCNC: 126 MG/DL (ref 68–126)
HBA1C MFR BLD: 6 % (ref ?–5.7)

## 2024-01-31 PROCEDURE — 83036 HEMOGLOBIN GLYCOSYLATED A1C: CPT

## 2024-01-31 PROCEDURE — 36415 COLL VENOUS BLD VENIPUNCTURE: CPT

## 2024-02-11 DIAGNOSIS — Z78.9 FEMALE-TO-MALE TRANSGENDER PERSON: ICD-10-CM

## 2024-02-11 RX ORDER — SYRINGE WITH NEEDLE, 1 ML 25GX5/8"
SYRINGE, EMPTY DISPOSABLE MISCELLANEOUS
Qty: 50 EACH | Refills: 0 | Status: CANCELLED | OUTPATIENT
Start: 2024-02-11

## 2024-02-14 ENCOUNTER — TELEMEDICINE (OUTPATIENT)
Dept: ENDOCRINOLOGY CLINIC | Facility: CLINIC | Age: 59
End: 2024-02-14
Payer: MEDICAID

## 2024-02-14 DIAGNOSIS — Z78.9 FEMALE-TO-MALE TRANSGENDER PERSON: Primary | ICD-10-CM

## 2024-02-14 PROCEDURE — 99214 OFFICE O/P EST MOD 30 MIN: CPT | Performed by: INTERNAL MEDICINE

## 2024-02-14 NOTE — PROGRESS NOTES
Name: Baudilio Mosqueda  Date: 2/14/2024    Referring Physician: No ref. provider found    This visit is conducted using Telemedicine with live, interactive video and audio.       HISTORY OF PRESENT ILLNESS   Baudilio Mosqueda is a 58 year old male who presents for No chief complaint on file.    #1 Adrenal Disease  59 y/o M presents for follow up evaluation of possible adrenal disease.    Surgery 1978 complicated by cardiac arrest; Cholecystectomy 10 years ago also complicated by cardiac arrest.  At that time he underwent evaluation and cardiac exam was normal but diagnosed with adrenal insufficiency.  He was started on hydrocortisone was able to undergo cholecystectomy without complication.  After surgery he was asymptomatic and lost insurance therefore did not undergo any further work up.      He underwent cosyntropin stimulation which was normal therefore no need for any steroid therapy.  Normal electrolytes.     #2 Testosterone  Of note he is also transgender patient and started on testosterone therapy in 1995 initially per physician.   Since last visit he is maintained on Testosterone 300mg IM Every 2 weeks.  He is feeling well on higher dose.   No side effects.     He has undergone mastectomy. He also underwent vaginoplasty in November 2023.     His last injection was 2 weeks ago.        REVIEW OF SYSTEMS  Eyes: no change in vision  Neurologic: no headache, generalized or focal weakness or numbness.  Head: normal  ENT: normal  Lungs: no shortness of breath, wheezing or GARCIA  Cardiovascular:  no chest pain or palpitations  Gastrointestinal:  no abdominal pain, bowel movement problems  Musculoskeletal: no muscle pain or arthralgia  /Gyne: no frequency or discomfort while urinating  Psychiatric:  no acute distress, anxiety  or depression  Skin: normal moisturized skin    Medications:     Current Outpatient Medications:     Syringe 25G X 1\" 3 ML Does not apply Misc, INject every 2 weeks, Disp: 10 each, Rfl:  1    Syringe/Needle, Disp, 22G X 1\" 3 ML Does not apply Misc, Use to inject testosterone every 14 days., Disp: 100 each, Rfl: 0    Syringe/Needle, Disp, (BD LUER-AMBROCIO SYRINGE) 22G X 1\" 3 ML Does not apply Misc, USE TO INJECT TESTOSTERONE EVERY 14 DAYS, Disp: 50 each, Rfl: 0    losartan 25 MG Oral Tab, Take 1 tablet (25 mg total) by mouth daily., Disp: 90 tablet, Rfl: 3    spironolactone 100 MG Oral Tab, Take 1 tablet (100 mg total) by mouth daily., Disp: 90 tablet, Rfl: 3    acetaminophen 500 MG Oral Tab, Take 1 tablet (500 mg total) by mouth every 6 (six) hours as needed for Pain., Disp: , Rfl:     docusate sodium 100 MG Oral Cap, Take 1 capsule (100 mg total) by mouth daily., Disp: , Rfl:     HYDROcodone-acetaminophen 5-325 MG Oral Tab, Take 1 tablet by mouth every 6 (six) hours as needed for Pain., Disp: , Rfl:     docusate sodium 100 MG Oral Cap, Take 1 capsule (100 mg total) by mouth 2 (two) times daily., Disp: 90 capsule, Rfl: 1    testosterone cypionate 200 mg/mL Intramuscular Solution, Inject 1.5 mL (300 mg total) into the muscle every 14 (fourteen) days., Disp: 10 mL, Rfl: 1    Omeprazole 40 MG Oral Capsule Delayed Release, Take 1 capsule (40 mg total) by mouth daily., Disp: 90 capsule, Rfl: 3    Budesonide-Formoterol Fumarate (SYMBICORT) 160-4.5 MCG/ACT Inhalation Aerosol, Inhale 2 puffs into the lungs 2 (two) times daily., Disp: 3 each, Rfl: 3    benzonatate 200 MG Oral Cap, Take 1 capsule (200 mg total) by mouth 3 (three) times daily as needed for cough., Disp: 30 capsule, Rfl: 3    oxybutynin ER 5 MG Oral Tablet 24 Hr, Take 1 tablet (5 mg total) by mouth daily., Disp: , Rfl:     ibuprofen 600 MG Oral Tab, Take 1 tablet (600 mg total) by mouth every 8 (eight) hours as needed for Pain., Disp: 90 tablet, Rfl: 3    rosuvastatin 20 MG Oral Tab, Take 1 tablet (20 mg total) by mouth every morning., Disp: 90 tablet, Rfl: 3     Allergies:   No Known Allergies    Social History:   Social History     Socioeconomic  History    Marital status:    Tobacco Use    Smoking status: Former     Types: Cigarettes     Start date: 1/24/2019    Smokeless tobacco: Never    Tobacco comments:     hx of 1/4 PPD   Vaping Use    Vaping Use: Never used   Substance and Sexual Activity    Alcohol use: Not Currently     Comment: holidays only    Drug use: No    Sexual activity: Not Currently     Partners: Female   Other Topics Concern    Caffeine Concern Yes     Comment: 1 cup of coffee daily    Exercise Yes     Comment: 4x/week    Seat Belt Yes       Medical History:   Past Medical History:   Diagnosis Date    Adrenal insufficiency ([place]'s disease) (HCC) 4/24/2019    Appendicitis 1979    Asthma     Bladder tumor 01/10/2022    Cancer of lateral wall of urinary bladder (HCC) 11/28/2021    Endometrial mass 01/2022    Endometriosis     Female-to-male transgender person     Born female. Transgender to male with testosterone therapy.     High blood pressure     High cholesterol     Hx of motion sickness     Personal history of antineoplastic chemotherapy 01/2022    one time dose with surgery    Pre-diabetes 8/14/2020    Visual impairment     glasses       Surgical history:   Past Surgical History:   Procedure Laterality Date    CHOLECYSTECTOMY  2008    [place], [place]    CYSTOURETHROSCOPY  11/24/2021    Cystoscopy Dr. Griffiths    CYSTOURETHROSCOPY,FULGUR 2-5CM LESN  01/10/2022    TURBT with 2g Gemcitabine instill post op    HYSTERECTOMY      OOPHORECTOMY Bilateral 01/20/2022    BSO at time of hyst    TOTAL ABDOMINAL HYSTERECTOMY  01/20/2022    GEN/BSO, lysis of adhesions for endometrial mass. Dr. Shyla Craft, [facility]       PHYSICAL EXAM  General Appearance:  alert, well developed, in no acute distress  Eyes:  normal conjunctivae, sclera., normal sclera and normal pupils  Throat/Neck: normal sound to voice.   Back: no kyphosis  Respiratory:  non-labored. no increased work of breathing.    Lymph Nodes:  No abnormal nodes noted  Skin:  normal  moisture and skin texture  Hematologic:  no excessive bruising  Psychiatric:  oriented to time, self, and place    ASSESSMENT/PLAN:    1. Adrenal Disease  - Unclear history of possible adrenal insufficiency  - He did require steroid therapy for surgery but never started on long term outpatient steroids  - Normal stimulation test 5/2019  - He does need steroid therapy for surgery  - However no maintenance hydrocortisone   - Recheck Cortisol     2. Transgender Patient   - he has been on long term testosterone therapy  - Continue Testosterone 300mg IM every 2 weeks  - Normal testosterone level   - Recheck CMP, Testosterone, CBC, TSH     RTC 6 months     2/14/2024  Arabella Olivier MD

## 2024-02-20 ENCOUNTER — LAB ENCOUNTER (OUTPATIENT)
Dept: LAB | Age: 59
End: 2024-02-20
Attending: INTERNAL MEDICINE
Payer: MEDICAID

## 2024-02-20 DIAGNOSIS — Z78.9 FEMALE-TO-MALE TRANSGENDER PERSON: ICD-10-CM

## 2024-02-20 LAB
ALBUMIN SERPL-MCNC: 3.9 G/DL (ref 3.4–5)
ALBUMIN/GLOB SERPL: 1.1 {RATIO} (ref 1–2)
ALP LIVER SERPL-CCNC: 71 U/L
ALT SERPL-CCNC: 24 U/L
ANION GAP SERPL CALC-SCNC: 4 MMOL/L (ref 0–18)
AST SERPL-CCNC: 16 U/L (ref 15–37)
BASOPHILS # BLD AUTO: 0.07 X10(3) UL (ref 0–0.2)
BASOPHILS NFR BLD AUTO: 0.7 %
BILIRUB SERPL-MCNC: 0.3 MG/DL (ref 0.1–2)
BUN BLD-MCNC: 13 MG/DL (ref 9–23)
CALCIUM BLD-MCNC: 9.5 MG/DL (ref 8.5–10.1)
CHLORIDE SERPL-SCNC: 110 MMOL/L (ref 98–112)
CO2 SERPL-SCNC: 23 MMOL/L (ref 21–32)
CORTIS SERPL-MCNC: 25.9 UG/DL
CREAT BLD-MCNC: 1 MG/DL
EGFRCR SERPLBLD CKD-EPI 2021: 86 ML/MIN/1.73M2 (ref 60–?)
EOSINOPHIL # BLD AUTO: 0.81 X10(3) UL (ref 0–0.7)
EOSINOPHIL NFR BLD AUTO: 8.1 %
ERYTHROCYTE [DISTWIDTH] IN BLOOD BY AUTOMATED COUNT: 14.6 %
FASTING STATUS PATIENT QL REPORTED: YES
GLOBULIN PLAS-MCNC: 3.4 G/DL (ref 2.8–4.4)
GLUCOSE BLD-MCNC: 121 MG/DL (ref 70–99)
HCT VFR BLD AUTO: 43.8 %
HGB BLD-MCNC: 13.6 G/DL
IMM GRANULOCYTES # BLD AUTO: 0.05 X10(3) UL (ref 0–1)
IMM GRANULOCYTES NFR BLD: 0.5 %
LYMPHOCYTES # BLD AUTO: 3.08 X10(3) UL (ref 1–4)
LYMPHOCYTES NFR BLD AUTO: 30.9 %
MCH RBC QN AUTO: 24.9 PG (ref 26–34)
MCHC RBC AUTO-ENTMCNC: 31.1 G/DL (ref 31–37)
MCV RBC AUTO: 80.2 FL
MONOCYTES # BLD AUTO: 0.92 X10(3) UL (ref 0.1–1)
MONOCYTES NFR BLD AUTO: 9.2 %
NEUTROPHILS # BLD AUTO: 5.04 X10 (3) UL (ref 1.5–7.7)
NEUTROPHILS # BLD AUTO: 5.04 X10(3) UL (ref 1.5–7.7)
NEUTROPHILS NFR BLD AUTO: 50.6 %
OSMOLALITY SERPL CALC.SUM OF ELEC: 285 MOSM/KG (ref 275–295)
PLATELET # BLD AUTO: 239 10(3)UL (ref 150–450)
POTASSIUM SERPL-SCNC: 4.1 MMOL/L (ref 3.5–5.1)
PROT SERPL-MCNC: 7.3 G/DL (ref 6.4–8.2)
RBC # BLD AUTO: 5.46 X10(6)UL
SODIUM SERPL-SCNC: 137 MMOL/L (ref 136–145)
TSI SER-ACNC: 2.69 MIU/ML (ref 0.36–3.74)
WBC # BLD AUTO: 10 X10(3) UL (ref 4–11)

## 2024-02-20 PROCEDURE — 36415 COLL VENOUS BLD VENIPUNCTURE: CPT

## 2024-02-20 PROCEDURE — 82533 TOTAL CORTISOL: CPT

## 2024-02-20 PROCEDURE — 80053 COMPREHEN METABOLIC PANEL: CPT

## 2024-02-20 PROCEDURE — 84410 TESTOSTERONE BIOAVAILABLE: CPT

## 2024-02-20 PROCEDURE — 84443 ASSAY THYROID STIM HORMONE: CPT

## 2024-02-20 PROCEDURE — 85025 COMPLETE CBC W/AUTO DIFF WBC: CPT

## 2024-02-25 LAB
SEX HORM BIND GLOB: 25.4 NMOL/L
TESTOST % FREE+WEAK BND: 48.4 %
TESTOST FREE+WEAK BND: 254.9 NG/DL
TESTOSTERONE TOT /MS: 526.7 NG/DL

## 2024-05-01 DIAGNOSIS — Z78.9 FEMALE-TO-MALE TRANSGENDER PERSON: ICD-10-CM

## 2024-05-02 RX ORDER — TESTOSTERONE CYPIONATE 200 MG/ML
300 INJECTION, SOLUTION INTRAMUSCULAR
Qty: 10 ML | Refills: 1 | Status: SHIPPED | OUTPATIENT
Start: 2024-05-02

## 2024-05-02 NOTE — TELEPHONE ENCOUNTER
Endocrine Refill protocol for oral medications    Protocol Criteria:    -Appointment with Endocrinology completed in the last 6 months or scheduled in the next 3 months     Verify the above has been completed or scheduled in the appropriate timeline. If so can send a 90 day supply with 1 refill.     Last completed office visit:02/14/24  Next scheduled Follow up: no f/u

## 2024-09-24 RX ORDER — ROSUVASTATIN CALCIUM 20 MG/1
20 TABLET, COATED ORAL EVERY MORNING
Qty: 90 TABLET | Refills: 0 | Status: SHIPPED | OUTPATIENT
Start: 2024-09-24

## 2024-09-24 NOTE — TELEPHONE ENCOUNTER
LOV: 1/5/24  RTC: none noted  Filled: 9/20/23 #90 3 REFILL   Labs: 10/19/23   Future Appointments   Date Time Provider Department Center   12/12/2024  2:00 PM Arabella Olivier MD [facility] [place]

## 2024-11-09 DIAGNOSIS — Z78.9 FEMALE-TO-MALE TRANSGENDER PERSON: ICD-10-CM

## 2024-11-11 RX ORDER — TESTOSTERONE CYPIONATE 200 MG/ML
300 INJECTION, SOLUTION INTRAMUSCULAR
Qty: 10 ML | Refills: 1 | Status: SHIPPED | OUTPATIENT
Start: 2024-11-11

## 2024-11-11 NOTE — TELEPHONE ENCOUNTER
Endocrine Refill protocol for oral medications    Protocol Criteria:  PASSED  Reason: N/A    If below requirement is met, send a 90-day supply with 1 refill per provider protocol.    Verify appointment with Endocrinology completed in the last 6 months or scheduled in the next 3 months.    Last completed office visit: 2/14/2024 Arabella Olivier MD   Next scheduled Follow up:   Future Appointments   Date Time Provider Department Center   2/13/2025  5:30 PM [facility]

## 2024-12-20 RX ORDER — IBUPROFEN 600 MG/1
600 TABLET, FILM COATED ORAL EVERY 8 HOURS PRN
Qty: 90 TABLET | Refills: 3 | Status: SHIPPED | OUTPATIENT
Start: 2024-12-20

## 2024-12-20 RX ORDER — ROSUVASTATIN CALCIUM 20 MG/1
20 TABLET, COATED ORAL EVERY MORNING
Qty: 90 TABLET | Refills: 3 | Status: SHIPPED | OUTPATIENT
Start: 2024-12-20

## 2024-12-20 NOTE — TELEPHONE ENCOUNTER
ibuprofen 600 MG Oral Tab         Sig: Take 1 tablet (600 mg total) by mouth every 8 (eight) hours as needed for Pain.    Disp: 90 tablet    Refills: 3    Start: 12/19/2024    Class: Normal    Non-formulary    Last ordered: 1 year ago (9/20/2023) by Ellie Pandey MD    Non-Narcotic Pain Medication Protocol Failed 12/19/2024 05:56 PM   Protocol Details In person appointment or virtual visit in the past 6 mos or appointment in next 3 mos       rosuvastatin 20 MG Oral Tab         Sig: Take 1 tablet (20 mg total) by mouth every morning.    Disp: 90 tablet    Refills: 0    Start: 12/19/2024    Class: Normal    Non-formulary    Last ordered: 2 months ago (9/24/2024) by Ellie Pandey MD    Cholesterol Medication Protocol [id number]/19/2024 05:56 PM   Protocol Details Lipid panel within past 12 months    ALT < 80    ALT resulted within past year    In person appointment or virtual visit in the past 12 mos or appointment in next 3 mos      To be filled at: [id number] [address] [phone], [phone]     LOV: 01/05/2024   RTC:03/2024   Labs: 10/19/2023   Last filled:n/a   Future Appointments   Date Time Provider Department Center   2/13/2025  5:30 PM Arabella Olivier MD [facility]

## 2025-01-16 ENCOUNTER — TELEPHONE (OUTPATIENT)
Dept: INTERNAL MEDICINE CLINIC | Facility: CLINIC | Age: 60
End: 2025-01-16

## 2025-01-16 DIAGNOSIS — C67.2 CANCER OF LATERAL WALL OF URINARY BLADDER (HCC): Primary | ICD-10-CM

## 2025-01-16 NOTE — TELEPHONE ENCOUNTER
Referral Request     1. Insurance Verification  -Does the patient have an HMO insurance plan that requires a referral? YES   *Ensure insurance is updated and verified in [facility].  *PPO plans usually do not require an authorized referral. Advise the patient to call their insurance for a list of in-network providers.    2. Referral Process  -If a referral is needed:  -Is there already a valid authorized or pending referral in [facility]? NO  If yes, notify the patient of the status and/or fax per patient request.  -Provider, specialty, and contact information (office phone and fax number):   Rob Griffiths MD  [address] [place] [id number]   phone     -Has the patient contacted their insurance plan to verify the specialist is in-network? YES   -Has the patient seen this specialist in the past? YES   -Does the patient have a scheduled appointment with the referral provider? YES If so, provide the date. If within 1 week, send TE as high priority.  -Diagnosis or reason for visit: UROLOGY  -Does the referral need to be sent via fax? NO Fax number: NO    3. Patient Notification  -Notify the patient that referrals can take up to 1 week or sometimes longer for authorization.  -Patient call back number:       [phone]

## 2025-01-16 NOTE — TELEPHONE ENCOUNTER
Dr. Pandey- Pended urology referral. LOV- 01/05/24. Have patient schedule an appointment? Please sign if agree. TY

## 2025-01-29 ENCOUNTER — PATIENT MESSAGE (OUTPATIENT)
Dept: INTERNAL MEDICINE CLINIC | Facility: CLINIC | Age: 60
End: 2025-01-29

## 2025-01-29 DIAGNOSIS — C67.2 CANCER OF LATERAL WALL OF URINARY BLADDER (HCC): Primary | ICD-10-CM

## 2025-01-29 DIAGNOSIS — Z78.9 FEMALE-TO-MALE TRANSGENDER PERSON: Primary | ICD-10-CM

## 2025-01-30 NOTE — TELEPHONE ENCOUNTER
Referral pended as it has been >1 year since last OV with PCP    Previous referral placed by PCP - 1/31/2019 by Dr Pandey     LOV with Dr Pandey 1/5/2024, FOV scheduled 3/21/2025

## 2025-02-07 ENCOUNTER — HOSPITAL ENCOUNTER (OUTPATIENT)
Dept: CT IMAGING | Facility: HOSPITAL | Age: 60
Discharge: HOME | End: 2025-02-07
Attending: UROLOGY
Payer: COMMERCIAL

## 2025-02-07 DIAGNOSIS — C67.2 CANCER OF LATERAL WALL OF URINARY BLADDER (HCC): ICD-10-CM

## 2025-02-07 LAB
CREAT BLD-MCNC: 1 MG/DL
EGFRCR SERPLBLD CKD-EPI 2021: 86 ML/MIN/1.73M2 (ref 60–?)

## 2025-02-07 PROCEDURE — 74178 CT ABD&PLV WO CNTR FLWD CNTR: CPT | Performed by: UROLOGY

## 2025-02-07 PROCEDURE — 82565 ASSAY OF CREATININE: CPT

## 2025-02-13 ENCOUNTER — OFFICE VISIT (OUTPATIENT)
Dept: ENDOCRINOLOGY CLINIC | Facility: CLINIC | Age: 60
End: 2025-02-13

## 2025-02-13 VITALS
SYSTOLIC BLOOD PRESSURE: 122 MMHG | DIASTOLIC BLOOD PRESSURE: 73 MMHG | WEIGHT: 179 LBS | BODY MASS INDEX: 31.71 KG/M2 | HEART RATE: 83 BPM | HEIGHT: 63 IN

## 2025-02-13 DIAGNOSIS — Z78.9 FEMALE-TO-MALE TRANSGENDER PERSON: Primary | ICD-10-CM

## 2025-02-13 PROCEDURE — 3008F BODY MASS INDEX DOCD: CPT | Performed by: INTERNAL MEDICINE

## 2025-02-13 PROCEDURE — 3074F SYST BP LT 130 MM HG: CPT | Performed by: INTERNAL MEDICINE

## 2025-02-13 PROCEDURE — 99214 OFFICE O/P EST MOD 30 MIN: CPT | Performed by: INTERNAL MEDICINE

## 2025-02-13 PROCEDURE — 3078F DIAST BP <80 MM HG: CPT | Performed by: INTERNAL MEDICINE

## 2025-02-13 RX ORDER — TESTOSTERONE CYPIONATE 200 MG/ML
300 INJECTION, SOLUTION INTRAMUSCULAR
Qty: 10 ML | Refills: 1 | Status: SHIPPED | OUTPATIENT
Start: 2025-02-13

## 2025-02-13 NOTE — PROGRESS NOTES
Name: Baudilio Mosqueda  Date: 2/13/2025    Referring Physician: Ellie Pandey    HISTORY OF PRESENT ILLNESS   Baudilio Mosqueda is a 59 year old male who presents for   Chief Complaint   Patient presents with    Follow - Up     #1 Adrenal Disease  60 y/o M presents for follow up evaluation of possible adrenal disease.    Surgery 1978 complicated by cardiac arrest; Cholecystectomy 10 years ago also complicated by cardiac arrest.  At that time he underwent evaluation and cardiac exam was normal but diagnosed with adrenal insufficiency.  He was started on hydrocortisone was able to undergo cholecystectomy without complication.  After surgery he was asymptomatic and lost insurance therefore did not undergo any further work up.      He underwent cosyntropin stimulation which was normal therefore no need for any steroid therapy.  Normal electrolytes.     #2 Testosterone  Of note he is also transgender patient and started on testosterone therapy in 1995 initially per physician.   Since last visit he is maintained on Testosterone 300mg IM Every 2 weeks.  He is feeling well on higher dose.   No side effects.     He has undergone mastectomy. He also underwent vaginoplasty in November 2023.     His last injection was 2 weeks ago.        REVIEW OF SYSTEMS  Eyes: no change in vision  Neurologic: no headache, generalized or focal weakness or numbness.  Head: normal  ENT: normal  Lungs: no shortness of breath, wheezing or GARCIA  Cardiovascular:  no chest pain or palpitations  Gastrointestinal:  no abdominal pain, bowel movement problems  Musculoskeletal: no muscle pain or arthralgia  /Gyne: no frequency or discomfort while urinating  Psychiatric:  no acute distress, anxiety  or depression  Skin: normal moisturized skin    Medications:     Current Outpatient Medications:     ibuprofen 600 MG Oral Tab, Take 1 tablet (600 mg total) by mouth every 8 (eight) hours as needed for Pain., Disp: 90 tablet, Rfl: 3    rosuvastatin 20 MG  Oral Tab, Take 1 tablet (20 mg total) by mouth every morning., Disp: 90 tablet, Rfl: 3    testosterone cypionate 200 mg/mL Intramuscular Solution, Inject 1.5 mL (300 mg total) into the muscle every 14 (fourteen) days., Disp: 10 mL, Rfl: 1    Syringe 25G X 1\" 3 ML Does not apply Misc, INject every 2 weeks, Disp: 10 each, Rfl: 1    Syringe/Needle, Disp, 22G X 1\" 3 ML Does not apply Misc, Use to inject testosterone every 14 days., Disp: 100 each, Rfl: 0    Syringe/Needle, Disp, (BD LUER-AMBROCIO SYRINGE) 22G X 1\" 3 ML Does not apply Misc, USE TO INJECT TESTOSTERONE EVERY 14 DAYS, Disp: 50 each, Rfl: 0    losartan 25 MG Oral Tab, Take 1 tablet (25 mg total) by mouth daily., Disp: 90 tablet, Rfl: 3    spironolactone 100 MG Oral Tab, Take 1 tablet (100 mg total) by mouth daily., Disp: 90 tablet, Rfl: 3    acetaminophen 500 MG Oral Tab, Take 1 tablet (500 mg total) by mouth every 6 (six) hours as needed for Pain., Disp: , Rfl:     docusate sodium 100 MG Oral Cap, Take 1 capsule (100 mg total) by mouth daily., Disp: , Rfl:     HYDROcodone-acetaminophen 5-325 MG Oral Tab, Take 1 tablet by mouth every 6 (six) hours as needed for Pain., Disp: , Rfl:     docusate sodium 100 MG Oral Cap, Take 1 capsule (100 mg total) by mouth 2 (two) times daily., Disp: 90 capsule, Rfl: 1    Omeprazole 40 MG Oral Capsule Delayed Release, Take 1 capsule (40 mg total) by mouth daily., Disp: 90 capsule, Rfl: 3    Budesonide-Formoterol Fumarate (SYMBICORT) 160-4.5 MCG/ACT Inhalation Aerosol, Inhale 2 puffs into the lungs 2 (two) times daily., Disp: 3 each, Rfl: 3    benzonatate 200 MG Oral Cap, Take 1 capsule (200 mg total) by mouth 3 (three) times daily as needed for cough., Disp: 30 capsule, Rfl: 3    oxybutynin ER 5 MG Oral Tablet 24 Hr, Take 1 tablet (5 mg total) by mouth daily., Disp: , Rfl:      Allergies:   No Known Allergies    Social History:   Social History     Socioeconomic History    Marital status:    Tobacco Use    Smoking status:  Former     Types: Cigarettes     Start date: 1/24/2019    Smokeless tobacco: Never    Tobacco comments:     hx of 1/4 PPD   Vaping Use    Vaping status: Never Used   Substance and Sexual Activity    Alcohol use: Not Currently     Comment: holidays only    Drug use: No    Sexual activity: Not Currently     Partners: Female   Other Topics Concern    Caffeine Concern Yes     Comment: 1 cup of coffee daily    Exercise Yes     Comment: 4x/week    Seat Belt Yes       Medical History:   Past Medical History:    Adrenal insufficiency ([place]'s disease) (HCC)    Appendicitis    Asthma (HCC)    Bladder tumor    Cancer of lateral wall of urinary bladder (HCC)    Endometrial mass    Endometriosis    Female-to-male transgender person    Born female. Transgender to male with testosterone therapy.     High blood pressure    High cholesterol    Hx of motion sickness    Personal history of antineoplastic chemotherapy    one time dose with surgery    Pre-diabetes    Visual impairment    glasses       Surgical history:   Past Surgical History:   Procedure Laterality Date    Cholecystectomy  2008    [place], [place]    Cystourethroscopy  11/24/2021    Cystoscopy Dr. Griffiths    Cystourethroscopy,fulgur 2-5cm lesn  01/10/2022    TURBT with 2g Gemcitabine instill post op    Hysterectomy      Oophorectomy Bilateral 01/20/2022    BSO at time of hyst    Total abdominal hysterectomy  01/20/2022    GEN/BSO, lysis of adhesions for endometrial mass. Dr. Shyla Craft, [facility]       PHYSICAL EXAM  /73   Pulse 83   Ht 5' 3\" (1.6 m)   Wt 179 lb (81.2 kg)   LMP  (LMP Unknown)   BMI 31.71 kg/m²     General Appearance:  alert, well developed, in no acute distress  Eyes:  normal conjunctivae, sclera., normal sclera and normal pupils  Throat/Neck: normal sound to voice.   Back: no kyphosis  Respiratory:  non-labored. no increased work of breathing.    Lymph Nodes:  No abnormal nodes noted  Skin:  normal moisture and skin texture  Hematologic:   no excessive bruising  Psychiatric:  oriented to time, self, and place    ASSESSMENT/PLAN:    1. Adrenal Disease  - Unclear history of possible adrenal insufficiency  - He did require steroid therapy for surgery but never started on long term outpatient steroids  - Normal stimulation test 5/2019  - He does need steroid therapy for surgery  - However no maintenance hydrocortisone   - Recheck Cortisol     2. Transgender Patient   - he has been on long term testosterone therapy  - Continue Testosterone 300mg IM every 2 weeks  - Normal testosterone level   - Recheck CMP, Testosterone, CBC, TSH     RTC 1 year     2/13/2025  Arabella Olivier MD

## 2025-03-21 ENCOUNTER — OFFICE VISIT (OUTPATIENT)
Dept: INTERNAL MEDICINE CLINIC | Facility: CLINIC | Age: 60
End: 2025-03-21
Payer: COMMERCIAL

## 2025-03-21 VITALS
WEIGHT: 173.38 LBS | HEIGHT: 63 IN | TEMPERATURE: 97 F | OXYGEN SATURATION: 98 % | SYSTOLIC BLOOD PRESSURE: 122 MMHG | RESPIRATION RATE: 16 BRPM | HEART RATE: 78 BPM | BODY MASS INDEX: 30.72 KG/M2 | DIASTOLIC BLOOD PRESSURE: 80 MMHG

## 2025-03-21 DIAGNOSIS — Z12.11 SCREENING FOR COLON CANCER: ICD-10-CM

## 2025-03-21 DIAGNOSIS — I10 ESSENTIAL HYPERTENSION: ICD-10-CM

## 2025-03-21 DIAGNOSIS — E78.2 MIXED HYPERLIPIDEMIA: ICD-10-CM

## 2025-03-21 DIAGNOSIS — Z00.00 ENCOUNTER FOR ROUTINE ADULT MEDICAL EXAMINATION: Primary | ICD-10-CM

## 2025-03-21 DIAGNOSIS — K76.0 HEPATIC STEATOSIS: ICD-10-CM

## 2025-03-21 DIAGNOSIS — R73.01 ELEVATED FASTING GLUCOSE: ICD-10-CM

## 2025-03-21 DIAGNOSIS — C67.2 CANCER OF LATERAL WALL OF URINARY BLADDER (HCC): ICD-10-CM

## 2025-03-21 RX ORDER — LOSARTAN POTASSIUM 25 MG/1
25 TABLET ORAL DAILY
Qty: 90 TABLET | Refills: 3 | Status: SHIPPED | OUTPATIENT
Start: 2025-03-21

## 2025-03-21 RX ORDER — SPIRONOLACTONE 100 MG/1
100 TABLET, FILM COATED ORAL DAILY
Qty: 90 TABLET | Refills: 3 | Status: SHIPPED | OUTPATIENT
Start: 2025-03-21

## 2025-03-21 NOTE — PROGRESS NOTES
[facility] Internal Medicine Office Note  Chief Complaint:   Chief Complaint   Patient presents with    Physical       HPI:   This is a 59 year old adult coming in for annual physical  HPI    H/o bladder cancer and following with urology  Found to have hepatic steatosis on recent CT   Normal LFTs at last check a year ago. Order for CMP is in the system     Due for colon ca screening; prefers stool testing    Female to male transgender person. On testosterone injections   He had chest surgery - no further mammo needed     He thinks he had Tdap within the past 10 year    HTN - BP has been at goal     Patient Active Problem List   Diagnosis    Mixed hyperlipidemia    Essential hypertension    Adrenal insufficiency (Toombs's disease) (HCC)    Female-to-male transgender person    Atherosclerosis of aorta    Pre-diabetes    Cancer of lateral wall of urinary bladder (HCC)    Hx of endometriosis    Pelvic pain    Uterine mass    Endometrial mass    Status post hysterectomy with oophorectomy    Memory change     Past Surgical History:   Procedure Laterality Date    Cholecystectomy  2008    [place], [place]    Cystourethroscopy  11/24/2021    Cystoscopy Dr. Griffiths    Cystourethroscopy,fulgur 2-5cm lesn  01/10/2022    TURBT with 2g Gemcitabine instill post op    Hysterectomy      Oophorectomy Bilateral 01/20/2022    BSO at time of hyst    Total abdominal hysterectomy  01/20/2022    GEN/BSO, lysis of adhesions for endometrial mass. Dr. Shyla Craft, [facility]     Family History   Problem Relation Age of Onset    Stroke Father 61    Stroke Paternal Grandfather     Other (Other) Mother         CKD    Pancreatic Cancer Paternal Grandmother         I reviewed his's Past Medical History, Past Surgical History, Family History and   Social History updated shows  Social History     Socioeconomic History    Marital status:    Tobacco Use    Smoking status: Former     Types: Cigarettes     Start date: 1/24/2019    Smokeless  tobacco: Never    Tobacco comments:     hx of 1/4 PPD   Vaping Use    Vaping status: Never Used   Substance and Sexual Activity    Alcohol use: Not Currently     Comment: holidays only    Drug use: No    Sexual activity: Not Currently     Partners: Female   Other Topics Concern    Caffeine Concern Yes     Comment: 1 cup of coffee daily    Exercise Yes     Comment: 4x/week    Seat Belt Yes     Social Drivers of Health     Food Insecurity: No Food Insecurity (3/21/2025)    NCSS - Food Insecurity     Worried About Running Out of Food in the Last Year: No     Ran Out of Food in the Last Year: No   Transportation Needs: No Transportation Needs (3/21/2025)    NCSS - Transportation     Lack of Transportation: No   Housing Stability: Not At Risk (3/21/2025)    NCSS - Housing/Utilities     Has Housing: Yes     Worried About Losing Housing: No     Unable to Get Utilities: No     Allergies:  Allergies[1]  Current Outpatient Medications   Medication Sig Dispense Refill    losartan 25 MG Oral Tab Take 1 tablet (25 mg total) by mouth daily. 90 tablet 3    spironolactone 100 MG Oral Tab Take 1 tablet (100 mg total) by mouth daily. 90 tablet 3    testosterone cypionate 200 mg/mL Intramuscular Solution Inject 1.5 mL (300 mg total) into the muscle every 14 (fourteen) days. 10 mL 1    ibuprofen 600 MG Oral Tab Take 1 tablet (600 mg total) by mouth every 8 (eight) hours as needed for Pain. 90 tablet 3    rosuvastatin 20 MG Oral Tab Take 1 tablet (20 mg total) by mouth every morning. 90 tablet 3    Syringe 25G X 1\" 3 ML Does not apply Misc INject every 2 weeks 10 each 1    Syringe/Needle, Disp, 22G X 1\" 3 ML Does not apply Misc Use to inject testosterone every 14 days. 100 each 0    Syringe/Needle, Disp, (BD LUER-AMBROCIO SYRINGE) 22G X 1\" 3 ML Does not apply Misc USE TO INJECT TESTOSTERONE EVERY 14 DAYS 50 each 0    acetaminophen 500 MG Oral Tab Take 1 tablet (500 mg total) by mouth every 6 (six) hours as needed for Pain.       HYDROcodone-acetaminophen 5-325 MG Oral Tab Take 1 tablet by mouth every 6 (six) hours as needed for Pain.      Omeprazole 40 MG Oral Capsule Delayed Release Take 1 capsule (40 mg total) by mouth daily. 90 capsule 3    Budesonide-Formoterol Fumarate (SYMBICORT) 160-4.5 MCG/ACT Inhalation Aerosol Inhale 2 puffs into the lungs 2 (two) times daily. 3 each 3    benzonatate 200 MG Oral Cap Take 1 capsule (200 mg total) by mouth 3 (three) times daily as needed for cough. 30 capsule 3    oxybutynin ER 5 MG Oral Tablet 24 Hr Take 1 tablet (5 mg total) by mouth daily.      docusate sodium 100 MG Oral Cap Take 1 capsule (100 mg total) by mouth 2 (two) times daily. (Patient not taking: Reported on 3/21/2025) 90 capsule 1         REVIEW OF SYSTEMS:   Review of Systems   Constitutional:  Negative for fever.   HENT:  Negative for congestion.    Eyes:  Negative for visual disturbance.   Respiratory:  Negative for shortness of breath.    Cardiovascular:  Negative for chest pain.   Gastrointestinal:  Negative for constipation.   Genitourinary:  Negative for dysuria.   Neurological: Negative.    Hematological: Negative.    Psychiatric/Behavioral: Negative.          EXAM:   /80   Pulse 78   Temp 96.9 °F (36.1 °C) (Temporal)   Resp 16   Ht 5' 3\" (1.6 m)   Wt 173 lb 6.4 oz (78.7 kg)   LMP  (LMP Unknown)   SpO2 98%   BMI 30.72 kg/m²  Estimated body mass index is 30.72 kg/m² as calculated from the following:    Height as of this encounter: 5' 3\" (1.6 m).    Weight as of this encounter: 173 lb 6.4 oz (78.7 kg).   Vital signs reviewed. Appears stated age, well groomed.  Physical Exam  Vitals reviewed.   Constitutional:       General: He is not in acute distress.     Appearance: He is well-developed.   HENT:      Head: Normocephalic and atraumatic.      Right Ear: Tympanic membrane normal.      Left Ear: Tympanic membrane normal.   Eyes:      Conjunctiva/sclera: Conjunctivae normal.   Cardiovascular:      Rate and Rhythm:  Normal rate and regular rhythm.      Heart sounds: Normal heart sounds.   Pulmonary:      Effort: Pulmonary effort is normal.      Breath sounds: Normal breath sounds.   Abdominal:      Palpations: Abdomen is soft.   Musculoskeletal:      Cervical back: Neck supple.      Right lower leg: No edema.      Left lower leg: No edema.   Lymphadenopathy:      Cervical: No cervical adenopathy.   Skin:     General: Skin is warm and dry.   Neurological:      General: No focal deficit present.      Mental Status: He is alert.   Psychiatric:         Mood and Affect: Mood normal.          ASSESSMENT AND PLAN:   Baudilio Mosqueda is a 59 year old adult with  1. Encounter for routine adult medical examination    2. Essential hypertension    3. Mixed hyperlipidemia    4. Cancer of lateral wall of urinary bladder (HCC)    5. Hepatic steatosis    6. Screening for colon cancer    7. Elevated fasting glucose          The plan is as follows  Baudilio was seen today for physical.    Diagnoses and all orders for this visit:    Encounter for routine adult medical examination  -colon ca screening due  -no further pap or mammo due to surgical removal     Essential hypertension - at goal; cont losartan 25mg daily    Mixed hyperlipidemia - cont rosuvastatin 20mg; check labs   -     Lipid Panel; Future    Cancer of lateral wall of urinary bladder (HCC) -per urology     Hepatic steatosis - seen on CT urogram. Plan for US liver with elastrography to eval for fibrosis/cirrhosis.   -     US LIVER WITH ELASTOGRAPHY(CPT=76705,46461); Future  -     Hepatology Referral - External    Screening for colon cancer - declines colonoscopy and prefers stool testing  -     Occult Blood, Fecal, Immunoassay (Blue cards) [E]; Future    Elevated fasting glucose -due for A1c; pre-diabetes range previously  -     Hemoglobin A1C; Future    Other orders  -     losartan 25 MG Oral Tab; Take 1 tablet (25 mg total) by mouth daily.  -     spironolactone 100 MG Oral  Tab; Take 1 tablet (100 mg total) by mouth daily.        Orders Placed This Encounter   Procedures    Occult Blood, Fecal, Immunoassay (Blue cards) [E]    Lipid Panel    Hemoglobin A1C       Meds & Refills for this Visit:  Requested Prescriptions     Signed Prescriptions Disp Refills    losartan 25 MG Oral Tab 90 tablet 3     Sig: Take 1 tablet (25 mg total) by mouth daily.    spironolactone 100 MG Oral Tab 90 tablet 3     Sig: Take 1 tablet (100 mg total) by mouth daily.       Imaging & Consults:  HEPATOLOGY - EXTERNAL  US LIVER WITH ELASTOGRAPHY(CPT=76705,26396)    Health Maintenance Due   Topic Date Due    Pneumococcal Vaccine: 50+ Years (1 of 2 - PCV) Never done    DTaP,Tdap,and Td Vaccines (1 - Tdap) Never done    Zoster Vaccines (1 of 2) Never done    Colorectal Cancer Screening  03/23/2024    Influenza Vaccine (1) 10/01/2024    Annual Physical  01/05/2025     Patient/Caregiver Education: Patient/Caregiver Education: There are no barriers to learning. Medical education done. Outcome: Patient verbalizes understanding. Patient is notified to call with any questions, complications, allergies, or worsening or changing symptoms.  Patient is to call with any side effects or complications from the treatments as a result of today.     Ellie Pandey MD         [1] No Known Allergies

## 2025-03-26 ENCOUNTER — LAB ENCOUNTER (OUTPATIENT)
Dept: LAB | Age: 60
End: 2025-03-26
Attending: INTERNAL MEDICINE
Payer: COMMERCIAL

## 2025-03-26 DIAGNOSIS — R73.01 ELEVATED FASTING GLUCOSE: ICD-10-CM

## 2025-03-26 DIAGNOSIS — Z78.9 FEMALE-TO-MALE TRANSGENDER PERSON: ICD-10-CM

## 2025-03-26 DIAGNOSIS — E78.2 MIXED HYPERLIPIDEMIA: ICD-10-CM

## 2025-03-26 LAB
ALBUMIN SERPL-MCNC: 4.9 G/DL (ref 3.2–4.8)
ALBUMIN/GLOB SERPL: 1.9 {RATIO} (ref 1–2)
ALP LIVER SERPL-CCNC: 64 U/L
ALT SERPL-CCNC: 25 U/L
ANION GAP SERPL CALC-SCNC: 10 MMOL/L (ref 0–18)
AST SERPL-CCNC: 25 U/L (ref ?–34)
BASOPHILS # BLD AUTO: 0.07 X10(3) UL (ref 0–0.2)
BASOPHILS NFR BLD AUTO: 0.7 %
BILIRUB SERPL-MCNC: 0.6 MG/DL (ref 0.2–1.1)
BUN BLD-MCNC: 12 MG/DL (ref 9–23)
CALCIUM BLD-MCNC: 10.4 MG/DL (ref 8.7–10.6)
CHLORIDE SERPL-SCNC: 106 MMOL/L (ref 98–112)
CHOLEST SERPL-MCNC: 143 MG/DL (ref ?–200)
CO2 SERPL-SCNC: 23 MMOL/L (ref 21–32)
CORTIS SERPL-MCNC: 19.6 UG/DL
CREAT BLD-MCNC: 0.98 MG/DL
EGFRCR SERPLBLD CKD-EPI 2021: 88 ML/MIN/1.73M2 (ref 60–?)
EOSINOPHIL # BLD AUTO: 0.77 X10(3) UL (ref 0–0.7)
EOSINOPHIL NFR BLD AUTO: 7.9 %
ERYTHROCYTE [DISTWIDTH] IN BLOOD BY AUTOMATED COUNT: 12.5 %
EST. AVERAGE GLUCOSE BLD GHB EST-MCNC: 111 MG/DL (ref 68–126)
FASTING PATIENT LIPID ANSWER: YES
FASTING STATUS PATIENT QL REPORTED: YES
GLOBULIN PLAS-MCNC: 2.6 G/DL (ref 2–3.5)
GLUCOSE BLD-MCNC: 110 MG/DL (ref 70–99)
HBA1C MFR BLD: 5.5 % (ref ?–5.7)
HCT VFR BLD AUTO: 51.9 %
HDLC SERPL-MCNC: 33 MG/DL (ref 40–59)
HGB BLD-MCNC: 17.6 G/DL
IMM GRANULOCYTES # BLD AUTO: 0.04 X10(3) UL (ref 0–1)
IMM GRANULOCYTES NFR BLD: 0.4 %
LDLC SERPL CALC-MCNC: 69 MG/DL (ref ?–100)
LYMPHOCYTES # BLD AUTO: 2.44 X10(3) UL (ref 1–4)
LYMPHOCYTES NFR BLD AUTO: 24.9 %
MCH RBC QN AUTO: 30.6 PG (ref 26–34)
MCHC RBC AUTO-ENTMCNC: 33.9 G/DL (ref 31–37)
MCV RBC AUTO: 90.3 FL
MONOCYTES # BLD AUTO: 0.78 X10(3) UL (ref 0.1–1)
MONOCYTES NFR BLD AUTO: 8 %
NEUTROPHILS # BLD AUTO: 5.7 X10 (3) UL (ref 1.5–7.7)
NEUTROPHILS # BLD AUTO: 5.7 X10(3) UL (ref 1.5–7.7)
NEUTROPHILS NFR BLD AUTO: 58.1 %
NONHDLC SERPL-MCNC: 110 MG/DL (ref ?–130)
OSMOLALITY SERPL CALC.SUM OF ELEC: 288 MOSM/KG (ref 275–295)
PLATELET # BLD AUTO: 217 10(3)UL (ref 150–450)
POTASSIUM SERPL-SCNC: 4.2 MMOL/L (ref 3.5–5.1)
PROT SERPL-MCNC: 7.5 G/DL (ref 5.7–8.2)
RBC # BLD AUTO: 5.75 X10(6)UL
SODIUM SERPL-SCNC: 139 MMOL/L (ref 136–145)
TRIGL SERPL-MCNC: 255 MG/DL (ref 30–149)
TSI SER-ACNC: 1.85 UIU/ML (ref 0.55–4.78)
VLDLC SERPL CALC-MCNC: 39 MG/DL (ref 0–30)
WBC # BLD AUTO: 9.8 X10(3) UL (ref 4–11)

## 2025-03-26 PROCEDURE — 36415 COLL VENOUS BLD VENIPUNCTURE: CPT

## 2025-03-26 PROCEDURE — 80061 LIPID PANEL: CPT

## 2025-03-26 PROCEDURE — 83036 HEMOGLOBIN GLYCOSYLATED A1C: CPT

## 2025-03-26 PROCEDURE — 80053 COMPREHEN METABOLIC PANEL: CPT

## 2025-03-26 PROCEDURE — 84410 TESTOSTERONE BIOAVAILABLE: CPT

## 2025-03-26 PROCEDURE — 82533 TOTAL CORTISOL: CPT

## 2025-03-26 PROCEDURE — 85025 COMPLETE CBC W/AUTO DIFF WBC: CPT

## 2025-03-26 PROCEDURE — 84443 ASSAY THYROID STIM HORMONE: CPT

## 2025-03-31 LAB
SEX HORM BIND GLOB: 25.5 NMOL/L
TESTOST % FREE+WEAK BND: 42.6 %
TESTOST FREE+WEAK BND: 135.3 NG/DL
TESTOSTERONE TOT /MS: 317.5 NG/DL

## 2025-04-02 ENCOUNTER — PATIENT MESSAGE (OUTPATIENT)
Dept: INTERNAL MEDICINE CLINIC | Facility: CLINIC | Age: 60
End: 2025-04-02

## 2025-04-02 DIAGNOSIS — C67.2 CANCER OF LATERAL WALL OF URINARY BLADDER (HCC): Primary | ICD-10-CM

## 2025-04-04 ENCOUNTER — LAB ENCOUNTER (OUTPATIENT)
Dept: LAB | Age: 60
End: 2025-04-04
Attending: INTERNAL MEDICINE
Payer: COMMERCIAL

## 2025-04-04 DIAGNOSIS — Z12.11 SCREENING FOR COLON CANCER: ICD-10-CM

## 2025-04-04 LAB — HEMOCCULT STL QL: NEGATIVE

## 2025-04-04 PROCEDURE — 82274 ASSAY TEST FOR BLOOD FECAL: CPT

## 2025-04-11 NOTE — TELEPHONE ENCOUNTER
Called patient regarding appt change/message below.  LVMTCB
If there is no acute change in symptoms over past 2 weeks, then ok for in person appointment with no shortness of breath, fever, or loss of sense of smell.
Outbound call to patient to confirm scheduled appointment and give charted information from doctor
Patient calling back returning call; he states \"he has no symptoms at all, that was 4 months ago he had covid 19 like symptoms. \"  He says since then 'NO symptoms. \" Attempted to change to video visit patient said he will cancel appt but will not do video visit as he feels it is not needed. Discussed protocol with visits, and multiple changes we have made to face to face appointments he states please call him back to discuss further \"he is fine. \"
Patient has an appointment scheduled on 07/30/2020 for cpx and breathing issues. When inquired about breathing issues, pt commented : \"No other symptoms at this time. I had high temperature and severe cough for three weeks in the beginning of March. Not sure if it was Covid - 19 but self isolated at the time. Unfortunately, the cough remains even now but not so severe\". Okay to keep appointment?
Please change to video visit and will do a physical in the future
[Annual Wellness Visit] : an annual wellness visit
[Annual Wellness Visit] : an annual wellness visit

## 2025-05-02 ENCOUNTER — HOSPITAL ENCOUNTER (OUTPATIENT)
Dept: ULTRASOUND IMAGING | Age: 60
Discharge: HOME | End: 2025-05-02
Attending: INTERNAL MEDICINE
Payer: COMMERCIAL

## 2025-05-02 ENCOUNTER — HOSPITAL ENCOUNTER (OUTPATIENT)
Dept: CT IMAGING | Age: 60
Discharge: HOME | End: 2025-05-02
Attending: INTERNAL MEDICINE
Payer: COMMERCIAL

## 2025-05-02 DIAGNOSIS — K74.00 LIVER FIBROSIS: Primary | ICD-10-CM

## 2025-05-02 DIAGNOSIS — C67.2 CANCER OF LATERAL WALL OF URINARY BLADDER (HCC): Primary | ICD-10-CM

## 2025-05-02 DIAGNOSIS — K76.0 HEPATIC STEATOSIS: ICD-10-CM

## 2025-05-02 DIAGNOSIS — C67.2 CANCER OF LATERAL WALL OF URINARY BLADDER (HCC): ICD-10-CM

## 2025-05-02 PROCEDURE — 76981 USE PARENCHYMA: CPT | Performed by: INTERNAL MEDICINE

## 2025-05-02 PROCEDURE — 74178 CT ABD&PLV WO CNTR FLWD CNTR: CPT | Performed by: INTERNAL MEDICINE

## 2025-05-02 PROCEDURE — 76705 ECHO EXAM OF ABDOMEN: CPT | Performed by: INTERNAL MEDICINE

## 2025-05-02 PROCEDURE — 76377 3D RENDER W/INTRP POSTPROCES: CPT | Performed by: INTERNAL MEDICINE

## 2025-05-22 DIAGNOSIS — Z78.9 FEMALE-TO-MALE TRANSGENDER PERSON: ICD-10-CM

## 2025-05-22 RX ORDER — SYRINGE WITH NEEDLE, 1 ML 25GX5/8"
SYRINGE, EMPTY DISPOSABLE MISCELLANEOUS
Qty: 50 EACH | Refills: 1 | Status: SHIPPED | OUTPATIENT
Start: 2025-05-22

## 2025-05-22 NOTE — TELEPHONE ENCOUNTER
Endocrine Refill protocol for Glucose testing supplies     Protocol Criteria: PASSED Reason: N/A    If below requirement is met, send a 90-day supply with 1 refill per provider protocol.    Verify appointment with Endocrinology completed in the last 6 months or scheduled in the next 3 months.    Last completed office visit:2/13/2025 Arabella Olivier MD   Last completed telemed visit: Visit date not found  Next scheduled Follow up:   Future Appointments   Date Time Provider Department Center          2/20/2026 11:00 AM Arabella Olivier MD [facility]

## 2025-06-16 ENCOUNTER — OFFICE VISIT (OUTPATIENT)
Dept: SURGERY | Facility: CLINIC | Age: 60
End: 2025-06-16

## 2025-06-16 DIAGNOSIS — K75.81 METABOLIC DYSFUNCTION-ASSOCIATED STEATOHEPATITIS (MASH): Primary | ICD-10-CM

## 2025-06-16 DIAGNOSIS — K74.02 ADVANCED HEPATIC FIBROSIS: ICD-10-CM

## 2025-06-18 ENCOUNTER — TELEPHONE (OUTPATIENT)
Dept: INTERNAL MEDICINE CLINIC | Facility: CLINIC | Age: 60
End: 2025-06-18

## 2025-06-18 NOTE — TELEPHONE ENCOUNTER
Patient has liver biopsy on 7/9 and needs H&P  No available appts   Wonders if you will overbook on a Wednesday or Friday

## 2025-06-19 NOTE — TELEPHONE ENCOUNTER
Future Appointments   Date Time Provider Department Center   6/27/2025  1:30 PM Ellie Pandey MD EMG 8 [facility] [place]   7/9/2025  9:45 AM  [facility]  LAB [facility]   7/9/2025 10:45 AM  US RM 3  US [facility]   9/15/2025  1:15 PM Raimundo Howard MD [facility]   2/20/2026 11:00 AM [facility]

## 2025-06-27 ENCOUNTER — OFFICE VISIT (OUTPATIENT)
Dept: INTERNAL MEDICINE CLINIC | Facility: CLINIC | Age: 60
End: 2025-06-27
Payer: COMMERCIAL

## 2025-06-27 VITALS
HEART RATE: 70 BPM | SYSTOLIC BLOOD PRESSURE: 120 MMHG | BODY MASS INDEX: 30.12 KG/M2 | DIASTOLIC BLOOD PRESSURE: 68 MMHG | RESPIRATION RATE: 18 BRPM | OXYGEN SATURATION: 95 % | HEIGHT: 63 IN | TEMPERATURE: 98 F | WEIGHT: 170 LBS

## 2025-06-27 DIAGNOSIS — I10 ESSENTIAL HYPERTENSION: ICD-10-CM

## 2025-06-27 DIAGNOSIS — Z01.810 PREOPERATIVE CARDIOVASCULAR EXAMINATION: Primary | ICD-10-CM

## 2025-06-27 DIAGNOSIS — R05.8 POST-VIRAL COUGH SYNDROME: ICD-10-CM

## 2025-06-27 PROCEDURE — 3078F DIAST BP <80 MM HG: CPT | Performed by: INTERNAL MEDICINE

## 2025-06-27 PROCEDURE — 99214 OFFICE O/P EST MOD 30 MIN: CPT | Performed by: INTERNAL MEDICINE

## 2025-06-27 PROCEDURE — 3008F BODY MASS INDEX DOCD: CPT | Performed by: INTERNAL MEDICINE

## 2025-06-27 PROCEDURE — 3074F SYST BP LT 130 MM HG: CPT | Performed by: INTERNAL MEDICINE

## 2025-06-27 RX ORDER — FLUTICASONE PROPIONATE 50 MCG
2 SPRAY, SUSPENSION (ML) NASAL DAILY
Qty: 16 G | Refills: 0 | Status: SHIPPED | OUTPATIENT
Start: 2025-06-27 | End: 2025-07-27

## 2025-06-27 NOTE — PATIENT INSTRUCTIONS
Avoid NSAIDs and aspirin for 7 days prior to procedure  Avoid vitamins and supplements for 14 days prior to procedure    Tylenol/acetaminophen is ok    Do not take losartan the night before or morning of procedure    Start fluticasone nasal steroid 1 spray each nostril daily and increase to 2 sprays each nostril once daily after a few days.       Ask Dr. Olivier about if steroids are needed for the day of ultrasound guided liver biopsy procedure

## 2025-06-27 NOTE — PROGRESS NOTES
Preoperative History and Physical Internal Medicine    CC:   Chief Complaint   Patient presents with    Pre-Op Exam     Ultrasound biopsy in the liver scheduled for 7/9.        Baudilio Mosqueda is 59 year old presenting for a preoperative exam.    This patient is having surgery on date: 7/9/25 for procedure: US guided liver biopsy  I'm seeing the patient at the request from : Dr. Howard because of cardiovascaulr  Reporting mechanism back to the doctor via fax and this note.    HPI: denies chest pain or shortness of breath     He had covid infection about 6 weeks ago and cough has persisted. He notes postnasal drip is where he thinks the cough is coming from. The cough is getting better     H/o adrenal insufficiency and has taken steriods at the time of surgery previously      HTN - BP well controlled     Past Medical History[1]    Past Surgical History[2]    Social History:  Short Social Hx on File[3]    Family History[4]    Allergies:  Allergies[5]  Current Meds:  Medications - Current[6]    Review of Systems   Review of Systems   Constitutional:  Negative for fever.   HENT:  Negative for congestion.    Eyes:  Negative for visual disturbance.   Respiratory:  Negative for shortness of breath.    Cardiovascular:  Negative for chest pain.   Gastrointestinal:  Negative for constipation.   Genitourinary:  Negative for dysuria.   Neurological: Negative.    Hematological: Negative.    Psychiatric/Behavioral: Negative.         Physical Exam   /68   Pulse 70   Temp 97.6 °F (36.4 °C) (Temporal)   Resp 18   Ht 5' 3\" (1.6 m)   Wt 170 lb (77.1 kg)   LMP  (LMP Unknown)   SpO2 95%   BMI 30.11 kg/m²   Physical Exam  Vitals reviewed.   Constitutional:       General: He is not in acute distress.     Appearance: He is well-developed.   HENT:      Head: Normocephalic and atraumatic.   Eyes:      Conjunctiva/sclera: Conjunctivae normal.   Cardiovascular:      Rate and Rhythm: Normal rate and regular rhythm.      Heart  sounds: Normal heart sounds.   Pulmonary:      Effort: Pulmonary effort is normal.      Breath sounds: Normal breath sounds.   Musculoskeletal:      Cervical back: Neck supple.      Right lower leg: No edema.      Left lower leg: No edema.   Skin:     General: Skin is warm and dry.   Neurological:      General: No focal deficit present.      Mental Status: He is alert.   Psychiatric:         Mood and Affect: Mood normal.         No visits with results within 1 Month(s) from this visit.   Latest known visit with results is:   [facility] Lab Encounter on 04/04/2025   Component Date Value    Occult Blood 04/04/2025 Negative        Assessment and Plan:  Baudilio Mosqueda is a 59 year old adult here for   Chief Complaint   Patient presents with    Pre-Op Exam     Ultrasound biopsy in the liver scheduled for 7/9.      1. Preoperative cardiovascular examination    2. Essential hypertension    3. Post-viral cough syndrome    -Recommend starting fluticasone nasal steroid as postviral cough related to postnasal drip. He notes the cough is much improved compared to previous. Would plan to check X-ray if symptoms do not continue to improve/resolve. Normal physical exam.     My recommendations, and advice are as follows:  Cardiac Risk Assessment (Elevated Cardiac risk greater than 1% chance of cardiac death or MI by RCRI is defined as having more than 1 of the following: High risk vascular surgery, CAD, h/o stroke, Insulin Dependant DM, CHF, CKD Cr>2) : no high risk cardiac conditions     Functional Status of Patient and Number of METs (<4 is an ADL like toileting or >4 is housework, climbing stairs, or being able to run and play a sport): >4 METs     Therefore per the American College of Cardiology Patient can proceed to surgery without further testing based on the above.       Substance Abuse and Smoking Risk see the social history above Patient is aware current smoking increases risk of periop and postop complications.  Alcohol  and drug abuse can precipitate withdrawal after surgery or complications during surgery.    Personal or Family History of Surgical Complication : none    Medication Assessment :   Avoid NSAIDs and aspirin for 7 days prior to procedure  Avoid vitamins and supplements for 14 days prior to procedure    Tylenol/acetaminophen is ok    Do not take losartan the night before or morning of procedure    Recommend to patient to ask Dr. Olivier/lucrecia if steroids are needed for the day of US guided liver biopsy procedure. He notes he will contact her.     Patient/Caregiver Education: Patient/Caregiver Education: There are no barriers to learning. Medical education done. Outcome: Patient verbalizes understanding.      Requested Prescriptions     Signed Prescriptions Disp Refills    fluticasone propionate 50 MCG/ACT Nasal Suspension 16 g 0     Si sprays by Each Nare route daily.     Networked reference to record EAP         [1]   Past Medical History:   Adrenal insufficiency (Earnest's disease) (HCC)    Appendicitis    Asthma (HCC)    Bladder tumor    Cancer of lateral wall of urinary bladder (HCC)    Endometrial mass    Endometriosis    Female-to-male transgender person    Born female. Transgender to male with testosterone therapy.     High blood pressure    High cholesterol    Hx of motion sickness    Personal history of antineoplastic chemotherapy    one time dose with surgery    Pre-diabetes    Visual impairment    glasses   [2]   Past Surgical History:  Procedure Laterality Date    Cholecystectomy      [place], [place]    Cystourethroscopy  2021    Cystoscopy Dr. Griffiths    Cystourethroscopy,fulgur 2-5cm lesn  01/10/2022    TURBT with 2g Gemcitabine instill post op    Hysterectomy      Oophorectomy Bilateral 2022    BSO at time of hyst    Total abdominal hysterectomy  2022    GEN/BSO, lysis of adhesions for endometrial mass. Dr. Shyla Craft, [facility]   [3]   Social History  Socioeconomic History    Marital  status:    Tobacco Use    Smoking status: Former     Types: Cigarettes     Start date: 1/24/2019    Smokeless tobacco: Never    Tobacco comments:     hx of 1/4 PPD   Vaping Use    Vaping status: Never Used   Substance and Sexual Activity    Alcohol use: Not Currently     Comment: holidays only    Drug use: No    Sexual activity: Not Currently     Partners: Female   Other Topics Concern    Caffeine Concern Yes     Comment: 1 cup of coffee daily    Exercise Yes     Comment: 4x/week    Seat Belt Yes     Social Drivers of Health     Food Insecurity: No Food Insecurity (3/21/2025)    NCSS - Food Insecurity     Worried About Running Out of Food in the Last Year: No     Ran Out of Food in the Last Year: No   Transportation Needs: No Transportation Needs (3/21/2025)    NCSS - Transportation     Lack of Transportation: No   Housing Stability: Not At Risk (3/21/2025)    NCSS - Housing/Utilities     Has Housing: Yes     Worried About Losing Housing: No     Unable to Get Utilities: No   [4]   Family History  Problem Relation Age of Onset    Stroke Father 61    Stroke Paternal Grandfather     Other (Other) Mother         CKD    Pancreatic Cancer Paternal Grandmother    [5] No Known Allergies  [6]   Current Outpatient Medications:     Rosuvastatin Calcium Does not apply Powder, Take 20 mg by mouth As Directed., Disp: , Rfl:     fluticasone propionate 50 MCG/ACT Nasal Suspension, 2 sprays by Each Nare route daily., Disp: 16 g, Rfl: 0    Syringe/Needle, Disp, (BD LUER-AMBROCIO SYRINGE) 22G X 1\" 3 ML Does not apply Misc, USE 1 SYRINGE TO INJECT TESTOSTERONE UNDER THE SKIN AS DIRECTED EVERY 14 DAYS, Disp: 50 each, Rfl: 1    losartan 25 MG Oral Tab, Take 1 tablet (25 mg total) by mouth daily., Disp: 90 tablet, Rfl: 3    spironolactone 100 MG Oral Tab, Take 1 tablet (100 mg total) by mouth daily., Disp: 90 tablet, Rfl: 3    testosterone cypionate 200 mg/mL Intramuscular Solution, Inject 1.5 mL (300 mg total) into the muscle every 14  (fourteen) days., Disp: 10 mL, Rfl: 1    ibuprofen 600 MG Oral Tab, Take 1 tablet (600 mg total) by mouth every 8 (eight) hours as needed for Pain., Disp: 90 tablet, Rfl: 3    rosuvastatin 20 MG Oral Tab, Take 1 tablet (20 mg total) by mouth every morning., Disp: 90 tablet, Rfl: 3    Syringe 25G X 1\" 3 ML Does not apply Misc, INject every 2 weeks, Disp: 10 each, Rfl: 1    Syringe/Needle, Disp, 22G X 1\" 3 ML Does not apply Misc, Use to inject testosterone every 14 days., Disp: 100 each, Rfl: 0    docusate sodium 100 MG Oral Cap, Take 1 capsule (100 mg total) by mouth 2 (two) times daily., Disp: 90 capsule, Rfl: 1    Omeprazole 40 MG Oral Capsule Delayed Release, Take 1 capsule (40 mg total) by mouth daily., Disp: 90 capsule, Rfl: 3    Budesonide-Formoterol Fumarate (SYMBICORT) 160-4.5 MCG/ACT Inhalation Aerosol, Inhale 2 puffs into the lungs 2 (two) times daily., Disp: 3 each, Rfl: 3    benzonatate 200 MG Oral Cap, Take 1 capsule (200 mg total) by mouth 3 (three) times daily as needed for cough., Disp: 30 capsule, Rfl: 3    oxybutynin ER 5 MG Oral Tablet 24 Hr, Take 1 tablet (5 mg total) by mouth daily., Disp: , Rfl:     acetaminophen 500 MG Oral Tab, Take 1 tablet (500 mg total) by mouth every 6 (six) hours as needed for Pain. (Patient not taking: Reported on 6/27/2025), Disp: , Rfl:     HYDROcodone-acetaminophen 5-325 MG Oral Tab, Take 1 tablet by mouth every 6 (six) hours as needed for Pain. (Patient not taking: Reported on 6/27/2025), Disp: , Rfl:

## 2025-07-04 ENCOUNTER — PATIENT MESSAGE (OUTPATIENT)
Dept: ENDOCRINOLOGY CLINIC | Facility: CLINIC | Age: 60
End: 2025-07-04

## 2025-07-04 DIAGNOSIS — E27.40 ADRENAL INSUFFICIENCY (HCC): Primary | ICD-10-CM

## 2025-07-07 RX ORDER — HYDROCORTISONE 10 MG/1
TABLET ORAL
Qty: 3 TABLET | Refills: 0 | Status: SHIPPED | OUTPATIENT
Start: 2025-07-07 | End: 2025-07-09

## 2025-07-07 NOTE — TELEPHONE ENCOUNTER
He doesn't need any IV hydrocortisone.  Lets just prescribe him oral tablets.  Take Hydrocortisone 20mg in AM Prior to procedure and 10mg in afternoon post procedure.  No need for steroid the day after procedure. Thanks.

## 2025-07-07 NOTE — TELEPHONE ENCOUNTER
Please see patient message.   Per chart review:     ASSESSMENT/PLAN:     1. Adrenal Disease  - Unclear history of possible adrenal insufficiency  - He did require steroid therapy for surgery but never started on long term outpatient steroids  - Normal stimulation test 5/2019  - He does need steroid therapy for surgery  - However no maintenance hydrocortisone   - Recheck Cortisol     Not currently prescribed HC. Would you like to prescribe for upcoming procedure?

## 2025-07-09 ENCOUNTER — HOSPITAL ENCOUNTER (OUTPATIENT)
Dept: ULTRASOUND IMAGING | Facility: HOSPITAL | Age: 60
Discharge: HOME | End: 2025-07-09
Attending: INTERNAL MEDICINE
Payer: COMMERCIAL

## 2025-07-09 ENCOUNTER — NURSE ONLY (OUTPATIENT)
Dept: LAB | Facility: HOSPITAL | Age: 60
End: 2025-07-09
Attending: INTERNAL MEDICINE
Payer: COMMERCIAL

## 2025-07-09 VITALS
HEIGHT: 63 IN | TEMPERATURE: 97 F | DIASTOLIC BLOOD PRESSURE: 69 MMHG | HEART RATE: 65 BPM | BODY MASS INDEX: 30.12 KG/M2 | OXYGEN SATURATION: 97 % | RESPIRATION RATE: 18 BRPM | WEIGHT: 170 LBS | SYSTOLIC BLOOD PRESSURE: 113 MMHG

## 2025-07-09 DIAGNOSIS — K75.81 METABOLIC DYSFUNCTION-ASSOCIATED STEATOHEPATITIS (MASH): ICD-10-CM

## 2025-07-09 DIAGNOSIS — K74.02 ADVANCED HEPATIC FIBROSIS: ICD-10-CM

## 2025-07-09 DIAGNOSIS — K74.02 ADVANCED HEPATIC FIBROSIS: Primary | ICD-10-CM

## 2025-07-09 LAB
ERYTHROCYTE [DISTWIDTH] IN BLOOD BY AUTOMATED COUNT: 12.5 %
HCT VFR BLD AUTO: 48.1 %
HGB BLD-MCNC: 17.2 G/DL (ref 7–20)
INR BLD: 1.04 (ref 0.8–1.2)
MCH RBC QN AUTO: 30.3 PG (ref 26–34)
MCHC RBC AUTO-ENTMCNC: 35.8 G/DL (ref 31–37)
MCV RBC AUTO: 84.8 FL
PLATELET # BLD AUTO: 247 10(3)UL (ref 150–450)
PROTHROMBIN TIME: 13.8 SECONDS (ref 11.6–14.8)
RBC # BLD AUTO: 5.67 X10(6)UL
WBC # BLD AUTO: 10.5 X10(3) UL (ref 4–11)

## 2025-07-09 PROCEDURE — 76942 ECHO GUIDE FOR BIOPSY: CPT | Performed by: INTERNAL MEDICINE

## 2025-07-09 PROCEDURE — 47000 NEEDLE BIOPSY OF LIVER PERQ: CPT | Performed by: INTERNAL MEDICINE

## 2025-07-09 PROCEDURE — 88313 SPECIAL STAINS GROUP 2: CPT | Performed by: INTERNAL MEDICINE

## 2025-07-09 PROCEDURE — 88307 TISSUE EXAM BY PATHOLOGIST: CPT | Performed by: INTERNAL MEDICINE

## 2025-07-09 PROCEDURE — 85027 COMPLETE CBC AUTOMATED: CPT

## 2025-07-09 PROCEDURE — 85610 PROTHROMBIN TIME: CPT

## 2025-07-09 PROCEDURE — 36415 COLL VENOUS BLD VENIPUNCTURE: CPT

## 2025-07-09 PROCEDURE — 99152 MOD SED SAME PHYS/QHP 5/>YRS: CPT | Performed by: INTERNAL MEDICINE

## 2025-07-09 RX ORDER — MIDAZOLAM HYDROCHLORIDE 1 MG/ML
INJECTION INTRAMUSCULAR; INTRAVENOUS
Status: COMPLETED
Start: 2025-07-09 | End: 2025-07-09

## 2025-07-09 RX ORDER — SODIUM CHLORIDE 9 MG/ML
INJECTION, SOLUTION INTRAVENOUS CONTINUOUS
Status: DISCONTINUED | OUTPATIENT
Start: 2025-07-09 | End: 2025-07-11

## 2025-07-09 RX ORDER — MIDAZOLAM HYDROCHLORIDE 1 MG/ML
1 INJECTION INTRAMUSCULAR; INTRAVENOUS EVERY 5 MIN PRN
Status: ACTIVE | OUTPATIENT
Start: 2025-07-09 | End: 2025-07-09

## 2025-07-09 RX ORDER — NALOXONE HYDROCHLORIDE 0.4 MG/ML
0.08 INJECTION, SOLUTION INTRAMUSCULAR; INTRAVENOUS; SUBCUTANEOUS AS NEEDED
Status: DISCONTINUED | OUTPATIENT
Start: 2025-07-09 | End: 2025-07-11

## 2025-07-09 RX ORDER — FLUMAZENIL 0.1 MG/ML
0.2 INJECTION INTRAVENOUS AS NEEDED
Status: DISCONTINUED | OUTPATIENT
Start: 2025-07-09 | End: 2025-07-11

## 2025-07-09 RX ADMIN — MIDAZOLAM HYDROCHLORIDE 1 MG: 1 INJECTION INTRAMUSCULAR; INTRAVENOUS at 11:00:00

## 2025-07-09 RX ADMIN — MIDAZOLAM HYDROCHLORIDE 1 MG: 1 INJECTION INTRAMUSCULAR; INTRAVENOUS at 11:05:00

## 2025-07-09 RX ADMIN — SODIUM CHLORIDE: 9 INJECTION, SOLUTION INTRAVENOUS at 10:55:00

## 2025-07-09 NOTE — H&P
[facility]   part of [facility]   History & Physical    Baudilio Mosqueda Patient Status:  Outpatient    10/3/1965 MRN [id number]   Location [facility] ULTRASOUND Attending Raimundo Howard MD   Hosp Day # 0 PCP Ellie Pandey MD     Admitting Diagnosis:   Fatty liver    History of Present Illness:   60 yo M w/ fatty liver presents for liver biopsy.  ROS neg.    History   Past Medical History:  Past Medical History[1]    Past Surgical History:  Past Surgical History[2]    Social History:  Social History     Tobacco Use    Smoking status: Former     Types: Cigarettes     Start date: 2019    Smokeless tobacco: Never    Tobacco comments:     hx of  PPD   Substance Use Topics    Alcohol use: Not Currently     Comment: holidays only        Family History:  Family History[3]    Allergies/Medications:   Allergies:  Allergies[4]    Medications:  Medications - Current[5]    Physical Exam & Review of Systems:   Physical Exam:    /64 (BP Location: Left arm)   Pulse 70   Temp 97.2 °F (36.2 °C) (Temporal)   Resp 13   Ht 63\"   Wt 170 lb   LMP  (LMP Unknown)   SpO2 96%   BMI 30.11 kg/m²     General: NAD  Neck: No JVD  Lungs: CTA bilat  Heart: RRR, S1, S2  Abdomen: Soft, NT/ND, BS+x4      Results:   Labs:  Recent Labs   Lab 25  0939   RBC 5.67   HGB 17.2   HCT 48.1   MCV 84.8   MCH 30.3   MCHC 35.8   RDW 12.5   WBC 10.5   .0     Recent Labs   Lab 25  0939   PTP 13.8   INR 1.04     No results for input(s): \"GLU\", \"BUN\", \"CREATSERUM\", \"GFRAA\", \"GFRNAA\", \"CA\", \"NA\", \"K\", \"CL\", \"CO2\" in the last 168 hours.    Assessment/Plan:   Impression: 60 yo M w/ fatty liver    I have discussed with the patient and/or legal representative the potential benefits, risks, and side effects of this procedure, the likelihood of the patient achieving goals; and the potential problems that might occur during recuperation.  I discussed reasonable alternatives to the procedure, including risks,  benefits and side effects related to the alternatives, and risks related to not receiving this procedure.      Recommendations:  US guided core biopsy liver    Danilo Ding MD  7/9/2025  11:07 AM           [1]   Past Medical History:   Adrenal insufficiency (Carlisle's disease) (HCC)    Anesthesia complication    per patient \"heart stops when an incision is made\"    Appendicitis    Asthma (HCC)    Bladder tumor    Cancer of lateral wall of urinary bladder (HCC)    Endometrial mass    Endometriosis    Female-to-male transgender person    Born female. Transgender to male with testosterone therapy.     High blood pressure    High cholesterol    Hx of motion sickness    Personal history of antineoplastic chemotherapy    one time dose with surgery    Pre-diabetes    Visual impairment    glasses   [2]   Past Surgical History:  Procedure Laterality Date    Cholecystectomy  2008    [place], [place]    Cystourethroscopy  11/24/2021    Cystoscopy Dr. Griffiths    Cystourethroscopy,fulgur 2-5cm lesn  01/10/2022    TURBT with 2g Gemcitabine instill post op    Hysterectomy      2022    Oophorectomy Bilateral 01/20/2022    BSO at time of hyst    Total abdominal hysterectomy  01/20/2022    GEN/BSO, lysis of adhesions for endometrial mass. Dr. Shyla Craft, [facility]   [3]   Family History  Problem Relation Age of Onset    Stroke Father 61    Stroke Paternal Grandfather     Other (Other) Mother         CKD    Pancreatic Cancer Paternal Grandmother    [4] No Known Allergies  [5]   Current Outpatient Medications:     fluticasone propionate 50 MCG/ACT Nasal Suspension, 2 sprays by Each Nare route daily., Disp: 16 g, Rfl: 0    losartan 25 MG Oral Tab, Take 1 tablet (25 mg total) by mouth daily., Disp: 90 tablet, Rfl: 3    spironolactone 100 MG Oral Tab, Take 1 tablet (100 mg total) by mouth daily., Disp: 90 tablet, Rfl: 3    testosterone cypionate 200 mg/mL Intramuscular Solution, Inject 1.5 mL (300 mg total) into the muscle every 14 (fourteen)  days., Disp: 10 mL, Rfl: 1    ibuprofen 600 MG Oral Tab, Take 1 tablet (600 mg total) by mouth every 8 (eight) hours as needed for Pain., Disp: 90 tablet, Rfl: 3    rosuvastatin 20 MG Oral Tab, Take 1 tablet (20 mg total) by mouth every morning., Disp: 90 tablet, Rfl: 3    acetaminophen 500 MG Oral Tab, Take 1 tablet (500 mg total) by mouth every 6 (six) hours as needed for Pain., Disp: , Rfl:     docusate sodium 100 MG Oral Cap, Take 1 capsule (100 mg total) by mouth 2 (two) times daily., Disp: 90 capsule, Rfl: 1    Omeprazole 40 MG Oral Capsule Delayed Release, Take 1 capsule (40 mg total) by mouth daily., Disp: 90 capsule, Rfl: 3    Budesonide-Formoterol Fumarate (SYMBICORT) 160-4.5 MCG/ACT Inhalation Aerosol, Inhale 2 puffs into the lungs 2 (two) times daily., Disp: 3 each, Rfl: 3    oxybutynin ER 5 MG Oral Tablet 24 Hr, Take 1 tablet (5 mg total) by mouth in the morning., Disp: , Rfl:     hydrocortisone 10 MG Oral Tab, Take 2 tablets (20 mg total) by mouth daily for 1 day, THEN 1 tablet (10 mg total) daily for 1 day. Take 20mg in morning Prior to procedure and 10mg in afternoon post-procedure., Disp: 3 tablet, Rfl: 0    Syringe/Needle, Disp, (BD LUER-AMBROCIO SYRINGE) 22G X 1\" 3 ML Does not apply Misc, USE 1 SYRINGE TO INJECT TESTOSTERONE UNDER THE SKIN AS DIRECTED EVERY 14 DAYS, Disp: 50 each, Rfl: 1    Syringe 25G X 1\" 3 ML Does not apply Misc, INject every 2 weeks, Disp: 10 each, Rfl: 1    Syringe/Needle, Disp, 22G X 1\" 3 ML Does not apply Misc, Use to inject testosterone every 14 days., Disp: 100 each, Rfl: 0    HYDROcodone-acetaminophen 5-325 MG Oral Tab, Take 1 tablet by mouth every 6 (six) hours as needed for Pain. (Patient not taking: Reported on 6/27/2025), Disp: , Rfl:     benzonatate 200 MG Oral Cap, Take 1 capsule (200 mg total) by mouth 3 (three) times daily as needed for cough. (Patient not taking: Reported on 6/30/2025), Disp: 30 capsule, Rfl: 3

## 2025-07-09 NOTE — PROCEDURES
[facility]   part of [facility]  Procedure Note    Baudilio Mosqueda Patient Status:  Outpatient    10/3/1965 MRN [id number]   Location [facility] ULTRASOUND Attending Raimundo Howard MD    Day # 0 PCP Ellie Pandey MD     Procedure: US guided core biopsy liver    Pre-Procedure Diagnosis:  Fatty liver    Post-Procedure Diagnosis: Same    Anesthesia:  Local and Sedation    Findings:  2 x 18g core of R hepatic lobe    Specimens: As above    Blood Loss:  Minimal    Tourniquet Time: None  Complications:  None  Drains:  None    Secondary Diagnosis:  None    Danilo Ding MD  2025

## 2025-07-09 NOTE — DISCHARGE INSTRUCTIONS
Discharge/After Visit [facility] - Department of Radiology  Biopsy - Liver    Post Sedation  Follow these guidelines:  You should be watched overnight by a responsible adult. This person should make sure your condition remains stable.   Do not drink any alcohol for 24 hours after the procedure.  Don’t drive, operate dangerous machinery, make important business or personal decisions, or sign legal documents for 24 hours after the procedure.  Note: Your healthcare provider may tell you not to take any medicine by mouth for pain or sleep for a period of time. These medicines may react with the medicine you were given during your procedure. This could cause a much stronger response than usual.     Activity:  Take it easy for the rest of the day after your biopsy.   You may be sore at the site of the biopsy for the next 5 days.   Do not do any strenuous exercises or lift over five pounds for the next 24 hours.     Biopsy Site:  Keep a bandage on the biopsy site for 24 hours after the procedure.   You may shower after 24 hours, but no soaking in a bathtub for 48 hours.     Lung Biopsy: If chest pain or shortness of breath occurs, go to the nearest Emergency Room.   Liver Biopsy: If there is any increase in right-sided back, shoulder, or abdominal pain, go to the nearest Emergency Room. Call your doctor for unusual dizziness or weakness.   Renal Biopsy: Report any bloody urine, bleeding at the site, swelling, or pain to your ordering provider,      Diet: Drink plenty of fluids and resume your usual home diet. A slow return to normal foods minimizes nausea.    Pain Management:   You may use over-the-counter or prescribed pain relief medication if it is not contraindicated by another condition.     Medications:  You may resume your usual home medications. You may resume blood thinners 24 hours after the procedure if there is no bleeding from/hematoma at the puncture site or bloody urine (Renal Biopsy only)      When to seek medical advice:  Call the provider that ordered the biopsy with any questions and to discuss test results. These may also be available in your [place] My Chart. You may also contact the Radiology Nurse at [phone], M-F, 8-5 with any additional questions or concerns.  Dial [phone] and ask the  to page the on-call IR Radiologist if any of these occur:    A change in color or temperature of the area where the biopsy was performed.  You develop increasing pain or shortness of breath.  Unusual drowsiness, weakness, or dizziness.  Unusual vomiting.     IF YOU FEEL YOU ARE EXPERIENCING AN EMERGENCY,   CALL 911 OR GO TO THE NEAREST EMERGENCY ROOM      4.2.24 MO/  Radiology

## 2025-07-09 NOTE — IMAGING NOTE
1040- Pt arrives ambulatory with spouse Gerri.  NPO since 2300 on 7/8/25.  Denies blood thinners.  Labs drawn today and results reviewed.  Pt's family member in family waiting room, to drive pt home after procedure.  Consent form signed by pt, time out completed prior to procedure.    1115-  liver biopsy complete.  Pt awake but drowsy, vital signs stable.  Pt denies pain.  Biopsy site dressing CDI.  Report given to [facility] RN at bedside, pt transferred to room 2247.

## 2025-08-28 RX ORDER — IBUPROFEN 600 MG/1
600 TABLET, FILM COATED ORAL EVERY 8 HOURS PRN
Qty: 90 TABLET | Refills: 0 | Status: SHIPPED | OUTPATIENT
Start: 2025-08-28

## (undated) DIAGNOSIS — Z78.9 TRANSGENDER: Primary | ICD-10-CM

## (undated) DIAGNOSIS — Z78.9 FEMALE-TO-MALE TRANSGENDER PERSON: Primary | ICD-10-CM

## (undated) DEVICE — SYRINGE 60ML SLIP TIP

## (undated) DEVICE — CATH SECURING DEVICE STATLOCK

## (undated) DEVICE — 3M™ STERI-STRIP™ REINFORCED ADHESIVE SKIN CLOSURES, R1548, 1 IN X 5 IN (25 MM X 125 MM), 4 STRIPS/ENVELOPE: Brand: 3M™ STERI-STRIP™

## (undated) DEVICE — SUTURE VICRYL 2-0 SH

## (undated) DEVICE — PSI-TEC TUBING: Brand: PSI-TEC TUBING

## (undated) DEVICE — SUTURE VICRYL 2-0 CT-1

## (undated) DEVICE — #11 STERILE BLADE: Brand: POLYMER COATED BLADES

## (undated) DEVICE — SUTURE MONOCRYL 4-0 PS-2

## (undated) DEVICE — SOL H2O 1000ML BTL

## (undated) DEVICE — SUTURE VICRYL 3-0 SH

## (undated) DEVICE — VIOLET BRAIDED (POLYGLACTIN 910), SYNTHETIC ABSORBABLE SUTURE: Brand: COATED VICRYL

## (undated) DEVICE — CG INFILTRATION TUBING: Brand: CG INFILTRATION TUBING

## (undated) DEVICE — SUTURE SILK 2-0 SH

## (undated) DEVICE — SUTURE ETHILON 3-0 PS-1

## (undated) DEVICE — 3M™ STERI-STRIP™ REINFORCED ADHESIVE SKIN CLOSURES, R1547, 1/2 IN X 4 IN (12 MM X 100 MM), 6 STRIPS/ENVELOPE: Brand: 3M™ STERI-STRIP™

## (undated) DEVICE — SCD SLEEVE KNEE HI BLEND

## (undated) DEVICE — PAD SANITARY 10" STERILE

## (undated) DEVICE — SOLUTION  .9 1000ML BTL

## (undated) DEVICE — COTTON BALLS LG STERILE

## (undated) DEVICE — SYRINGE 30ML LL TIP

## (undated) DEVICE — SLEEVE KENDALL SCD EXPRESS MED

## (undated) DEVICE — SUTURE PLAIN GUT 2-0 CT

## (undated) DEVICE — SUTURE SILK 3-0 SH

## (undated) DEVICE — LAPAROTOMY CDS: Brand: MEDLINE INDUSTRIES, INC.

## (undated) DEVICE — PLASTIC BREAST CDS-LF: Brand: MEDLINE INDUSTRIES, INC.

## (undated) DEVICE — DRESSING FOAM TOPIFOAM

## (undated) DEVICE — STERILE SYNTHETIC POLYISOPRENE POWDER-FREE SURGICAL GLOVES WITH HYDROGEL COATING: Brand: PROTEXIS

## (undated) DEVICE — CANISTER SHELL LIPOSUCTION

## (undated) DEVICE — ABDOMINAL PAD: Brand: DERMACEA

## (undated) DEVICE — MEGADYNE ELECTRODE ADULT PT RT

## (undated) DEVICE — EVACUATOR URO RELIVAC 100CC

## (undated) DEVICE — SYRINGE,TOOMEY,IRRIGATION,70CC,STERILE: Brand: MEDLINE

## (undated) DEVICE — STERILE POLYISOPRENE POWDER-FREE SURGICAL GLOVES: Brand: PROTEXIS

## (undated) DEVICE — CLOSURE EXOFIN 1.0ML

## (undated) DEVICE — PEN SKIN MARKING REG TIP VIOLT

## (undated) DEVICE — DRESSING BIOPATCH 1X4 BLUE

## (undated) DEVICE — SPONGE RAYTEC 4X4 RF DETECT

## (undated) DEVICE — 3M(TM) TEGADERM(TM) TRANSPARENT FILM DRESSING FRAME STYLE 9505W: Brand: 3M™ TEGADERM™

## (undated) DEVICE — STERILE HOOK LOCK LATEX FREE ELASTIC BANDAGE 6INX5YD: Brand: HOOK LOCK™

## (undated) DEVICE — SUTURE CHROMIC GUT 5-0 RB-1

## (undated) DEVICE — CSTM UNIVERSAL DRAPE PK: Brand: MEDLINE INDUSTRIES, INC.

## (undated) DEVICE — SPONGE STICK WITH PVP-I: Brand: KENDALL

## (undated) DEVICE — SUPER SPONGES,MEDIUM: Brand: KERLIX

## (undated) DEVICE — DRAIN ROUND HUBLESS 15FR

## (undated) DEVICE — HF-RESECTION ELECTRODE PLASMALOOP LOOP, MEDIUM, 24 FR., 12°/16°, ESG TURIS: Brand: OLYMPUS

## (undated) DEVICE — FLOSEAL HEMOSTATIC MATRIX, 5ML: Brand: FLOSEAL HEMOSTATIC MATRIX

## (undated) DEVICE — PANTS KNIT WASHABLE 2/3XL

## (undated) DEVICE — LIGHT HANDLE

## (undated) DEVICE — 3M™ IOBAN™ 2 ANTIMICROBIAL INCISE DRAPE 6648EZ: Brand: IOBAN™ 2

## (undated) DEVICE — MARKER SURGICAL DUAL TIP

## (undated) DEVICE — CYSTO CDS-LF: Brand: MEDLINE INDUSTRIES, INC.

## (undated) DEVICE — BANDAGE ROLL,100% COTTON, 6 PLY, LARGE: Brand: KERLIX

## (undated) DEVICE — PROXIMATE SKIN STAPLERS (35 WIDE) CONTAINS 35 STAINLESS STEEL STAPLES (FIXED HEAD): Brand: PROXIMATE

## (undated) DEVICE — SUTURE VICRYL 0 CT-1

## (undated) DEVICE — GOWN,PREVENTION PLUS,XLARGE,STERILE: Brand: MEDLINE

## (undated) DEVICE — 1010 S-DRAPE TOWEL DRAPE 10/BX: Brand: STERI-DRAPE™

## (undated) DEVICE — LAPAROTOMY SPONGE - RF AND X-RAY DETECTABLE PRE-WASHED: Brand: SITUATE

## (undated) DEVICE — CHEMOTHERAPY CONTAINER,SLIDE LID, YELLOW: Brand: SHARPSAFETY

## (undated) DEVICE — Device

## (undated) DEVICE — HEMOCLIP HORIZON MED 002200

## (undated) DEVICE — SOL  .9 1000ML BTL

## (undated) DEVICE — SUTURE VICRYL 0

## (undated) DEVICE — 3M(TM) MICROPORE TAPE DISPENSER 1535-2: Brand: 3M™ MICROPORE™

## (undated) DEVICE — LAMINECTOMY ARM CRADLE FOAM POSITIONER: Brand: CARDINAL HEALTH

## (undated) DEVICE — BAG DRAIN INFECTION CNTRL 2000

## (undated) DEVICE — SOL  .9 3000ML

## (undated) DEVICE — MEGADYNE E-Z CLEAN BLADE 2.75"

## (undated) DEVICE — #10 STERILE BLADE: Brand: POLYMER COATED BLADES

## (undated) DEVICE — 40580 - THE PINK PAD - ADVANCED TRENDELENBURG POSITIONING KIT: Brand: 40580 - THE PINK PAD - ADVANCED TRENDELENBURG POSITIONING KIT

## (undated) DEVICE — SOLUTION SURG DURAPREP 26ML

## (undated) DEVICE — REM POLYHESIVE ADULT PATIENT RETURN ELECTRODE: Brand: VALLEYLAB

## (undated) DEVICE — ADHESIVE MASTISOL 2/3CC VL

## (undated) NOTE — LETTER
2023              [address] [place] [address]        [place] Hendricks [id number]         Dear Dr. Kang Barr,      It is my recommendation that my patient KENAN Travis [birth year] be given Hydrocortisone 100 mg IV x1 in OR by anesthesia before the start of his surgery. If there are any questions please contact my office.     Sincerely,    Alex Veloz MD  [facility], [address] [place] [address]  [place] [facility], [place] [address] [phone]

## (undated) NOTE — LETTER
22      Madhu Burnett        :  10/3/1965        To Whom It May Concern:    Madhu Burnett  has recently been treated for a medical condition. At this time, I have determined she may return to work effective 3/21/22, without restrictions    If you have any questions, please do not hesitate to contact our office at any time.     Sincerely,        Brooke Clement MD

## (undated) NOTE — LETTER
[facility] Sissy Madrid  [address] [place] [address] Drive [facility] [address]  Sandra Wilson [id number]          6/3/22    Re: Rah Doe   10/3/1965    To Whom It May Concern,      Rah Doe is a patient of mine since 2019. He is a female-to-male transgendered person and has been on testosterone therapy for many years. He underwent mastectomy on 2022. I recommend that he undergo phalloplasty procedure as part of gender confirmation. Please call with any questions. Sincerely,       Wing Bowen.  Kadie Douglas M.D.

## (undated) NOTE — LETTER
Date: 7/9/2025    Patient Name: Baudilio Mosqueda          To Whom it may concern:    This letter has been written at the patient's request. The above patient was seen at [facility] for treatment of a medical condition.    This patient should be excused from all heavy lifting and strenuous activity until 7/14/25.      Sincerely,    Germain Cueto

## (undated) NOTE — LETTER
OUTSIDE TESTING RESULT REQUEST     IMPORTANT: FOR YOUR IMMEDIATE ATTENTION  Please FAX all test results listed below to: [phone]     Testing already done on or about: Appointment with you 2022    * * * * If testing is NOT complete, arrange with patient A.S.A.P. * * * *      Patient Name: Gilda Barnett  Surgery Date: 2022  CSN: [id number]  Medical Record: [id number]   : 10/3/1965 - A: 60 y      Sex: adult  Surgeon(s):  Reggie Maynard MD  Procedure: Bilateral subtotal mastectomy, free nipple grafts, liposuction  Anesthesia Type: General     Surgeon: Reggie Maynard MD     The following Testing and Time Line are REQUIRED PER ANESTHESIA     EKG READ AND SIGNED WITHIN   90 days      Thank You,   Sent by: ZEENAT Barcenas  5/13/15

## (undated) NOTE — LETTER
To: Dr. Pandey  Patient Name: Baudilio Mosqueda  -Age / Sex: 10/3/1965-A: 59 y  adult   Medical Records: [id number] [facility]: [id number]    Request for History & Physical for Radiology Procedure at [facility]    The above patient is scheduled to have a procedure performed in Radiology.  In order for the procedure to be performed safely, a comprehensive History & Physical, to include the Review of Systems, is required within 30 days of the scheduled appointment.      Procedure:  Liver Biopsy  Date Scheduled:  2025 Ordered by (Ordering Physician):  Dr. Howard    Please FAX the completed H&P to [facility] at [phone].  For Questions: Call [facility] [phone]    If you cannot provide us with a comprehensive History & Physical prior to this appointment, you will need to cancel and reschedule the appointment by calling Central Scheduling [phone].  Thank you.

## (undated) NOTE — Clinical Note
Dr. Nanette Nino - He is ok to proceed with surgery from an endocrine perspective. I did start him on Hydrocortisone prior to surgery and will taper post surgery. I would recommend he receive Hydrocortisone 100mg IV x1 in the OR from anesthesia.   Let me know i

## (undated) NOTE — LETTER
09/23/20        [place] [id number]      Dear Trinidad Navarro,    [id number] [facility] records indicate that you have outstanding lab work and or testing that was ordered for you and has not yet been completed:  Orders Placed This [place]

## (undated) NOTE — LETTER
[facility] Bobby Parekh Luis Eduardo Joe  [address] [place] [address]  Denise Juarez [id number]          6/3/22    Re: Coral Duarte   10/3/1965    To Whom It May Concern,      Coral Duarte is a patient of mine since 2019. He is a female-to-male transgendered person and has been on testosterone therapy for many years. He underwent mastectomy on 2022. I recommend that he undergo phalloplasty procedure as part of gender affirmation surgery. Please call with any questions. Sincerely,    Shruti Mark.  Lawrence Glover M.D.